# Patient Record
Sex: FEMALE | Race: WHITE | Employment: OTHER | URBAN - NONMETROPOLITAN AREA
[De-identification: names, ages, dates, MRNs, and addresses within clinical notes are randomized per-mention and may not be internally consistent; named-entity substitution may affect disease eponyms.]

---

## 2017-01-18 RX ORDER — ATORVASTATIN CALCIUM 10 MG/1
TABLET, FILM COATED ORAL
Qty: 90 TABLET | Refills: 3 | Status: SHIPPED | OUTPATIENT
Start: 2017-01-18 | End: 2018-01-30 | Stop reason: SDUPTHER

## 2017-04-06 RX ORDER — LEVOTHYROXINE SODIUM 75 MCG
TABLET ORAL
Qty: 90 TABLET | Refills: 3 | Status: SHIPPED | OUTPATIENT
Start: 2017-04-06 | End: 2018-04-11 | Stop reason: SDUPTHER

## 2017-05-08 ENCOUNTER — OFFICE VISIT (OUTPATIENT)
Dept: FAMILY MEDICINE CLINIC | Age: 77
End: 2017-05-08

## 2017-05-08 VITALS
HEIGHT: 64 IN | BODY MASS INDEX: 26.8 KG/M2 | DIASTOLIC BLOOD PRESSURE: 76 MMHG | HEART RATE: 81 BPM | OXYGEN SATURATION: 96 % | WEIGHT: 157 LBS | SYSTOLIC BLOOD PRESSURE: 128 MMHG

## 2017-05-08 DIAGNOSIS — M51.36 LUMBAR DEGENERATIVE DISC DISEASE: ICD-10-CM

## 2017-05-08 DIAGNOSIS — Z23 NEED FOR PROPHYLACTIC VACCINATION AGAINST DIPHTHERIA-TETANUS-PERTUSSIS (DTP): ICD-10-CM

## 2017-05-08 DIAGNOSIS — E05.90 HYPERTHYROIDISM: ICD-10-CM

## 2017-05-08 DIAGNOSIS — E78.2 MIXED HYPERLIPIDEMIA: ICD-10-CM

## 2017-05-08 DIAGNOSIS — E05.00 GRAVES' OPHTHALMOPATHY: ICD-10-CM

## 2017-05-08 DIAGNOSIS — N32.81 OVERACTIVE BLADDER: Primary | ICD-10-CM

## 2017-05-08 DIAGNOSIS — Z23 NEED FOR PROPHYLACTIC VACCINATION AGAINST STREPTOCOCCUS PNEUMONIAE (PNEUMOCOCCUS): ICD-10-CM

## 2017-05-08 DIAGNOSIS — L40.9 PSORIASIS: ICD-10-CM

## 2017-05-08 DIAGNOSIS — L43.9 LICHEN PLANUS: ICD-10-CM

## 2017-05-08 DIAGNOSIS — L82.0 SEBORRHEIC KERATOSIS, INFLAMED: ICD-10-CM

## 2017-05-08 DIAGNOSIS — E55.9 VITAMIN D DEFICIENCY: ICD-10-CM

## 2017-05-08 PROCEDURE — G8420 CALC BMI NORM PARAMETERS: HCPCS | Performed by: FAMILY MEDICINE

## 2017-05-08 PROCEDURE — 90732 PPSV23 VACC 2 YRS+ SUBQ/IM: CPT | Performed by: FAMILY MEDICINE

## 2017-05-08 PROCEDURE — 1123F ACP DISCUSS/DSCN MKR DOCD: CPT | Performed by: FAMILY MEDICINE

## 2017-05-08 PROCEDURE — 4040F PNEUMOC VAC/ADMIN/RCVD: CPT | Performed by: FAMILY MEDICINE

## 2017-05-08 PROCEDURE — 1036F TOBACCO NON-USER: CPT | Performed by: FAMILY MEDICINE

## 2017-05-08 PROCEDURE — 1090F PRES/ABSN URINE INCON ASSESS: CPT | Performed by: FAMILY MEDICINE

## 2017-05-08 PROCEDURE — 99214 OFFICE O/P EST MOD 30 MIN: CPT | Performed by: FAMILY MEDICINE

## 2017-05-08 PROCEDURE — G8399 PT W/DXA RESULTS DOCUMENT: HCPCS | Performed by: FAMILY MEDICINE

## 2017-05-08 PROCEDURE — G8427 DOCREV CUR MEDS BY ELIG CLIN: HCPCS | Performed by: FAMILY MEDICINE

## 2017-05-08 PROCEDURE — G0009 ADMIN PNEUMOCOCCAL VACCINE: HCPCS | Performed by: FAMILY MEDICINE

## 2017-05-08 RX ORDER — CLOBETASOL PROPIONATE 0.5 MG/G
CREAM TOPICAL
Qty: 60 G | Refills: 0 | Status: SHIPPED | OUTPATIENT
Start: 2017-05-08 | End: 2018-07-10 | Stop reason: ALTCHOICE

## 2017-05-12 ENCOUNTER — NURSE ONLY (OUTPATIENT)
Dept: FAMILY MEDICINE CLINIC | Age: 77
End: 2017-05-12

## 2017-05-12 DIAGNOSIS — E55.9 VITAMIN D DEFICIENCY: ICD-10-CM

## 2017-05-12 DIAGNOSIS — E05.90 HYPERTHYROIDISM: ICD-10-CM

## 2017-05-12 DIAGNOSIS — E78.2 MIXED HYPERLIPIDEMIA: ICD-10-CM

## 2017-05-12 LAB
A/G RATIO: 2 (ref 1.1–2.2)
ALBUMIN SERPL-MCNC: 4.1 G/DL (ref 3.4–5)
ALP BLD-CCNC: 111 U/L (ref 40–129)
ALT SERPL-CCNC: 10 U/L (ref 10–40)
ANION GAP SERPL CALCULATED.3IONS-SCNC: 14 MMOL/L (ref 3–16)
AST SERPL-CCNC: 15 U/L (ref 15–37)
BILIRUB SERPL-MCNC: 0.6 MG/DL (ref 0–1)
BUN BLDV-MCNC: 18 MG/DL (ref 7–20)
CALCIUM SERPL-MCNC: 8.9 MG/DL (ref 8.3–10.6)
CHLORIDE BLD-SCNC: 106 MMOL/L (ref 99–110)
CHOLESTEROL, TOTAL: 150 MG/DL (ref 0–199)
CO2: 25 MMOL/L (ref 21–32)
CREAT SERPL-MCNC: 0.8 MG/DL (ref 0.6–1.2)
GFR AFRICAN AMERICAN: >60
GFR NON-AFRICAN AMERICAN: >60
GLOBULIN: 2.1 G/DL
GLUCOSE BLD-MCNC: 78 MG/DL (ref 70–99)
HDLC SERPL-MCNC: 73 MG/DL (ref 40–60)
LDL CHOLESTEROL CALCULATED: 57 MG/DL
POTASSIUM SERPL-SCNC: 4.4 MMOL/L (ref 3.5–5.1)
SODIUM BLD-SCNC: 145 MMOL/L (ref 136–145)
TOTAL PROTEIN: 6.2 G/DL (ref 6.4–8.2)
TRIGL SERPL-MCNC: 100 MG/DL (ref 0–150)
VLDLC SERPL CALC-MCNC: 20 MG/DL

## 2017-05-12 PROCEDURE — 36415 COLL VENOUS BLD VENIPUNCTURE: CPT | Performed by: FAMILY MEDICINE

## 2017-05-13 LAB
T4 FREE: 1.7 NG/DL (ref 0.9–1.8)
TSH SERPL DL<=0.05 MIU/L-ACNC: 0.46 UIU/ML (ref 0.27–4.2)
VITAMIN D 25-HYDROXY: 54 NG/ML

## 2017-06-14 ENCOUNTER — OFFICE VISIT (OUTPATIENT)
Dept: ORTHOPEDIC SURGERY | Age: 77
End: 2017-06-14

## 2017-06-14 VITALS
SYSTOLIC BLOOD PRESSURE: 144 MMHG | HEIGHT: 64 IN | DIASTOLIC BLOOD PRESSURE: 72 MMHG | BODY MASS INDEX: 24.43 KG/M2 | HEART RATE: 69 BPM | WEIGHT: 143.08 LBS

## 2017-06-14 DIAGNOSIS — S97.102A CRUSH INJURY, TOE, LEFT, INITIAL ENCOUNTER: Primary | ICD-10-CM

## 2017-06-14 DIAGNOSIS — S92.535A CLOSED NONDISPLACED FRACTURE OF DISTAL PHALANX OF LESSER TOE OF LEFT FOOT, INITIAL ENCOUNTER: ICD-10-CM

## 2017-06-14 PROBLEM — S97.109A: Status: ACTIVE | Noted: 2017-06-14

## 2017-06-14 PROCEDURE — G8420 CALC BMI NORM PARAMETERS: HCPCS | Performed by: ORTHOPAEDIC SURGERY

## 2017-06-14 PROCEDURE — G8427 DOCREV CUR MEDS BY ELIG CLIN: HCPCS | Performed by: ORTHOPAEDIC SURGERY

## 2017-06-14 PROCEDURE — 1090F PRES/ABSN URINE INCON ASSESS: CPT | Performed by: ORTHOPAEDIC SURGERY

## 2017-06-14 PROCEDURE — 99202 OFFICE O/P NEW SF 15 MIN: CPT | Performed by: ORTHOPAEDIC SURGERY

## 2017-06-14 PROCEDURE — 1123F ACP DISCUSS/DSCN MKR DOCD: CPT | Performed by: ORTHOPAEDIC SURGERY

## 2017-06-14 PROCEDURE — G8399 PT W/DXA RESULTS DOCUMENT: HCPCS | Performed by: ORTHOPAEDIC SURGERY

## 2017-06-14 PROCEDURE — 1036F TOBACCO NON-USER: CPT | Performed by: ORTHOPAEDIC SURGERY

## 2017-06-14 PROCEDURE — 4040F PNEUMOC VAC/ADMIN/RCVD: CPT | Performed by: ORTHOPAEDIC SURGERY

## 2017-07-10 ENCOUNTER — OFFICE VISIT (OUTPATIENT)
Dept: ORTHOPEDIC SURGERY | Age: 77
End: 2017-07-10

## 2017-07-10 VITALS
SYSTOLIC BLOOD PRESSURE: 141 MMHG | DIASTOLIC BLOOD PRESSURE: 65 MMHG | HEART RATE: 57 BPM | HEIGHT: 64 IN | BODY MASS INDEX: 24.43 KG/M2 | WEIGHT: 143.08 LBS

## 2017-07-10 DIAGNOSIS — S97.102A CRUSH INJURY, TOE, LEFT, INITIAL ENCOUNTER: ICD-10-CM

## 2017-07-10 DIAGNOSIS — M79.672 LEFT FOOT PAIN: Primary | ICD-10-CM

## 2017-07-10 DIAGNOSIS — S92.535A CLOSED NONDISPLACED FRACTURE OF DISTAL PHALANX OF LESSER TOE OF LEFT FOOT, INITIAL ENCOUNTER: ICD-10-CM

## 2017-07-10 PROCEDURE — 4040F PNEUMOC VAC/ADMIN/RCVD: CPT | Performed by: ORTHOPAEDIC SURGERY

## 2017-07-10 PROCEDURE — G8399 PT W/DXA RESULTS DOCUMENT: HCPCS | Performed by: ORTHOPAEDIC SURGERY

## 2017-07-10 PROCEDURE — G8427 DOCREV CUR MEDS BY ELIG CLIN: HCPCS | Performed by: ORTHOPAEDIC SURGERY

## 2017-07-10 PROCEDURE — 73630 X-RAY EXAM OF FOOT: CPT | Performed by: ORTHOPAEDIC SURGERY

## 2017-07-10 PROCEDURE — 99213 OFFICE O/P EST LOW 20 MIN: CPT | Performed by: ORTHOPAEDIC SURGERY

## 2017-07-10 PROCEDURE — 1036F TOBACCO NON-USER: CPT | Performed by: ORTHOPAEDIC SURGERY

## 2017-07-10 PROCEDURE — G8420 CALC BMI NORM PARAMETERS: HCPCS | Performed by: ORTHOPAEDIC SURGERY

## 2017-07-10 PROCEDURE — 1090F PRES/ABSN URINE INCON ASSESS: CPT | Performed by: ORTHOPAEDIC SURGERY

## 2017-07-10 PROCEDURE — 1123F ACP DISCUSS/DSCN MKR DOCD: CPT | Performed by: ORTHOPAEDIC SURGERY

## 2017-11-08 ENCOUNTER — OFFICE VISIT (OUTPATIENT)
Dept: FAMILY MEDICINE CLINIC | Age: 77
End: 2017-11-08

## 2017-11-08 VITALS
BODY MASS INDEX: 26.32 KG/M2 | HEIGHT: 64 IN | DIASTOLIC BLOOD PRESSURE: 74 MMHG | HEART RATE: 65 BPM | OXYGEN SATURATION: 98 % | WEIGHT: 154.2 LBS | SYSTOLIC BLOOD PRESSURE: 122 MMHG

## 2017-11-08 DIAGNOSIS — E89.0 POSTABLATIVE HYPOTHYROIDISM: Primary | ICD-10-CM

## 2017-11-08 DIAGNOSIS — E05.90 HYPERTHYROIDISM: ICD-10-CM

## 2017-11-08 DIAGNOSIS — R53.83 FATIGUE, UNSPECIFIED TYPE: ICD-10-CM

## 2017-11-08 DIAGNOSIS — L43.9 LICHEN PLANUS: ICD-10-CM

## 2017-11-08 DIAGNOSIS — N32.81 OVERACTIVE BLADDER: ICD-10-CM

## 2017-11-08 DIAGNOSIS — E78.2 MIXED HYPERLIPIDEMIA: ICD-10-CM

## 2017-11-08 DIAGNOSIS — M51.36 LUMBAR DEGENERATIVE DISC DISEASE: ICD-10-CM

## 2017-11-08 DIAGNOSIS — L40.9 PSORIASIS: ICD-10-CM

## 2017-11-08 DIAGNOSIS — L21.9 SEBORRHEA: ICD-10-CM

## 2017-11-08 LAB
BASOPHILS ABSOLUTE: 0.1 K/UL (ref 0–0.2)
BASOPHILS RELATIVE PERCENT: 1 %
EOSINOPHILS ABSOLUTE: 0.1 K/UL (ref 0–0.6)
EOSINOPHILS RELATIVE PERCENT: 1.1 %
HCT VFR BLD CALC: 45.6 % (ref 36–48)
HEMOGLOBIN: 15 G/DL (ref 12–16)
LYMPHOCYTES ABSOLUTE: 1.1 K/UL (ref 1–5.1)
LYMPHOCYTES RELATIVE PERCENT: 17.7 %
MCH RBC QN AUTO: 30.2 PG (ref 26–34)
MCHC RBC AUTO-ENTMCNC: 32.8 G/DL (ref 31–36)
MCV RBC AUTO: 92.2 FL (ref 80–100)
MONOCYTES ABSOLUTE: 0.5 K/UL (ref 0–1.3)
MONOCYTES RELATIVE PERCENT: 8.6 %
NEUTROPHILS ABSOLUTE: 4.3 K/UL (ref 1.7–7.7)
NEUTROPHILS RELATIVE PERCENT: 71.6 %
PDW BLD-RTO: 14.1 % (ref 12.4–15.4)
PLATELET # BLD: 235 K/UL (ref 135–450)
PMV BLD AUTO: 10 FL (ref 5–10.5)
RBC # BLD: 4.95 M/UL (ref 4–5.2)
T3 FREE: 2.7 PG/ML (ref 2.3–4.2)
T4 FREE: 1.8 NG/DL (ref 0.9–1.8)
TSH SERPL DL<=0.05 MIU/L-ACNC: 1.74 UIU/ML (ref 0.27–4.2)
WBC # BLD: 6 K/UL (ref 4–11)

## 2017-11-08 PROCEDURE — 4040F PNEUMOC VAC/ADMIN/RCVD: CPT | Performed by: FAMILY MEDICINE

## 2017-11-08 PROCEDURE — G8427 DOCREV CUR MEDS BY ELIG CLIN: HCPCS | Performed by: FAMILY MEDICINE

## 2017-11-08 PROCEDURE — 1090F PRES/ABSN URINE INCON ASSESS: CPT | Performed by: FAMILY MEDICINE

## 2017-11-08 PROCEDURE — 36415 COLL VENOUS BLD VENIPUNCTURE: CPT | Performed by: FAMILY MEDICINE

## 2017-11-08 PROCEDURE — 1036F TOBACCO NON-USER: CPT | Performed by: FAMILY MEDICINE

## 2017-11-08 PROCEDURE — G8417 CALC BMI ABV UP PARAM F/U: HCPCS | Performed by: FAMILY MEDICINE

## 2017-11-08 PROCEDURE — 1123F ACP DISCUSS/DSCN MKR DOCD: CPT | Performed by: FAMILY MEDICINE

## 2017-11-08 PROCEDURE — 99214 OFFICE O/P EST MOD 30 MIN: CPT | Performed by: FAMILY MEDICINE

## 2017-11-08 PROCEDURE — G8482 FLU IMMUNIZE ORDER/ADMIN: HCPCS | Performed by: FAMILY MEDICINE

## 2017-11-08 PROCEDURE — G8399 PT W/DXA RESULTS DOCUMENT: HCPCS | Performed by: FAMILY MEDICINE

## 2017-11-08 RX ORDER — KETOCONAZOLE 20 MG/ML
SHAMPOO TOPICAL
Qty: 120 ML | Refills: 5 | Status: SHIPPED | OUTPATIENT
Start: 2017-11-08 | End: 2019-02-27 | Stop reason: SDUPTHER

## 2017-11-08 RX ORDER — FLUOCINONIDE TOPICAL SOLUTION USP, 0.05% 0.5 MG/ML
SOLUTION TOPICAL
Qty: 60 ML | Refills: 2 | Status: SHIPPED | OUTPATIENT
Start: 2017-11-08 | End: 2018-04-11 | Stop reason: SDUPTHER

## 2017-11-08 RX ORDER — INFLUENZA A VIRUS A/MICHIGAN/45/2015 X-275 (H1N1) ANTIGEN (FORMALDEHYDE INACTIVATED), INFLUENZA A VIRUS A/SINGAPORE/INFIMH-16-0019/2016 IVR-186 (H3N2) ANTIGEN (FORMALDEHYDE INACTIVATED), AND INFLUENZA B VIRUS B/MARYLAND/15/2016 BX-69A (A B/COLORADO/6/2017-LIKE VIRUS) ANTIGEN (FORMALDEHYDE INACTIVATED) 60; 60; 60 UG/.5ML; UG/.5ML; UG/.5ML
INJECTION, SUSPENSION INTRAMUSCULAR
Refills: 0 | COMMUNITY
Start: 2017-10-11 | End: 2017-11-08 | Stop reason: ALTCHOICE

## 2017-11-08 ASSESSMENT — PATIENT HEALTH QUESTIONNAIRE - PHQ9
1. LITTLE INTEREST OR PLEASURE IN DOING THINGS: 0
2. FEELING DOWN, DEPRESSED OR HOPELESS: 0
SUM OF ALL RESPONSES TO PHQ9 QUESTIONS 1 & 2: 0
SUM OF ALL RESPONSES TO PHQ QUESTIONS 1-9: 0

## 2017-11-08 NOTE — PATIENT INSTRUCTIONS
Try taking tylenol in the AM and an Advil at night for hip pain. Patient should call the office immediately with new or ongoing signs or symptoms or worsening, or proceed to the emergency room. If you are on medications which could impair your senses, you are at risk of weakness, falls, dizziness, or drowsiness. You should be careful during activities which could place you at risk of harm, such as climbing, using stairs, operating machinery, or driving vehicles. If you feel you cannot safely do these activities, you should request others to help you, or avoid the activities altogether. If you are drowsy for any other reason, you should use the same precautions as listed above.

## 2017-11-08 NOTE — PROGRESS NOTES
SUBJECTIVE:    2017    Mili Palafox (: 1940)    68 y.o.    female    Prior to Visit Medications    Medication Sig Taking? Authorizing Provider   clobetasol prop emollient base (CLOBETASOL PROPIONATE E) 0.05 % CREA Apply daily Yes Victor M Coello MD   SYNTHROID 75 MCG tablet TAKE 1 TABLET DAILY Yes Mary Ayon NP   atorvastatin (LIPITOR) 10 MG tablet TAKE 1 TABLET DAILY Yes Victor M Coello MD   Cholecalciferol (VITAMIN D3) 5000 UNITS TABS Take 1 each by mouth Yes Historical Provider, MD   Misc Natural Products (OSTEO BI-FLEX JOINT SHIELD PO) Take by mouth + VITAMIN D Yes Historical Provider, MD   ibandronate (BONIVA) 150 MG tablet Take 150 mg by mouth every 30 days Take 1 tablet every 30 days. Yes Historical Provider, MD   tolterodine (DETROL LA) 4 MG ER capsule Take 4 mg by mouth daily. Yes Historical Provider, MD   timolol (BETIMOL) 0.5 % ophthalmic solution 1 drop 2 times daily. Yes Historical Provider, MD       ALLERGIES:  Allergies   Allergen Reactions    Bacitracin        Chief Complaint   Patient presents with    Hyperlipidemia     Watching lowfat diet.  Hypothyroidism     SHe is very tired all the time. She also gets very hot and then gets  She would like Thyroid levels checked again.  Other     Vitamin D Deficiency.  Hair/Scalp Problem     Dermatologist took a look at spot in head and recommended Flucononide 0.0% solution and Ketoconazole 2% shampoo or Selenium Sulfide 2.5 % Shampoo. She was told to ask PCP to order these for her. She states her hips hurt during the night, she has been taking Advil 1 at night, advised she could take tylenol at bedtime and Advil in AM, or the opposite and see how she gets along. She states she is always fatigued. HPI  Mili Palafox dermatologist took a glance at her head and recommended fluocinonide solution and Ketaconozole 2% shampoo or selenium sulfide 2 and half percent shampoo for seborrheic dermatitis. She did this while she was at 's dermatology appointment. She asked that we write the prescriptions go she was not formally seen. We have actually written and diagnosed similar things in the past.  Complains of feeling tired gets hot then gets cold. She's had no falls. She is worried about her  and his health, does not sleep well. Seems that she wakes up several times throughout the night. Is here for cholesterol. Tolerating medication. Trying to watch low-fat diet. Weight stable. No change in exercise. Here for hypothyroidism. No tremor. No palpitations. No hair loss. No change in skin texture. HX: (> 3 status chronic/inact. Prob) or  Wt Readings from Last 3 Encounters:   11/08/17 154 lb 3.2 oz (69.9 kg)   07/10/17 143 lb 1.3 oz (64.9 kg)   06/14/17 143 lb 1.3 oz (64.9 kg)       Occupation Retired from healthcare administration              HPI:  (>4 )  LOCATION:  QUALITY:SEVERITY:  DURATION:  TIMING:  CONTEXT:  MOD. FACTORS:  ASSOC. S/S:    Pertinent items are noted in HPI.  (+/- 2-9 systems)  ROS  All other systems reviewed and are negative except as noted above  Additional review of systems may be scanned into the media section of this medical record. Any responses requiring further intervention were pursued.     Past Medical History:   Diagnosis Date    Closed nondisplaced fracture of distal phalanx of lesser toe of left foot 6/14/2017    DDD (degenerative disc disease)     Grave's disease     Hyperlipidemia     Lichen planus     Overactive bladder     Psoriasis     Radiculopathy        Family History   Problem Relation Age of Onset    Heart Disease Mother     Heart Disease Father        Social History   Substance Use Topics    Smoking status: Former Smoker     Quit date: 7/8/1998    Smokeless tobacco: Never Used    Alcohol use Yes         Past Surgical History:   Procedure Laterality Date    CYST REMOVAL      from right forearm 9/2016    EYE SURGERY      HYSTERECTOMY         Immunization History   Administered Date(s) Administered    Influenza Vaccine, unspecified formulation 10/13/2016    Influenza Virus Vaccine 11/03/2015, 10/11/2017    Pneumococcal 13-valent Conjugate ([id number]) 04/14/2015    Pneumococcal Polysaccharide ([id number]) 05/08/2017    Tdap (Boostrix, Adacel) 05/09/2017       Vitals:    11/08/17 1018   BP: 122/74   Site: Left Arm   Position: Sitting   Cuff Size: Medium Adult   Pulse: 65   SpO2: 98%   Weight: 154 lb 3.2 oz (69.9 kg)   Height: 5' 3.5\" (1.613 m)       Estimated body mass index is 26.89 kg/m² as calculated from the following:    Height as of this encounter: 5' 3.5\" (1.613 m). Weight as of this encounter: 154 lb 3.2 oz (69.9 kg).   OBJECTIVE:    /74 (Site: Left Arm, Position: Sitting, Cuff Size: Medium Adult)   Pulse 65   Ht 5' 3.5\" (1.613 m)   Wt 154 lb 3.2 oz (69.9 kg)   LMP  (LMP Unknown)   SpO2 98%   BMI 26.89 kg/m²   BP Readings from Last 2 Encounters:   11/08/17 122/74   07/10/17 (!) 141/65     Lab Review   Nurse Only on 05/12/2017   Component Date Value    Sodium 05/12/2017 145     Potassium 05/12/2017 4.4     Chloride 05/12/2017 106     CO2 05/12/2017 25     Anion Gap 05/12/2017 14     Glucose 05/12/2017 78     BUN 05/12/2017 18     CREATININE 05/12/2017 0.8     GFR Non- 05/12/2017 >60     GFR  05/12/2017 >60     Calcium 05/12/2017 8.9     Total Protein 05/12/2017 6.2*    Alb 05/12/2017 4.1     Albumin/Globulin Ratio 05/12/2017 2.0     Total Bilirubin 05/12/2017 0.6     Alkaline Phosphatase 05/12/2017 111     ALT 05/12/2017 10     AST 05/12/2017 15     Globulin 05/12/2017 2.1     Cholesterol, Total 05/12/2017 150     Triglycerides 05/12/2017 100     HDL 05/12/2017 73*    LDL Calculated 05/12/2017 57     VLDL CHOLESTEROL CALCULA* 05/12/2017 20     TSH 05/13/2017 0.46     T4 Free 05/13/2017 1.7     Vit D, 25-Hydroxy 05/13/2017 54.0        Physical Exam Constitutional: She is oriented to person, place, and time. She appears well-developed and well-nourished. No distress. HENT:   Head: Normocephalic and atraumatic. Right Ear: External ear normal.   Left Ear: External ear normal.   Nose: Nose normal.   Eyes: Conjunctivae are normal. Pupils are equal, round, and reactive to light. Right eye exhibits no discharge. Left eye exhibits no discharge. No scleral icterus. Left eye exhibits abnormal extraocular motion (Ptosis left eye chronic). Neck: Normal range of motion. Neck supple. No JVD present. No tracheal deviation present. No thyromegaly present. Cardiovascular: Normal rate, regular rhythm, normal heart sounds and intact distal pulses. Exam reveals no gallop and no friction rub. No murmur heard. Pulmonary/Chest: Effort normal and breath sounds normal. No stridor. No respiratory distress. She has no wheezes. She has no rales. She exhibits no tenderness. Abdominal: Soft. Bowel sounds are normal. She exhibits no distension and no mass. There is no tenderness. There is no guarding. Musculoskeletal: She exhibits no edema. Right elbow: She exhibits normal range of motion. Left elbow: She exhibits normal range of motion. Right knee: She exhibits normal range of motion. Left knee: She exhibits normal range of motion. Right ankle: She exhibits normal range of motion. Left ankle: She exhibits normal range of motion. Lumbar back: She exhibits decreased range of motion and tenderness. Lymphadenopathy:        Head (right side): No submental, no submandibular, no tonsillar, no preauricular, no posterior auricular and no occipital adenopathy present. Head (left side): No submental, no submandibular, no tonsillar, no preauricular, no posterior auricular and no occipital adenopathy present. She has no cervical adenopathy. Right: No supraclavicular adenopathy present.         Left: No immediately with new or ongoing signs or symptoms or worsening, or proceed to the emergency room. All entries in chief complaint and history of present illness are reviewed and validated by me. No changes in past medical history, past surgical history, social history, or family history were noted during the patient encounter unless specifically listed above. All updates of past medical history, past surgical history, social history, or family history were reviewed personally by me during the office visit. All problems listed in the assessment are stable unless noted otherwise. Medication profile reviewed personally by me during the office visit. Medication side effects and possible impairments from medications were discussed as applicable. Every effort has been made to assure accurate transcription by this voice recognition software. However, mistakes in transcription may still occur      IZoraida am scribing for and in the presence of Dr Bobbi Mckeon. 11/8/2017      10:36 AM      I, Dr. Ace Szymanski, personally performed the services described in this documentation, as scribed by Zoraida Lozoya RN, in my presence, and it is both accurate and complete. I agree with the Chief Complaint, ROS, and Past Histories independently gathered by the clinical support staff and the remaining scribed note accurately describes my personal service to the patient.     11/8/2017    10:36 AM

## 2017-11-09 LAB
FOLATE: 12.68 NG/ML (ref 4.78–24.2)
VITAMIN B-12: 311 PG/ML (ref 211–911)

## 2018-01-30 ENCOUNTER — OFFICE VISIT (OUTPATIENT)
Dept: FAMILY MEDICINE CLINIC | Age: 78
End: 2018-01-30

## 2018-01-30 VITALS
DIASTOLIC BLOOD PRESSURE: 66 MMHG | OXYGEN SATURATION: 96 % | BODY MASS INDEX: 26.8 KG/M2 | HEART RATE: 60 BPM | WEIGHT: 157 LBS | SYSTOLIC BLOOD PRESSURE: 124 MMHG | HEIGHT: 64 IN

## 2018-01-30 DIAGNOSIS — E78.2 MIXED HYPERLIPIDEMIA: ICD-10-CM

## 2018-01-30 DIAGNOSIS — L82.0 SEBORRHEIC KERATOSES, INFLAMED: Primary | ICD-10-CM

## 2018-01-30 PROCEDURE — 1036F TOBACCO NON-USER: CPT | Performed by: FAMILY MEDICINE

## 2018-01-30 PROCEDURE — 1090F PRES/ABSN URINE INCON ASSESS: CPT | Performed by: FAMILY MEDICINE

## 2018-01-30 PROCEDURE — G8427 DOCREV CUR MEDS BY ELIG CLIN: HCPCS | Performed by: FAMILY MEDICINE

## 2018-01-30 PROCEDURE — 1123F ACP DISCUSS/DSCN MKR DOCD: CPT | Performed by: FAMILY MEDICINE

## 2018-01-30 PROCEDURE — G8417 CALC BMI ABV UP PARAM F/U: HCPCS | Performed by: FAMILY MEDICINE

## 2018-01-30 PROCEDURE — 99212 OFFICE O/P EST SF 10 MIN: CPT | Performed by: FAMILY MEDICINE

## 2018-01-30 PROCEDURE — 4040F PNEUMOC VAC/ADMIN/RCVD: CPT | Performed by: FAMILY MEDICINE

## 2018-01-30 PROCEDURE — G8482 FLU IMMUNIZE ORDER/ADMIN: HCPCS | Performed by: FAMILY MEDICINE

## 2018-01-30 PROCEDURE — G8399 PT W/DXA RESULTS DOCUMENT: HCPCS | Performed by: FAMILY MEDICINE

## 2018-01-30 RX ORDER — ATORVASTATIN CALCIUM 10 MG/1
TABLET, FILM COATED ORAL
Qty: 90 TABLET | Refills: 3 | Status: SHIPPED | OUTPATIENT
Start: 2018-01-30 | End: 2019-01-03 | Stop reason: SDUPTHER

## 2018-01-30 NOTE — PATIENT INSTRUCTIONS
Return on 2/9/18 at 4:00 for lesion removal.     Patient should call the office immediately with new or ongoing signs or symptoms or worsening, or proceed to the emergency room. If you are on medications which could impair your senses, you are at risk of weakness, falls, dizziness, or drowsiness. You should be careful during activities which could place you at risk of harm, such as climbing, using stairs, operating machinery, or driving vehicles. If you feel you cannot safely do these activities, you should request others to help you, or avoid the activities altogether. If you are drowsy for any other reason, you should use the same precautions as listed above.

## 2018-01-30 NOTE — PROGRESS NOTES
Chief Complaint   Patient presents with    Lesion(s)   Lesion on her back has been changing in size and has red ring around it. HPI:   Red Louise is a 68 y.o. (: 1940) here today for Lesion. Skin Lesion  Patient complains of a skin lesion of the back. The lesion has been present for 1 year. Lesion has changed in 1 year. Symptoms associated with the lesion are: increasing diameter, increasing thickness. Patient denies itching, bleeding, tendency to be traumatized, pain, drainage. Patient's medications, allergies, past medical, surgical, social and family histories were reviewed and updated as appropriate on 2018 at 9:05 AM.    LDL Calculated (mg/dL)   Date Value   2017 57     Review of Systems  Additional review of systems may be scanned into the media section of this medical record. Any responses requiring further intervention were pursued. OBJECTIVE:    Ht 5' 3.5\" (1.613 m)   LMP  (LMP Unknown)   Physical Exam   Constitutional: She is oriented to person, place, and time. She appears well-developed and well-nourished. No distress. HENT:   Head: Normocephalic and atraumatic. Cardiovascular: Normal rate. Pulmonary/Chest: Effort normal. No respiratory distress. Neurological: She is alert and oriented to person, place, and time. Skin: She is not diaphoretic. Psychiatric: She has a normal mood and affect. Her behavior is normal. Judgment and thought content normal.   Nursing note and vitals reviewed. ASSESSMENT/PLAN:    Noemy Kaba was seen today for lesion(s). Diagnoses and all orders for this visit:    Seborrheic keratoses, inflamed    Mixed hyperlipidemia  -     atorvastatin (LIPITOR) 10 MG tablet; TAKE 1 TABLET DAILY     because the lesion has grown and there is a red surrounding ring, she will return for shave excision and sent for pathology.     Patient should call the office immediately with new or ongoing signs or symptoms or worsening, or proceed

## 2018-01-30 NOTE — LETTER
2018     [id number]      Dear Joni Bolden:    Thank you for enrolling in [address] Please follow the instructions below to securely access your online medical record. [employer] allows you to send messages to your doctor, view your test results, renew your prescriptions, schedule appointments, and more. How Do I Sign Up? 1. In your Internet browser, go to [email or web address]  2. Click on the Sign Up Now link in the Sign In box. You will see the New Member Sign Up page. 3. Enter your [employer] Access Code exactly as it appears below. You will not need to use this code after youve completed the sign-up process. If you do not sign up before the expiration date, you must request a new code. [employer] Access Code: Activation Code has   Activation Code Expiration: 2018 10:53 AM  Enter your Social Security Number ([id number]) and Date of Birth (mm/dd/yyyy) as indicated and click Submit. You will be taken to the next sign-up page. 4. Create a [employer] ID. This will be your [employer] login ID and cannot be changed, so think of one that is secure and easy to remember. 5. Create a [employer] password. You can change your password at any time. 6. Enter your Password Reset Question and Answer. This can be used at a later time if you forget your password. 7. Enter your e-mail address. You will receive e-mail notification when new information is available in [address] 8. Click Sign Up. You can now view your medical record. Additional Information  If you have questions, please contact the physician practice where you receive care. Remember, [employer] is NOT to be used for urgent needs. For medical emergencies, dial 911. For questions regarding your [employer] account call [phone]. If you have a clinical question, please call your doctor's office.

## 2018-02-09 ENCOUNTER — OFFICE VISIT (OUTPATIENT)
Dept: FAMILY MEDICINE CLINIC | Age: 78
End: 2018-02-09

## 2018-02-09 VITALS
WEIGHT: 156.6 LBS | RESPIRATION RATE: 16 BRPM | TEMPERATURE: 97.2 F | DIASTOLIC BLOOD PRESSURE: 66 MMHG | SYSTOLIC BLOOD PRESSURE: 134 MMHG | OXYGEN SATURATION: 99 % | BODY MASS INDEX: 26.73 KG/M2 | HEART RATE: 71 BPM | HEIGHT: 64 IN

## 2018-02-09 DIAGNOSIS — D49.2 ABNORMAL SKIN GROWTH: ICD-10-CM

## 2018-02-09 DIAGNOSIS — L82.0 SEBORRHEIC KERATOSES, INFLAMED: Primary | ICD-10-CM

## 2018-02-09 DIAGNOSIS — R20.9 SKIN SENSATION DISTURBANCE: ICD-10-CM

## 2018-02-09 PROCEDURE — G8482 FLU IMMUNIZE ORDER/ADMIN: HCPCS | Performed by: FAMILY MEDICINE

## 2018-02-09 PROCEDURE — 11300 SHAVE SKIN LESION 0.5 CM/<: CPT | Performed by: FAMILY MEDICINE

## 2018-02-09 PROCEDURE — G8399 PT W/DXA RESULTS DOCUMENT: HCPCS | Performed by: FAMILY MEDICINE

## 2018-02-09 PROCEDURE — G8417 CALC BMI ABV UP PARAM F/U: HCPCS | Performed by: FAMILY MEDICINE

## 2018-02-09 PROCEDURE — G8428 CUR MEDS NOT DOCUMENT: HCPCS | Performed by: FAMILY MEDICINE

## 2018-02-09 PROCEDURE — 4040F PNEUMOC VAC/ADMIN/RCVD: CPT | Performed by: FAMILY MEDICINE

## 2018-02-09 PROCEDURE — 1036F TOBACCO NON-USER: CPT | Performed by: FAMILY MEDICINE

## 2018-02-09 PROCEDURE — 99212 OFFICE O/P EST SF 10 MIN: CPT | Performed by: FAMILY MEDICINE

## 2018-02-09 PROCEDURE — 1090F PRES/ABSN URINE INCON ASSESS: CPT | Performed by: FAMILY MEDICINE

## 2018-02-09 PROCEDURE — 11301 SHAVE SKIN LESION 0.6-1.0 CM: CPT | Performed by: FAMILY MEDICINE

## 2018-02-09 PROCEDURE — 1123F ACP DISCUSS/DSCN MKR DOCD: CPT | Performed by: FAMILY MEDICINE

## 2018-02-09 NOTE — PROGRESS NOTES
Chief Complaint   Patient presents with    Other     lesion removal back and chest       HPI:   Andreas Macario is a 68 y.o. (: 1940) here today for Removal previously identified lesion left back. At the same time she's had newly inflamed lesion on the front of the left shoulder. Both of these areas are sensitive. The one on her left shoulder has grown the top was partly torn off. The back lesion was previously identified in a previous progress note but the inflamed lesion on the frontal left shoulder is newly discovered today    Patient's medications, allergies, past medical, surgical, social and family histories were reviewed and updated as appropriate on 2018 at 4:45 PM.    LDL Calculated (mg/dL)   Date Value   2017 57     Review of Systems  Additional review of systems may be scanned into the media section of this medical record. Any responses requiring further intervention were pursued. OBJECTIVE:    /66 (Site: Left Arm, Position: Sitting, Cuff Size: Large Adult)   Pulse 71   Temp 97.2 °F (36.2 °C) (Temporal)   Resp 16   Ht 5' 3.5\" (1.613 m)   Wt 156 lb 9.6 oz (71 kg)   LMP  (LMP Unknown)   SpO2 99%   BMI 27.30 kg/m²   Physical Exam   Constitutional: She is oriented to person, place, and time. She appears well-developed and well-nourished. No distress. HENT:   Head: Normocephalic and atraumatic. Cardiovascular: Normal rate. Pulmonary/Chest: Effort normal. No respiratory distress. Neurological: She is alert and oriented to person, place, and time. Skin: She is not diaphoretic.   8 mm papule of the left shoulder. The hyperpigmented top has partly to mostly torn off. There is a 3-4 mm papule of the left shoulder that's erythematous with some slight drainage   Psychiatric: She has a normal mood and affect. Her behavior is normal. Judgment and thought content normal.   Nursing note and vitals reviewed. PROCEDURE NOTE    Written consent obtained.   Patient and

## 2018-04-11 DIAGNOSIS — L21.9 SEBORRHEA: ICD-10-CM

## 2018-04-11 RX ORDER — FLUOCINONIDE TOPICAL SOLUTION USP, 0.05% 0.5 MG/ML
SOLUTION TOPICAL
Qty: 60 ML | Refills: 2 | Status: SHIPPED | OUTPATIENT
Start: 2018-04-11 | End: 2018-09-04 | Stop reason: ALTCHOICE

## 2018-04-11 RX ORDER — LEVOTHYROXINE SODIUM 75 MCG
TABLET ORAL
Qty: 90 TABLET | Refills: 3 | Status: SHIPPED | OUTPATIENT
Start: 2018-04-11 | End: 2018-04-13 | Stop reason: SDUPTHER

## 2018-04-13 DIAGNOSIS — L21.9 SEBORRHEA: ICD-10-CM

## 2018-04-13 RX ORDER — LEVOTHYROXINE SODIUM 75 MCG
TABLET ORAL
Qty: 90 TABLET | Refills: 3 | Status: SHIPPED | OUTPATIENT
Start: 2018-04-13 | End: 2019-03-30 | Stop reason: SDUPTHER

## 2018-07-10 ENCOUNTER — OFFICE VISIT (OUTPATIENT)
Dept: FAMILY MEDICINE CLINIC | Age: 78
End: 2018-07-10

## 2018-07-10 ENCOUNTER — TELEPHONE (OUTPATIENT)
Dept: FAMILY MEDICINE CLINIC | Age: 78
End: 2018-07-10

## 2018-07-10 VITALS
OXYGEN SATURATION: 96 % | BODY MASS INDEX: 26.19 KG/M2 | DIASTOLIC BLOOD PRESSURE: 68 MMHG | HEART RATE: 67 BPM | WEIGHT: 150.2 LBS | SYSTOLIC BLOOD PRESSURE: 136 MMHG

## 2018-07-10 DIAGNOSIS — L30.4 INTERTRIGO: Primary | ICD-10-CM

## 2018-07-10 PROCEDURE — 1090F PRES/ABSN URINE INCON ASSESS: CPT | Performed by: FAMILY MEDICINE

## 2018-07-10 PROCEDURE — 99212 OFFICE O/P EST SF 10 MIN: CPT | Performed by: FAMILY MEDICINE

## 2018-07-10 PROCEDURE — 1123F ACP DISCUSS/DSCN MKR DOCD: CPT | Performed by: FAMILY MEDICINE

## 2018-07-10 PROCEDURE — G8427 DOCREV CUR MEDS BY ELIG CLIN: HCPCS | Performed by: FAMILY MEDICINE

## 2018-07-10 PROCEDURE — G8399 PT W/DXA RESULTS DOCUMENT: HCPCS | Performed by: FAMILY MEDICINE

## 2018-07-10 PROCEDURE — 1036F TOBACCO NON-USER: CPT | Performed by: FAMILY MEDICINE

## 2018-07-10 PROCEDURE — G8417 CALC BMI ABV UP PARAM F/U: HCPCS | Performed by: FAMILY MEDICINE

## 2018-07-10 PROCEDURE — 4040F PNEUMOC VAC/ADMIN/RCVD: CPT | Performed by: FAMILY MEDICINE

## 2018-07-10 RX ORDER — NYSTATIN 100000 U/G
CREAM TOPICAL
Qty: 30 G | Refills: 0 | Status: SHIPPED | OUTPATIENT
Start: 2018-07-10 | End: 2018-09-04 | Stop reason: ALTCHOICE

## 2018-07-10 NOTE — PROGRESS NOTES
Chief Complaint   Patient presents with    Rash       HPI:   Jarrett Polk is a 68 y.o. (: 1940) here today for rash under right breast    Patient's medications, allergies, past medical, surgical, social and family histories were reviewed and updated as appropriate on 7/10/2018 at 4:06 PM.    LDL Calculated (mg/dL)   Date Value   2017 57     Review of Systems  Additional review of systems may be scanned into the media section of this medical record. Any responses requiring further intervention were pursued. OBJECTIVE:  Estimated body mass index is 26.19 kg/m² as calculated from the following:    Height as of 18: 5' 3.5\" (1.613 m). Weight as of this encounter: 150 lb 3.2 oz (68.1 kg). Vitals:    07/10/18 1602 07/10/18 1605   BP: (!) 162/70 136/68   Site: Left Arm Left Arm   Position: Sitting Sitting   Cuff Size: Medium Adult Medium Adult   Pulse: 67    SpO2: 96%    Weight: 150 lb 3.2 oz (68.1 kg)      Physical Exam   Constitutional: She is oriented to person, place, and time. She appears well-developed and well-nourished. No distress. HENT:   Head: Normocephalic and atraumatic. Cardiovascular: Normal rate. Pulmonary/Chest: Effort normal. No respiratory distress. Neurological: She is alert and oriented to person, place, and time. Skin: Rash (red under right breast) noted. She is not diaphoretic. Psychiatric: She has a normal mood and affect. Her behavior is normal. Judgment and thought content normal.   Nursing note and vitals reviewed. ASSESSMENT PLAN        Diagnosis Orders   1. Intertrigo  nystatin (MYCOSTATIN) 194376 UNIT/GM cream   Patient will treat 3 times a day for a week. Then use when necessary. Cornstarch powder or another protective barrier in the skin folds as needed. Patient should call the office immediately with new or ongoing signs or symptoms or worsening, or proceed to the emergency room.   No changes in past medical history, past surgical

## 2018-09-04 ENCOUNTER — OFFICE VISIT (OUTPATIENT)
Dept: FAMILY MEDICINE CLINIC | Age: 78
End: 2018-09-04

## 2018-09-04 VITALS
BODY MASS INDEX: 25.1 KG/M2 | DIASTOLIC BLOOD PRESSURE: 68 MMHG | HEIGHT: 64 IN | SYSTOLIC BLOOD PRESSURE: 116 MMHG | HEART RATE: 87 BPM | WEIGHT: 147 LBS | OXYGEN SATURATION: 98 %

## 2018-09-04 DIAGNOSIS — N32.81 OVERACTIVE BLADDER: ICD-10-CM

## 2018-09-04 DIAGNOSIS — Z00.00 MEDICARE ANNUAL WELLNESS VISIT, SUBSEQUENT: ICD-10-CM

## 2018-09-04 DIAGNOSIS — Z00.00 ROUTINE GENERAL MEDICAL EXAMINATION AT A HEALTH CARE FACILITY: ICD-10-CM

## 2018-09-04 DIAGNOSIS — F32.9 REACTIVE DEPRESSION: ICD-10-CM

## 2018-09-04 DIAGNOSIS — Z00.00 MEDICARE ANNUAL WELLNESS VISIT, INITIAL: Primary | ICD-10-CM

## 2018-09-04 DIAGNOSIS — E05.90 HYPERTHYROIDISM: ICD-10-CM

## 2018-09-04 DIAGNOSIS — E05.00 GRAVES' OPHTHALMOPATHY: ICD-10-CM

## 2018-09-04 DIAGNOSIS — M51.36 LUMBAR DEGENERATIVE DISC DISEASE: ICD-10-CM

## 2018-09-04 DIAGNOSIS — R53.83 FATIGUE, UNSPECIFIED TYPE: ICD-10-CM

## 2018-09-04 LAB
A/G RATIO: 1.8 (ref 1.1–2.2)
ALBUMIN SERPL-MCNC: 4.2 G/DL (ref 3.4–5)
ALP BLD-CCNC: 107 U/L (ref 40–129)
ALT SERPL-CCNC: 9 U/L (ref 10–40)
ANION GAP SERPL CALCULATED.3IONS-SCNC: 10 MMOL/L (ref 3–16)
AST SERPL-CCNC: 15 U/L (ref 15–37)
BASOPHILS ABSOLUTE: 0 K/UL (ref 0–0.2)
BASOPHILS RELATIVE PERCENT: 0.7 %
BILIRUB SERPL-MCNC: 0.6 MG/DL (ref 0–1)
BUN BLDV-MCNC: 15 MG/DL (ref 7–20)
CALCIUM SERPL-MCNC: 9.6 MG/DL (ref 8.3–10.6)
CHLORIDE BLD-SCNC: 105 MMOL/L (ref 99–110)
CO2: 27 MMOL/L (ref 21–32)
CREAT SERPL-MCNC: 0.8 MG/DL (ref 0.6–1.2)
EOSINOPHILS ABSOLUTE: 0 K/UL (ref 0–0.6)
EOSINOPHILS RELATIVE PERCENT: 0.5 %
GFR AFRICAN AMERICAN: >60
GFR NON-AFRICAN AMERICAN: >60
GLOBULIN: 2.3 G/DL
GLUCOSE BLD-MCNC: 104 MG/DL (ref 70–99)
HCT VFR BLD CALC: 44.1 % (ref 36–48)
HEMOGLOBIN: 14.3 G/DL (ref 12–16)
LYMPHOCYTES ABSOLUTE: 1.2 K/UL (ref 1–5.1)
LYMPHOCYTES RELATIVE PERCENT: 17.8 %
MCH RBC QN AUTO: 29.8 PG (ref 26–34)
MCHC RBC AUTO-ENTMCNC: 32.3 G/DL (ref 31–36)
MCV RBC AUTO: 92.3 FL (ref 80–100)
MONOCYTES ABSOLUTE: 0.6 K/UL (ref 0–1.3)
MONOCYTES RELATIVE PERCENT: 8.6 %
NEUTROPHILS ABSOLUTE: 4.9 K/UL (ref 1.7–7.7)
NEUTROPHILS RELATIVE PERCENT: 72.4 %
PDW BLD-RTO: 14.2 % (ref 12.4–15.4)
PLATELET # BLD: 231 K/UL (ref 135–450)
PMV BLD AUTO: 9.5 FL (ref 5–10.5)
POTASSIUM SERPL-SCNC: 4.3 MMOL/L (ref 3.5–5.1)
RBC # BLD: 4.78 M/UL (ref 4–5.2)
SODIUM BLD-SCNC: 142 MMOL/L (ref 136–145)
TOTAL PROTEIN: 6.5 G/DL (ref 6.4–8.2)
WBC # BLD: 6.7 K/UL (ref 4–11)

## 2018-09-04 PROCEDURE — 4040F PNEUMOC VAC/ADMIN/RCVD: CPT | Performed by: FAMILY MEDICINE

## 2018-09-04 PROCEDURE — 36415 COLL VENOUS BLD VENIPUNCTURE: CPT | Performed by: FAMILY MEDICINE

## 2018-09-04 PROCEDURE — G0438 PPPS, INITIAL VISIT: HCPCS | Performed by: FAMILY MEDICINE

## 2018-09-04 RX ORDER — ESCITALOPRAM OXALATE 10 MG/1
10 TABLET ORAL DAILY
Qty: 30 TABLET | Refills: 3 | Status: SHIPPED | OUTPATIENT
Start: 2018-09-04 | End: 2018-11-26 | Stop reason: SDUPTHER

## 2018-09-04 ASSESSMENT — PATIENT HEALTH QUESTIONNAIRE - PHQ9
SUM OF ALL RESPONSES TO PHQ QUESTIONS 1-9: 2
SUM OF ALL RESPONSES TO PHQ QUESTIONS 1-9: 2

## 2018-09-04 ASSESSMENT — LIFESTYLE VARIABLES: HOW OFTEN DO YOU HAVE A DRINK CONTAINING ALCOHOL: 0

## 2018-09-04 ASSESSMENT — ANXIETY QUESTIONNAIRES: GAD7 TOTAL SCORE: 2

## 2018-09-04 NOTE — PROGRESS NOTES
indicated follow up appointments were made and/or referrals ordered. Positive Risk Factor Screenings with Interventions:     Health Habits/Nutrition:  Health Habits/Nutrition  Do you exercise for at least 20 minutes 2-3 times per week?: (!) No  Have you lost any weight without trying in the past 3 months?: No  Do you eat fewer than 2 meals per day?: No  Have you seen a dentist within the past year?: Yes  Body mass index is 25.63 kg/m². Health Habits/Nutrition Interventions:  · None indicated    Safety:  Safety  Do you have working smoke detectors?: Yes  Have all throw rugs been removed or fastened?: (!) No  Do you have non-slip mats in all bathtubs?: Yes  Do all of your stairways have a railing or banister?: (!) No (no stairs)  Are your doorways, halls and stairs free of clutter?: (!) No (no stairs)  Do you always fasten your seatbelt when you are in a car?: Yes  Safety Interventions:  · None indicated    Personalized Preventive Plan   Current Health Maintenance Status  Immunization History   Administered Date(s) Administered    Influenza Vaccine, unspecified formulation 10/13/2016    Influenza Virus Vaccine 11/03/2015, 10/11/2017    Pneumococcal 13-valent Conjugate ([id number]) 04/14/2015    Pneumococcal Polysaccharide ([id number]) 05/08/2017    Tdap (Boostrix, Adacel) 05/09/2017    Zoster Subunit (Shingrix) 08/01/2018        Health Maintenance   Topic Date Due    Flu vaccine (1) 09/01/2018    TSH testing  11/08/2018    Shingles Vaccine (2 of 2 - 2 Dose Series) 02/01/2019    DTaP/Tdap/Td vaccine (2 - Td) 05/09/2027    DEXA (modify frequency per FRAX score)  Addressed    Pneumococcal low/med risk  Completed     Recommendations for Preventive Services Due: see orders and patient instructions/AVS.  . Recommended screening schedule for the next 5-10 years is provided to the patient in written form: see Patient Instructions/AVS.    Assessment/Plan:  Sandy  was seen today for medicare awv.     Diagnoses and all orders for this visit:    Medicare annual wellness visit, subsequent    Hyperthyroidism  -     TSH without Reflex  -     T4, FREE  -     T3, FREE    Fatigue, unspecified type  -     CBC WITH AUTO DIFFERENTIAL  -     COMPREHENSIVE METABOLIC PANEL    Graves' ophthalmopathy    Lumbar degenerative disc disease    Overactive bladder    Reactive depression  -     escitalopram (LEXAPRO) 10 MG tablet; Take 1 tablet by mouth daily      Because of the raspiness and hoarseness, fatigue we will update her thyroid for replacement. I believe depression may well be contributing to many of her symptoms. Call in one month if she is no better so we can adjust the Lexapro. Is to be updated. Regular follow-up in 6 months. She has advanced directives but had forgotten to bring a copy to us. Patient should call the office immediately with new or ongoing signs or symptoms or worsening, or proceed to the emergency room. No changes in past medical history, past surgical history, social history, or family history were noted during the patient encounter unless specifically listed above. All updates of past medical history, past surgical history, social history, or family history were reviewed personally by me during the office visit. All problems listed in the assessment are stable unless noted otherwise. Medication profile reviewed personally by me during the office visit. Medication side effects and possible impairments from medications were discussed as applicable. This document was prepared by a combination of typing and transcription through a voice recognition software. I, Dr. Neftali Day, personally performed the services described in this documentation, as scribed by the above signed scribe in my presence, and it is both accurate and complete.  I agree with the ROS and Past Histories independently gathered by the clinical support staff and the remaining scribed note accurately describes my personal History   Problem Relation Age of Onset    Heart Disease Mother     Heart Disease Father        CareTeam (Including outside providers/suppliers regularly involved in providing care):   Patient Care Team:  Denis Steve MD as PCP - General (Family Medicine)  Denis Steve MD as PCP - S Attributed Provider    Wt Readings from Last 3 Encounters:   09/04/18 147 lb (66.7 kg)   07/10/18 150 lb 3.2 oz (68.1 kg)   02/09/18 156 lb 9.6 oz (71 kg)     Vitals:    09/04/18 1334   BP: 116/68   Site: Left Arm   Position: Sitting   Cuff Size: Medium Adult   Pulse: 87   SpO2: 98%   Weight: 147 lb (66.7 kg)   Height: 5' 3.5\" (1.613 m)     Body mass index is 25.63 kg/m². See above    Patient's complete Health Risk Assessment and screening values have been reviewed and are found in Flowsheets. The following problems were reviewed today and where indicated follow up appointments were made and/or referrals ordered. Positive Risk Factor Screenings with Interventions:     Health Habits/Nutrition:  Health Habits/Nutrition  Do you exercise for at least 20 minutes 2-3 times per week?: (!) No  Have you lost any weight without trying in the past 3 months?: No  Do you eat fewer than 2 meals per day?: No  Have you seen a dentist within the past year?: Yes  Body mass index is 25.63 kg/m².   Health Habits/Nutrition Interventions:  · None indicated    Safety:  Safety  Do you have working smoke detectors?: Yes  Have all throw rugs been removed or fastened?: (!) No  Do you have non-slip mats in all bathtubs?: Yes  Do all of your stairways have a railing or banister?: (!) No (no stairs)  Are your doorways, halls and stairs free of clutter?: (!) No (no stairs)  Do you always fasten your seatbelt when you are in a car?: Yes  Safety Interventions:  · None indicated    Personalized Preventive Plan   Current Health Maintenance Status  Immunization History   Administered Date(s) Administered    Influenza Vaccine, unspecified formulation 10/13/2016    Influenza Virus Vaccine 11/03/2015, 10/11/2017    Pneumococcal 13-valent Conjugate ([id number]) 04/14/2015    Pneumococcal Polysaccharide ([id number]) 05/08/2017    Tdap (Boostrix, Adacel) 05/09/2017    Zoster Subunit (Shingrix) 08/01/2018        Health Maintenance   Topic Date Due    Flu vaccine (1) 09/01/2018    TSH testing  11/08/2018    Shingles Vaccine (2 of 2 - 2 Dose Series) 02/01/2019    DTaP/Tdap/Td vaccine (2 - Td) 05/09/2027    DEXA (modify frequency per FRAX score)  Addressed    Pneumococcal low/med risk  Completed     Recommendations for Preventive Services Due: see orders and patient instructions/AVS.  .   Recommended screening schedule for the next 5-10 years is provided to the patient in written form: see Patient Instructions/AVS.

## 2018-09-04 NOTE — PATIENT INSTRUCTIONS
exercise per week or 10,000 steps per day on a pedometer . · Order or download the FREE \"Exercise & Physical Activity: Your Everyday Guide\" from [employer]. Call 2-304.578.3014 or search [employer] online. · You need 0290-0547 mg of calcium and 3907-1748 IU of vitamin D per day. It is possible to meet your calcium requirement with diet alone, but a vitamin D supplement is usually necessary to meet this goal.  · When exposed to the sun, use a sunscreen that protects against both UVA and UVB radiation with an SPF of 30 or greater. Reapply every 2 to 3 hours or after sweating, drying off with a towel, or swimming. · Always wear a seat belt when traveling in a car. Always wear a helmet when riding a bicycle or motorcycle.

## 2018-09-05 LAB
T3 FREE: 2.5 PG/ML (ref 2.3–4.2)
T4 FREE: 1.7 NG/DL (ref 0.9–1.8)
TSH SERPL DL<=0.05 MIU/L-ACNC: 0.56 UIU/ML (ref 0.27–4.2)

## 2018-11-26 DIAGNOSIS — F32.9 REACTIVE DEPRESSION: ICD-10-CM

## 2018-11-26 RX ORDER — ESCITALOPRAM OXALATE 10 MG/1
10 TABLET ORAL DAILY
Qty: 90 TABLET | Refills: 3 | Status: SHIPPED | OUTPATIENT
Start: 2018-11-26 | End: 2018-12-20 | Stop reason: SDUPTHER

## 2018-12-20 DIAGNOSIS — F32.9 REACTIVE DEPRESSION: ICD-10-CM

## 2018-12-20 RX ORDER — ESCITALOPRAM OXALATE 10 MG/1
10 TABLET ORAL DAILY
Qty: 90 TABLET | Refills: 1 | Status: SHIPPED | OUTPATIENT
Start: 2018-12-20 | End: 2019-09-10 | Stop reason: SDUPTHER

## 2018-12-21 ENCOUNTER — HOSPITAL ENCOUNTER (OUTPATIENT)
Dept: MAMMOGRAPHY | Age: 78
Discharge: HOME | End: 2018-12-21
Payer: MEDICARE

## 2018-12-21 DIAGNOSIS — Z12.31 VISIT FOR SCREENING MAMMOGRAM: ICD-10-CM

## 2018-12-21 PROCEDURE — 77063 BREAST TOMOSYNTHESIS BI: CPT

## 2019-01-03 DIAGNOSIS — E78.2 MIXED HYPERLIPIDEMIA: ICD-10-CM

## 2019-01-04 RX ORDER — ATORVASTATIN CALCIUM 10 MG/1
TABLET, FILM COATED ORAL
Qty: 90 TABLET | Refills: 3 | Status: SHIPPED | OUTPATIENT
Start: 2019-01-04 | End: 2019-12-09 | Stop reason: SDUPTHER

## 2019-01-08 DIAGNOSIS — R92.8 ABNORMAL FINDINGS ON DIAGNOSTIC IMAGING OF BREAST: Primary | ICD-10-CM

## 2019-01-14 ENCOUNTER — HOSPITAL ENCOUNTER (OUTPATIENT)
Dept: MAMMOGRAPHY | Age: 79
Discharge: HOME | End: 2019-01-14
Payer: MEDICARE

## 2019-01-14 DIAGNOSIS — R92.8 ABNORMAL FINDINGS ON DIAGNOSTIC IMAGING OF BREAST: ICD-10-CM

## 2019-01-15 ENCOUNTER — OFFICE VISIT (OUTPATIENT)
Dept: DERMATOLOGY | Age: 79
End: 2019-01-15
Payer: MEDICARE

## 2019-01-15 DIAGNOSIS — L40.9 PSORIASIS: Primary | ICD-10-CM

## 2019-01-15 PROCEDURE — 99212 OFFICE O/P EST SF 10 MIN: CPT | Performed by: DERMATOLOGY

## 2019-01-15 RX ORDER — FLUOCINONIDE TOPICAL SOLUTION USP, 0.05% 0.5 MG/ML
SOLUTION TOPICAL
Qty: 60 ML | Refills: 5 | Status: SHIPPED | OUTPATIENT
Start: 2019-01-15 | End: 2019-09-04

## 2019-01-16 ENCOUNTER — TELEPHONE (OUTPATIENT)
Dept: DERMATOLOGY | Age: 79
End: 2019-01-16

## 2019-02-11 ENCOUNTER — TELEPHONE (OUTPATIENT)
Dept: DERMATOLOGY | Age: 79
End: 2019-02-11

## 2019-02-11 NOTE — TELEPHONE ENCOUNTER
Tried to call patient to inform patient to contact [address] [place] [address] specialty pharmacy to inquire about refills, Dr. Ruth Lawton sent Marietta Lefort starter & maintenance with refills to pharm.

## 2019-02-15 NOTE — TELEPHONE ENCOUNTER
Received faxed notification from [facility]. Per patient's insurance medication was switched to Sush[facility],   Pharmacy phone # [phone]. Please inform patient to contact [facility] in inquiring about refills.

## 2019-02-15 NOTE — TELEPHONE ENCOUNTER
Patient called back and she had talked with [facility] and have her the # [phone]. She has been in touch with them and she is to have a shipment delivered today 2/15.

## 2019-02-27 ENCOUNTER — OFFICE VISIT (OUTPATIENT)
Dept: DERMATOLOGY | Age: 79
End: 2019-02-27
Payer: MEDICARE

## 2019-02-27 DIAGNOSIS — L21.9 SEBORRHEA: ICD-10-CM

## 2019-02-27 DIAGNOSIS — L40.9 PSORIASIS: Primary | ICD-10-CM

## 2019-02-27 PROCEDURE — 99213 OFFICE O/P EST LOW 20 MIN: CPT | Performed by: DERMATOLOGY

## 2019-02-27 RX ORDER — KETOCONAZOLE 20 MG/ML
SHAMPOO TOPICAL
Qty: 120 ML | Refills: 11 | Status: SHIPPED | OUTPATIENT
Start: 2019-02-27 | End: 2020-07-24

## 2019-03-12 ENCOUNTER — OFFICE VISIT (OUTPATIENT)
Dept: FAMILY MEDICINE CLINIC | Age: 79
End: 2019-03-12
Payer: MEDICARE

## 2019-03-12 VITALS
OXYGEN SATURATION: 99 % | DIASTOLIC BLOOD PRESSURE: 78 MMHG | WEIGHT: 145.4 LBS | HEART RATE: 77 BPM | SYSTOLIC BLOOD PRESSURE: 124 MMHG | BODY MASS INDEX: 25.35 KG/M2

## 2019-03-12 DIAGNOSIS — R25.2 LEG CRAMPS: ICD-10-CM

## 2019-03-12 DIAGNOSIS — F41.8 SITUATIONAL ANXIETY: ICD-10-CM

## 2019-03-12 DIAGNOSIS — E78.2 MIXED HYPERLIPIDEMIA: ICD-10-CM

## 2019-03-12 DIAGNOSIS — M16.11 PRIMARY OSTEOARTHRITIS OF RIGHT HIP: ICD-10-CM

## 2019-03-12 DIAGNOSIS — E05.90 HYPERTHYROIDISM: ICD-10-CM

## 2019-03-12 DIAGNOSIS — L40.9 PSORIASIS: ICD-10-CM

## 2019-03-12 DIAGNOSIS — F32.9 REACTIVE DEPRESSION: Primary | ICD-10-CM

## 2019-03-12 DIAGNOSIS — R25.2 MUSCLE CRAMPS: ICD-10-CM

## 2019-03-12 DIAGNOSIS — E05.00 GRAVES' OPHTHALMOPATHY: ICD-10-CM

## 2019-03-12 DIAGNOSIS — R53.83 FATIGUE, UNSPECIFIED TYPE: ICD-10-CM

## 2019-03-12 DIAGNOSIS — M17.11 PRIMARY OSTEOARTHRITIS OF RIGHT KNEE: ICD-10-CM

## 2019-03-12 PROCEDURE — 1036F TOBACCO NON-USER: CPT | Performed by: FAMILY MEDICINE

## 2019-03-12 PROCEDURE — 1123F ACP DISCUSS/DSCN MKR DOCD: CPT | Performed by: FAMILY MEDICINE

## 2019-03-12 PROCEDURE — 99214 OFFICE O/P EST MOD 30 MIN: CPT | Performed by: FAMILY MEDICINE

## 2019-03-12 PROCEDURE — G8482 FLU IMMUNIZE ORDER/ADMIN: HCPCS | Performed by: FAMILY MEDICINE

## 2019-03-12 PROCEDURE — G8417 CALC BMI ABV UP PARAM F/U: HCPCS | Performed by: FAMILY MEDICINE

## 2019-03-12 PROCEDURE — G8427 DOCREV CUR MEDS BY ELIG CLIN: HCPCS | Performed by: FAMILY MEDICINE

## 2019-03-12 PROCEDURE — G8399 PT W/DXA RESULTS DOCUMENT: HCPCS | Performed by: FAMILY MEDICINE

## 2019-03-12 PROCEDURE — 1090F PRES/ABSN URINE INCON ASSESS: CPT | Performed by: FAMILY MEDICINE

## 2019-03-12 PROCEDURE — 1101F PT FALLS ASSESS-DOCD LE1/YR: CPT | Performed by: FAMILY MEDICINE

## 2019-03-12 PROCEDURE — 4040F PNEUMOC VAC/ADMIN/RCVD: CPT | Performed by: FAMILY MEDICINE

## 2019-03-12 RX ORDER — ESTRADIOL 0.03 MG/D
1 FILM, EXTENDED RELEASE TRANSDERMAL
COMMUNITY
Start: 2018-12-13 | End: 2019-03-12 | Stop reason: ALTCHOICE

## 2019-03-12 ASSESSMENT — PATIENT HEALTH QUESTIONNAIRE - PHQ9
2. FEELING DOWN, DEPRESSED OR HOPELESS: 1
SUM OF ALL RESPONSES TO PHQ9 QUESTIONS 1 & 2: 2
SUM OF ALL RESPONSES TO PHQ QUESTIONS 1-9: 2
1. LITTLE INTEREST OR PLEASURE IN DOING THINGS: 1
SUM OF ALL RESPONSES TO PHQ QUESTIONS 1-9: 2

## 2019-03-13 ENCOUNTER — NURSE ONLY (OUTPATIENT)
Dept: FAMILY MEDICINE CLINIC | Age: 79
End: 2019-03-13
Payer: MEDICARE

## 2019-03-13 DIAGNOSIS — R25.2 MUSCLE CRAMPS: ICD-10-CM

## 2019-03-13 DIAGNOSIS — E78.2 MIXED HYPERLIPIDEMIA: ICD-10-CM

## 2019-03-13 DIAGNOSIS — E05.90 HYPERTHYROIDISM: ICD-10-CM

## 2019-03-13 LAB
A/G RATIO: 2.2 (ref 1.1–2.2)
ALBUMIN SERPL-MCNC: 4.4 G/DL (ref 3.4–5)
ALP BLD-CCNC: 106 U/L (ref 40–129)
ALT SERPL-CCNC: 8 U/L (ref 10–40)
ANION GAP SERPL CALCULATED.3IONS-SCNC: 12 MMOL/L (ref 3–16)
AST SERPL-CCNC: 13 U/L (ref 15–37)
BILIRUB SERPL-MCNC: 0.6 MG/DL (ref 0–1)
BUN BLDV-MCNC: 13 MG/DL (ref 7–20)
CALCIUM SERPL-MCNC: 9.4 MG/DL (ref 8.3–10.6)
CHLORIDE BLD-SCNC: 104 MMOL/L (ref 99–110)
CHOLESTEROL, TOTAL: 140 MG/DL (ref 0–199)
CO2: 26 MMOL/L (ref 21–32)
CREAT SERPL-MCNC: 0.8 MG/DL (ref 0.6–1.2)
GFR AFRICAN AMERICAN: >60
GFR NON-AFRICAN AMERICAN: >60
GLOBULIN: 2 G/DL
GLUCOSE BLD-MCNC: 94 MG/DL (ref 70–99)
HDLC SERPL-MCNC: 73 MG/DL (ref 40–60)
LDL CHOLESTEROL CALCULATED: 41 MG/DL
MAGNESIUM: 2.5 MG/DL (ref 1.8–2.4)
POTASSIUM SERPL-SCNC: 4.7 MMOL/L (ref 3.5–5.1)
SODIUM BLD-SCNC: 142 MMOL/L (ref 136–145)
T3 FREE: 2.7 PG/ML (ref 2.3–4.2)
T4 FREE: 1.7 NG/DL (ref 0.9–1.8)
TOTAL PROTEIN: 6.4 G/DL (ref 6.4–8.2)
TRIGL SERPL-MCNC: 128 MG/DL (ref 0–150)
TSH SERPL DL<=0.05 MIU/L-ACNC: 1.14 UIU/ML (ref 0.27–4.2)
VLDLC SERPL CALC-MCNC: 26 MG/DL

## 2019-03-13 PROCEDURE — 36415 COLL VENOUS BLD VENIPUNCTURE: CPT | Performed by: FAMILY MEDICINE

## 2019-04-01 RX ORDER — LEVOTHYROXINE SODIUM 75 MCG
TABLET ORAL
Qty: 90 TABLET | Refills: 3 | Status: SHIPPED | OUTPATIENT
Start: 2019-04-01 | End: 2019-05-08

## 2019-04-08 DIAGNOSIS — E05.90 HYPERTHYROIDISM: Primary | ICD-10-CM

## 2019-04-08 NOTE — TELEPHONE ENCOUNTER
I spoke with pt who stated that the letter states that [facility] needs additional information to determine coverage of the requested prescription. This sounds to me like it may need reauthorization with insurance since she gets the brand name Synthroid as opposed to the generic. Pt to bring the letter to the office today so we know how to proceed. She has a couple weeks worth of medication left.

## 2019-04-09 ENCOUNTER — TELEPHONE (OUTPATIENT)
Dept: DERMATOLOGY | Age: 79
End: 2019-04-09

## 2019-04-09 RX ORDER — LEVOTHYROXINE SODIUM 0.07 MG/1
75 TABLET ORAL DAILY
Qty: 90 TABLET | Refills: 1 | Status: SHIPPED | OUTPATIENT
Start: 2019-04-09 | End: 2019-07-08 | Stop reason: ALTCHOICE

## 2019-04-09 NOTE — TELEPHONE ENCOUNTER
Can first try to see if fiber supplements (to bulk up stool) and pink bismuth (pepto bismol) help. If not, then agree with stopping medication since this can be an unfortunate side effect. We will have to go back to drawing board potentially at that point.  Please inform    VR

## 2019-04-09 NOTE — TELEPHONE ENCOUNTER
Pt c/b [phone] or cell [phone]  Pt states:   - medication Otezla   - diarrhea getting worse   - tension headache is better   - tightness in chest is better    - wants to know what to do at this point   - not going to be able to continue medication    - took medication last yesterday    - not going to take it today  Please call to discuss thanks

## 2019-04-09 NOTE — TELEPHONE ENCOUNTER
Informed patient. Patient will try fiber supplement and pepto bismol x 1 week and if no improvement, will call office.

## 2019-05-07 ENCOUNTER — TELEPHONE (OUTPATIENT)
Dept: DERMATOLOGY | Age: 79
End: 2019-05-07

## 2019-05-08 ENCOUNTER — HOSPITAL ENCOUNTER (OUTPATIENT)
Dept: GENERAL RADIOLOGY | Age: 79
Discharge: HOME | End: 2019-05-08
Payer: MEDICARE

## 2019-05-08 ENCOUNTER — OFFICE VISIT (OUTPATIENT)
Dept: FAMILY MEDICINE CLINIC | Age: 79
End: 2019-05-08
Payer: MEDICARE

## 2019-05-08 ENCOUNTER — HOSPITAL ENCOUNTER (OUTPATIENT)
Age: 79
Discharge: HOME | End: 2019-05-08
Payer: MEDICARE

## 2019-05-08 VITALS
OXYGEN SATURATION: 99 % | DIASTOLIC BLOOD PRESSURE: 62 MMHG | BODY MASS INDEX: 25.27 KG/M2 | HEART RATE: 81 BPM | WEIGHT: 148 LBS | TEMPERATURE: 98 F | SYSTOLIC BLOOD PRESSURE: 128 MMHG | HEIGHT: 64 IN

## 2019-05-08 DIAGNOSIS — R00.2 PALPITATIONS: ICD-10-CM

## 2019-05-08 DIAGNOSIS — R05.9 COUGH: ICD-10-CM

## 2019-05-08 DIAGNOSIS — R05.9 COUGH: Primary | ICD-10-CM

## 2019-05-08 DIAGNOSIS — R01.1 HEART MURMUR: ICD-10-CM

## 2019-05-08 DIAGNOSIS — G93.31 POSTVIRAL FATIGUE SYNDROME: ICD-10-CM

## 2019-05-08 DIAGNOSIS — R06.02 SHORT OF BREATH ON EXERTION: ICD-10-CM

## 2019-05-08 PROCEDURE — G8417 CALC BMI ABV UP PARAM F/U: HCPCS | Performed by: NURSE PRACTITIONER

## 2019-05-08 PROCEDURE — 99215 OFFICE O/P EST HI 40 MIN: CPT | Performed by: NURSE PRACTITIONER

## 2019-05-08 PROCEDURE — G8399 PT W/DXA RESULTS DOCUMENT: HCPCS | Performed by: NURSE PRACTITIONER

## 2019-05-08 PROCEDURE — 4040F PNEUMOC VAC/ADMIN/RCVD: CPT | Performed by: NURSE PRACTITIONER

## 2019-05-08 PROCEDURE — 93000 ELECTROCARDIOGRAM COMPLETE: CPT | Performed by: NURSE PRACTITIONER

## 2019-05-08 PROCEDURE — G8427 DOCREV CUR MEDS BY ELIG CLIN: HCPCS | Performed by: NURSE PRACTITIONER

## 2019-05-08 PROCEDURE — 1123F ACP DISCUSS/DSCN MKR DOCD: CPT | Performed by: NURSE PRACTITIONER

## 2019-05-08 PROCEDURE — 1036F TOBACCO NON-USER: CPT | Performed by: NURSE PRACTITIONER

## 2019-05-08 PROCEDURE — 71046 X-RAY EXAM CHEST 2 VIEWS: CPT

## 2019-05-08 PROCEDURE — 1090F PRES/ABSN URINE INCON ASSESS: CPT | Performed by: NURSE PRACTITIONER

## 2019-05-08 PROCEDURE — 94640 AIRWAY INHALATION TREATMENT: CPT | Performed by: NURSE PRACTITIONER

## 2019-05-08 RX ORDER — BENZONATATE 100 MG/1
100-200 CAPSULE ORAL 3 TIMES DAILY PRN
Qty: 60 CAPSULE | Refills: 0 | Status: SHIPPED | OUTPATIENT
Start: 2019-05-08 | End: 2019-05-15

## 2019-05-08 RX ORDER — FLUTICASONE PROPIONATE 50 MCG
1 SPRAY, SUSPENSION (ML) NASAL DAILY
Qty: 1 BOTTLE | Refills: 0 | Status: SHIPPED | OUTPATIENT
Start: 2019-05-08 | End: 2019-06-11 | Stop reason: ALTCHOICE

## 2019-05-08 RX ADMIN — Medication 0.5 MG: at 17:18

## 2019-05-08 ASSESSMENT — ENCOUNTER SYMPTOMS
WHEEZING: 0
RHINORRHEA: 1
GASTROINTESTINAL NEGATIVE: 1
BACK PAIN: 0
EYE PAIN: 0
APNEA: 0
STRIDOR: 0
ALLERGIC/IMMUNOLOGIC NEGATIVE: 1
SHORTNESS OF BREATH: 0
EYE DISCHARGE: 0
DIARRHEA: 0
SORE THROAT: 0
SINUS PAIN: 0
EYE ITCHING: 0
COUGH: 1
SINUS PRESSURE: 0
COLOR CHANGE: 0
EYE REDNESS: 0
TROUBLE SWALLOWING: 0
HEMOPTYSIS: 0
VOMITING: 0
PHOTOPHOBIA: 0
CHEST TIGHTNESS: 0
FACIAL SWELLING: 0
BLOOD IN STOOL: 0
CHOKING: 0
ABDOMINAL PAIN: 0
CONSTIPATION: 0
HEARTBURN: 0
NAUSEA: 0

## 2019-05-08 NOTE — PROGRESS NOTES
Apremilast (OTEZLA) 30 MG TABS Take by mouth       No current facility-administered medications for this visit. Allergies   Allergen Reactions    Bacitracin        Subjective:      Review of Systems   Constitutional: Positive for activity change (R/t fatigue) and fatigue. Negative for appetite change, chills, diaphoresis, fever, unexpected weight change and weight loss. HENT: Positive for postnasal drip and rhinorrhea. Negative for congestion, dental problem, drooling, ear discharge, ear pain, facial swelling, hearing loss, mouth sores, nosebleeds, sinus pressure, sinus pain, sneezing, sore throat and trouble swallowing. Eyes: Negative for photophobia, pain, discharge, redness, itching and visual disturbance. Respiratory: Positive for cough. Negative for apnea, hemoptysis, choking, chest tightness, shortness of breath, wheezing and stridor. Cardiovascular: Negative for chest pain, palpitations and leg swelling. Gastrointestinal: Negative. Negative for abdominal pain, blood in stool, constipation, diarrhea, heartburn, nausea and vomiting. Genitourinary: Negative. Negative for decreased urine volume, difficulty urinating, dysuria, enuresis, flank pain, frequency, genital sores, hematuria and urgency. Musculoskeletal: Negative. Negative for arthralgias, back pain, gait problem, joint swelling, myalgias, neck pain and neck stiffness. Skin: Negative. Negative for color change, pallor, rash and wound. Allergic/Immunologic: Negative. Negative for environmental allergies. Neurological: Positive for headaches. Negative for dizziness, facial asymmetry, weakness and light-headedness. Psychiatric/Behavioral: Negative for agitation, behavioral problems, confusion, decreased concentration, dysphoric mood, hallucinations, self-injury, sleep disturbance and suicidal ideas. The patient is not nervous/anxious and is not hyperactive.           Objective:     Vitals:    05/08/19 1254   BP: 128/62 Site: Left Upper Arm   Position: Sitting   Cuff Size: Medium Adult   Pulse: 81   Temp: 98 °F (36.7 °C)   TempSrc: Oral   SpO2: 99%   Weight: 148 lb (67.1 kg)   Height: 5' 3.5\" (1.613 m)     Wt Readings from Last 3 Encounters:   05/08/19 148 lb (67.1 kg)   03/12/19 145 lb 6.4 oz (66 kg)   09/04/18 147 lb (66.7 kg)     Temp Readings from Last 3 Encounters:   05/08/19 98 °F (36.7 °C) (Oral)   02/09/18 97.2 °F (36.2 °C) (Temporal)   06/08/17 98.3 °F (36.8 °C) (Oral)     BP Readings from Last 3 Encounters:   05/08/19 128/62   03/12/19 124/78   09/04/18 116/68     Pulse Readings from Last 3 Encounters:   05/08/19 81   03/12/19 77   09/04/18 87     EKG: normal sinus rhythm. Physical Exam   Constitutional: She is oriented to person, place, and time. She appears well-developed and well-nourished. No distress. HENT:   Head: Normocephalic and atraumatic. Right Ear: External ear normal. No middle ear effusion. Left Ear: External ear normal.  No middle ear effusion. Nose: Rhinorrhea present. No mucosal edema. Right sinus exhibits no maxillary sinus tenderness and no frontal sinus tenderness. Left sinus exhibits no maxillary sinus tenderness and no frontal sinus tenderness. Mouth/Throat: Posterior oropharyngeal erythema (Mild) present. No oropharyngeal exudate. Eyes: Conjunctivae and EOM are normal. Right eye exhibits no discharge. Left eye exhibits no discharge. No scleral icterus. Neck: Normal range of motion. Neck supple. No tracheal deviation present. Cardiovascular: Normal rate, regular rhythm and intact distal pulses. Occasional extrasystoles are present. Exam reveals no gallop and no friction rub. Murmur heard. Diastolic murmur is present with a grade of 2/6. Pulmonary/Chest: Effort normal and breath sounds normal. No stridor. No respiratory distress. She has no wheezes. She has no rales. She exhibits no tenderness. Abdominal: Soft.  Bowel sounds are normal. She exhibits no distension and no 0.0 - 1.0 mg/dL Final    Alkaline Phosphatase 03/13/2019 106  40 - 129 U/L Final    ALT 03/13/2019 8* 10 - 40 U/L Final    AST 03/13/2019 13* 15 - 37 U/L Final    Globulin 03/13/2019 2.0  g/dL Final    T4 Free 03/13/2019 1.7  0.9 - 1.8 ng/dL Final    TSH 03/13/2019 1.14  0.27 - 4.20 uIU/mL Final    Cholesterol, Total 03/13/2019 140  0 - 199 mg/dL Final    Triglycerides 03/13/2019 128  0 - 150 mg/dL Final    HDL 03/13/2019 73* 40 - 60 mg/dL Final    LDL Calculated 03/13/2019 41  <100 mg/dL Final    VLDL Cholesterol Calculated 03/13/2019 26  Not Established mg/dL Final           Assessment & Plan: The following diagnoses and conditions are stable with no further orders unless indicated:  1. Cough    2. Postviral fatigue syndrome    3. Palpitations    4. Short of breath on exertion    5. Heart murmur        Lizbeth No was seen today for cough. Lungs clear and breathing treatment did not help with her symptoms. Concern that her cough is from her heart murmur. It seems as if she has never been diagnosed with a heart murmur before. Her heart murmur is diastolic. Concern for artery stenosis or pulmonic or aortic regurgitation. Recommend echocardiogram.  If echocardiogram is stable, would recommend her getting CT scan of chest and do a trial of a PPI. Flonase for rhinorrhea and sinus drainage. Diagnoses and all orders for this visit:    Cough  -     XR CHEST STANDARD (2 VW); Future  -     ipratropium (ATROVENT) 0.02 % nebulizer solution 0.5 mg  -     benzonatate (TESSALON) 100 MG capsule; Take 1-2 capsules by mouth 3 times daily as needed for Cough  -     fluticasone (FLONASE) 50 MCG/ACT nasal spray; 1 spray by Nasal route daily    Postviral fatigue syndrome    Palpitations  -     ECHO Complete 2D W Doppler W Color; Future  -     EKG 12 Lead    Short of breath on exertion  -     ECHO Complete 2D W Doppler W Color; Future  -     XR CHEST STANDARD (2 VW);  Future  -     ipratropium (ATROVENT) 0.02 % the office immediately with new or ongoing signs or symptoms or worsening, or proceedto the emergency room. No changes in past medical history, past surgical history, social history, or family history were noted during the patient encounter unless specifically listed above. All updates of past medicalhistory, past surgical history, social history, or family history were reviewed personally by me during the office visit. All problems listed in the assessment are stable unless noted otherwise. Medication profilereviewed personally by me during the office visit. Medication side effects and possible impairments from medications were discussed as applicable.     Call if pattern of symptoms change or persists for an extended time. This document was prepared by a combination of typing and transcription through a voice recognition software. This provider spent 40 minutes in the room with the patient with 50% or greater being utilized on patient education. Patient educated on causes of cough, Tessalon Pearls, echocardiogram, heart murmur.

## 2019-05-09 NOTE — PATIENT INSTRUCTIONS
you may need emergency care. For example, call if:    · You have severe trouble breathing.     · You cough up pink, foamy mucus and you have trouble breathing.     · You passed out (lost consciousness).     · You have a seizure.     · You have symptoms of a stroke. These may include:  ? Sudden numbness, tingling, weakness, or loss of movement in your face, arm, or leg, especially on only one side of your body. ? Sudden vision changes. ? Sudden trouble speaking. ? Sudden confusion or trouble understanding simple statements. ? Sudden problems with walking or balance. ? A sudden, severe headache that is different from past headaches.    Call your doctor now or seek immediate medical care if:    · You have new or increased shortness of breath.     · You feel dizzy or lightheaded, or you feel like you may faint.     · You have sudden weight gain, such as more than 2 to 3 pounds in a day or 5 pounds in a week. (Your doctor may suggest a different range of weight gain.)     · You have increased swelling in your legs or feet.     · You have a fever.    Watch closely for changes in your health, and be sure to contact your doctor if you have any problems. Where can you learn more? Go to [email or web address].[email or web address]. org and sign in to your [employer] account. Enter O649 in the [employer] box to learn more about \"Heart Murmur: Care Instructions. \"     If you do not have an account, please click on the \"Sign Up Now\" link. Current as of: July 22, 2018  Content Version: 12.0  © 0832-2828 [employer]wise, Incorporated. Care instructions adapted under license by [facility] ([facility]). If you have questions about a medical condition or this instruction, always ask your healthcare professional. [place] [address] any warranty or liability for your use of this information.

## 2019-05-17 ENCOUNTER — HOSPITAL ENCOUNTER (OUTPATIENT)
Dept: NON INVASIVE DIAGNOSTICS | Age: 79
Discharge: HOME | End: 2019-05-17
Payer: MEDICARE

## 2019-05-17 DIAGNOSIS — R00.2 PALPITATIONS: ICD-10-CM

## 2019-05-17 DIAGNOSIS — R01.1 HEART MURMUR: ICD-10-CM

## 2019-05-17 DIAGNOSIS — R05.9 COUGH: Primary | ICD-10-CM

## 2019-05-17 DIAGNOSIS — R06.02 SHORT OF BREATH ON EXERTION: ICD-10-CM

## 2019-05-17 LAB
LV EF: 58 %
LVEF MODALITY: NORMAL

## 2019-05-17 PROCEDURE — 93306 TTE W/DOPPLER COMPLETE: CPT

## 2019-05-17 RX ORDER — AZITHROMYCIN 250 MG/1
250 TABLET, FILM COATED ORAL SEE ADMIN INSTRUCTIONS
Qty: 6 TABLET | Refills: 0 | Status: SHIPPED | OUTPATIENT
Start: 2019-05-17 | End: 2019-05-22

## 2019-05-17 RX ORDER — OMEPRAZOLE 40 MG/1
CAPSULE, DELAYED RELEASE ORAL
Qty: 30 CAPSULE | Refills: 1 | Status: SHIPPED | OUTPATIENT
Start: 2019-05-17 | End: 2019-06-08 | Stop reason: SDUPTHER

## 2019-06-08 DIAGNOSIS — R05.9 COUGH: ICD-10-CM

## 2019-06-10 RX ORDER — OMEPRAZOLE 40 MG/1
CAPSULE, DELAYED RELEASE ORAL
Qty: 30 CAPSULE | Refills: 11 | Status: SHIPPED | OUTPATIENT
Start: 2019-06-10 | End: 2019-06-11 | Stop reason: ALTCHOICE

## 2019-06-11 ENCOUNTER — OFFICE VISIT (OUTPATIENT)
Dept: FAMILY MEDICINE CLINIC | Age: 79
End: 2019-06-11
Payer: MEDICARE

## 2019-06-11 VITALS
DIASTOLIC BLOOD PRESSURE: 62 MMHG | HEIGHT: 64 IN | TEMPERATURE: 98.5 F | SYSTOLIC BLOOD PRESSURE: 130 MMHG | WEIGHT: 144 LBS | HEART RATE: 60 BPM | OXYGEN SATURATION: 98 % | BODY MASS INDEX: 24.59 KG/M2

## 2019-06-11 DIAGNOSIS — E03.8 OTHER SPECIFIED HYPOTHYROIDISM: ICD-10-CM

## 2019-06-11 DIAGNOSIS — J02.9 SORE THROAT: ICD-10-CM

## 2019-06-11 DIAGNOSIS — R05.9 COUGH: Primary | ICD-10-CM

## 2019-06-11 DIAGNOSIS — J34.89 NASAL DRAINAGE: ICD-10-CM

## 2019-06-11 PROCEDURE — 1090F PRES/ABSN URINE INCON ASSESS: CPT | Performed by: NURSE PRACTITIONER

## 2019-06-11 PROCEDURE — 1123F ACP DISCUSS/DSCN MKR DOCD: CPT | Performed by: NURSE PRACTITIONER

## 2019-06-11 PROCEDURE — 1036F TOBACCO NON-USER: CPT | Performed by: NURSE PRACTITIONER

## 2019-06-11 PROCEDURE — 36415 COLL VENOUS BLD VENIPUNCTURE: CPT | Performed by: NURSE PRACTITIONER

## 2019-06-11 PROCEDURE — 99214 OFFICE O/P EST MOD 30 MIN: CPT | Performed by: NURSE PRACTITIONER

## 2019-06-11 PROCEDURE — G8427 DOCREV CUR MEDS BY ELIG CLIN: HCPCS | Performed by: NURSE PRACTITIONER

## 2019-06-11 PROCEDURE — G8417 CALC BMI ABV UP PARAM F/U: HCPCS | Performed by: NURSE PRACTITIONER

## 2019-06-11 PROCEDURE — G8399 PT W/DXA RESULTS DOCUMENT: HCPCS | Performed by: NURSE PRACTITIONER

## 2019-06-11 PROCEDURE — 4040F PNEUMOC VAC/ADMIN/RCVD: CPT | Performed by: NURSE PRACTITIONER

## 2019-06-11 RX ORDER — FLUTICASONE PROPIONATE 50 MCG
1 SPRAY, SUSPENSION (ML) NASAL DAILY
Qty: 1 BOTTLE | Refills: 0 | Status: SHIPPED | OUTPATIENT
Start: 2019-06-11 | End: 2019-08-17 | Stop reason: SDUPTHER

## 2019-06-11 ASSESSMENT — ENCOUNTER SYMPTOMS
COUGH: 1
SORE THROAT: 1
TROUBLE SWALLOWING: 0
STRIDOR: 0
ALLERGIC/IMMUNOLOGIC NEGATIVE: 1
VOICE CHANGE: 0
SINUS PRESSURE: 0
SINUS PAIN: 0
WHEEZING: 0
SHORTNESS OF BREATH: 0
FACIAL SWELLING: 0
RHINORRHEA: 1
CHOKING: 0
APNEA: 0
CHEST TIGHTNESS: 0

## 2019-06-11 NOTE — PROGRESS NOTES
Flonase for 4 days. She was also given Azithromycin without any improvement in her symptoms. Past Medical History:   Diagnosis Date    Closed nondisplaced fracture of distal phalanx of lesser toe of left foot 2017    DDD (degenerative disc disease)     Grave's disease     Hyperlipidemia     Lichen planus     Overactive bladder     Psoriasis     Radiculopathy       Past Surgical History:   Procedure Laterality Date    CYST REMOVAL      from right forearm 2016    EYE SURGERY      HYSTERECTOMY         Family History   Problem Relation Age of Onset    Heart Disease Mother     Heart Disease Father        Social History     Tobacco Use    Smoking status: Former Smoker     Packs/day: 0.15     Years: 5.00     Pack years: 0.75     Last attempt to quit: 1998     Years since quittin.9    Smokeless tobacco: Never Used   Substance Use Topics    Alcohol use: Yes         Current Outpatient Medications   Medication Sig Dispense Refill    fluticasone (FLONASE) 50 MCG/ACT nasal spray 1 spray by Nasal route daily 1 Bottle 0    levothyroxine (SYNTHROID) 75 MCG tablet Take 1 tablet by mouth daily 90 tablet 1    ketoconazole (NIZORAL) 2 % shampoo Apply topically daily as needed. 120 mL 11    fluocinonide (LIDEX) 0.05 % external solution Apply sparingly to scalp qd prn. 60 mL 5    atorvastatin (LIPITOR) 10 MG tablet TAKE 1 TABLET DAILY 90 tablet 3    escitalopram (LEXAPRO) 10 MG tablet Take 1 tablet by mouth daily 90 tablet 1    Misc Natural Products (OSTEO BI-FLEX JOINT SHIELD PO) Take by mouth + VITAMIN D      ibandronate (BONIVA) 150 MG tablet Take 150 mg by mouth every 30 days Take 1 tablet every 30 days.  tolterodine (DETROL LA) 4 MG ER capsule Take 4 mg by mouth daily.  timolol (BETIMOL) 0.5 % ophthalmic solution 1 drop 2 times daily.  Apremilast (OTEZLA) 30 MG TABS Take by mouth       No current facility-administered medications for this visit.       Allergies   Allergen Reactions    Bacitracin        Subjective:      Review of Systems   Constitutional: Positive for fatigue. Negative for activity change, appetite change, chills, diaphoresis, fever and unexpected weight change. HENT: Positive for postnasal drip, rhinorrhea, sneezing and sore throat. Negative for congestion, dental problem, drooling, ear discharge, ear pain, facial swelling, hearing loss, mouth sores, nosebleeds, sinus pressure, sinus pain, tinnitus, trouble swallowing and voice change. Respiratory: Positive for cough. Negative for apnea, choking, chest tightness, shortness of breath, wheezing and stridor. Cardiovascular: Negative. Skin: Negative. Allergic/Immunologic: Negative. Neurological: Negative. Psychiatric/Behavioral: Negative. Objective:     Vitals:    06/11/19 1524   BP: 130/62   Site: Right Upper Arm   Position: Sitting   Cuff Size: Medium Adult   Pulse: 60   Temp: 98.5 °F (36.9 °C)   TempSrc: Oral   SpO2: 98%   Weight: 144 lb (65.3 kg)   Height: 5' 3.5\" (1.613 m)     Wt Readings from Last 3 Encounters:   06/11/19 144 lb (65.3 kg)   05/08/19 148 lb (67.1 kg)   03/12/19 145 lb 6.4 oz (66 kg)     Temp Readings from Last 3 Encounters:   06/11/19 98.5 °F (36.9 °C) (Oral)   05/08/19 98 °F (36.7 °C) (Oral)   02/09/18 97.2 °F (36.2 °C) (Temporal)     BP Readings from Last 3 Encounters:   06/11/19 130/62   05/08/19 128/62   03/12/19 124/78     Pulse Readings from Last 3 Encounters:   06/11/19 60   05/08/19 81   03/12/19 77     Physical Exam   Constitutional: She is oriented to person, place, and time. She appears well-developed and well-nourished. No distress. HENT:   Head: Normocephalic and atraumatic. Right Ear: External ear normal. A middle ear effusion is present. Left Ear: Tympanic membrane and external ear normal.   Nose: Mucosal edema and rhinorrhea present. Right sinus exhibits no maxillary sinus tenderness and no frontal sinus tenderness.  Left sinus exhibits no maxillary sinus tenderness and no frontal sinus tenderness. Mouth/Throat: Posterior oropharyngeal erythema (Postnasal drip noted) present. No oropharyngeal exudate. Eyes: Conjunctivae and EOM are normal. Right eye exhibits no discharge. Left eye exhibits no discharge. No scleral icterus. Neck: Normal range of motion. Neck supple. No tracheal deviation present. Cardiovascular: Normal rate, regular rhythm and intact distal pulses. Exam reveals no gallop and no friction rub. Murmur (Intermittent ) heard. Diastolic murmur is present with a grade of 2/6. Pulmonary/Chest: Effort normal and breath sounds normal. No stridor. No respiratory distress. She has no wheezes. She has no rales. She exhibits no tenderness. Abdominal: Soft. Bowel sounds are normal. She exhibits no distension and no mass. There is no tenderness. There is no rebound and no guarding. No hernia. Musculoskeletal: Normal range of motion. She exhibits no edema, tenderness or deformity. Lymphadenopathy:     She has no cervical adenopathy. Neurological: She is alert and oriented to person, place, and time. She has normal reflexes. She displays normal reflexes. No cranial nerve deficit. She exhibits normal muscle tone. Coordination normal.   Skin: Skin is warm and dry. Capillary refill takes less than 2 seconds. No rash noted. She is not diaphoretic. No erythema. No pallor. Psychiatric: She has a normal mood and affect. Her behavior is normal. Judgment and thought content normal.   Nursing note and vitals reviewed. Hospital Outpatient Visit on 05/17/2019   Component Date Value Ref Range Status    Left Ventricular Ejection Fraction 05/17/2019 58   Final-Edited    LVEF MODALITY 05/17/2019 ECHO   Final-Edited           Assessment & Plan: The following diagnoses and conditions are stable with no further orders unless indicated:  1. Cough    2. Nasal drainage    3. Sore throat    4.  Other specified hypothyroidism        Kristine Brooks was seen

## 2019-06-11 NOTE — PATIENT INSTRUCTIONS
Patient Education     Take Flonase in each nostril two times daily for the next 2 weeks. Take Zyrtec (generic is okay) daily for the next two weeks. If not any better, recommend getting a CT of your lungs. Call [phone] to schedule the lung test.      Chronic Cough: Care Instructions  Your Care Instructions    A cough is your body's response to something that bothers your throat or airways. Many things can cause a cough. You might cough because of a cold or the flu, bronchitis, or asthma. Smoking, postnasal drip, allergies, and stomach acid that backs up into your throat also can cause a cough. A cough can be short-term (acute) or long-term (chronic). A chronic cough lasts more than 3 weeks. A chronic cough is often caused by a long-term problem, such as asthma. Another cause might be a medicine, such as an ACE inhibitor. A cough is a symptom, not a disease. To treat a chronic cough, you may need to treat the problem that causes it. You can take a few steps at home to cough less and feel better. Some people cough or clear their throat out of habit for no clear reason. Follow-up care is a key part of your treatment and safety. Be sure to make and go to all appointments, and call your doctor if you are having problems. It's also a good idea to know your test results and keep a list of the medicines you take. How can you care for yourself at home? · Drink plenty of water and other fluids. This may help soothe a dry or sore throat. Honey or lemon juice in hot water or tea may ease a dry cough. · Prop up your head on pillows to help you breathe and ease a cough. · Do not smoke or allow others to smoke around you. Smoke can make a cough worse. If you need help quitting, talk to your doctor about stop-smoking programs and medicines. These can increase your chances of quitting for good. · Avoid exposure to smoke, dust, or other pollutants, or wear a face mask.  Check with your doctor or pharmacist to find out which type of face mask will give you the most benefit. · Take cough medicine as directed by your doctor. · Try cough drops to soothe a dry or sore throat. Cough drops don't stop a cough. Medicine-flavored cough drops are no better than candy-flavored drops or hard candy. Throat clearing  When you have a chronic cough or a disease that may cause this type of cough, you may often feel like you want to clear your throat. This helps bring up mucus. But throat clearing does not always have a cause. Throat clearing can become a habit. The more you do it, the more you feel like you need to do it. But frequent throat clearing can be hard on your vocal cords. It's like slamming them together. To help lessen throat clearing, you can try:  · Taking small sips of water. · Not clearing your throat when you feel you need to. · Swallowing hard when you want to clear your throat. You may want to ask your doctor if a medicine that thins mucus would help. When should you call for help? Call 911 anytime you think you may need emergency care. For example, call if:    · You have severe trouble breathing.    Call your doctor now or seek immediate medical care if:    · You cough up blood.     · You have new or worse trouble breathing.     · You have a new or higher fever.    Watch closely for changes in your health, and be sure to contact your doctor if:    · You cough more deeply or more often, especially if you notice more mucus or a change in the color of your mucus.     · You do not get better as expected. Where can you learn more? Go to https://[employer]. org and sign in to your [employer] account. Enter G648 in the [employer] box to learn more about \"Chronic Cough: Care Instructions. \"     If you do not have an account, please click on the \"Sign Up Now\" link. Current as of: September 5, 2018  Content Version: 12.0  © 2212-3946 Healthwise, Incorporated.  Care instructions adapted under license by [facility] ([facility]). If you have questions about a medical condition or this instruction, always ask your healthcare professional. [place] [address] any warranty or liability for your use of this information.

## 2019-06-12 LAB
T3 FREE: 2.2 PG/ML (ref 2.3–4.2)
T4 FREE: 1.5 NG/DL (ref 0.9–1.8)
TSH SERPL DL<=0.05 MIU/L-ACNC: 0.62 UIU/ML (ref 0.27–4.2)

## 2019-06-14 DIAGNOSIS — E03.9 HYPOTHYROIDISM, UNSPECIFIED TYPE: Primary | ICD-10-CM

## 2019-06-14 RX ORDER — LEVOTHYROXINE SODIUM 0.05 MG/1
50 TABLET ORAL DAILY
Qty: 30 TABLET | Refills: 1 | Status: SHIPPED | OUTPATIENT
Start: 2019-06-14 | End: 2019-07-06 | Stop reason: SDUPTHER

## 2019-07-02 ENCOUNTER — OFFICE VISIT (OUTPATIENT)
Dept: DERMATOLOGY | Age: 79
End: 2019-07-02
Payer: MEDICARE

## 2019-07-02 DIAGNOSIS — L40.9 PSORIASIS: Primary | ICD-10-CM

## 2019-07-02 PROCEDURE — 99213 OFFICE O/P EST LOW 20 MIN: CPT | Performed by: DERMATOLOGY

## 2019-07-16 ENCOUNTER — APPOINTMENT (OUTPATIENT)
Dept: GENERAL RADIOLOGY | Age: 79
End: 2019-07-16
Payer: MEDICARE

## 2019-07-16 ENCOUNTER — HOSPITAL ENCOUNTER (EMERGENCY)
Age: 79
Discharge: HOME | End: 2019-07-16
Attending: EMERGENCY MEDICINE
Payer: MEDICARE

## 2019-07-16 VITALS
TEMPERATURE: 97.8 F | HEART RATE: 70 BPM | WEIGHT: 145 LBS | BODY MASS INDEX: 24.75 KG/M2 | RESPIRATION RATE: 17 BRPM | DIASTOLIC BLOOD PRESSURE: 65 MMHG | OXYGEN SATURATION: 100 % | SYSTOLIC BLOOD PRESSURE: 156 MMHG | HEIGHT: 64 IN

## 2019-07-16 DIAGNOSIS — S51.012A SKIN TEAR OF LEFT ELBOW WITHOUT COMPLICATION, INITIAL ENCOUNTER: ICD-10-CM

## 2019-07-16 DIAGNOSIS — S22.32XA CLOSED FRACTURE OF ONE RIB OF LEFT SIDE, INITIAL ENCOUNTER: Primary | ICD-10-CM

## 2019-07-16 PROCEDURE — 71101 X-RAY EXAM UNILAT RIBS/CHEST: CPT

## 2019-07-16 PROCEDURE — 6370000000 HC RX 637 (ALT 250 FOR IP): Performed by: EMERGENCY MEDICINE

## 2019-07-16 PROCEDURE — 99283 EMERGENCY DEPT VISIT LOW MDM: CPT

## 2019-07-16 RX ORDER — NAPROXEN 500 MG/1
500 TABLET ORAL 2 TIMES DAILY
Qty: 20 TABLET | Refills: 0 | Status: SHIPPED | OUTPATIENT
Start: 2019-07-16 | End: 2019-09-04

## 2019-07-16 RX ORDER — TRAMADOL HYDROCHLORIDE 50 MG/1
50 TABLET ORAL ONCE
Status: COMPLETED | OUTPATIENT
Start: 2019-07-16 | End: 2019-07-16

## 2019-07-16 RX ORDER — TRAMADOL HYDROCHLORIDE 50 MG/1
50 TABLET ORAL EVERY 6 HOURS PRN
Qty: 12 TABLET | Refills: 0 | Status: SHIPPED | OUTPATIENT
Start: 2019-07-16 | End: 2019-07-19

## 2019-07-16 RX ADMIN — TRAMADOL HYDROCHLORIDE 50 MG: 50 TABLET, FILM COATED ORAL at 20:12

## 2019-07-16 ASSESSMENT — PAIN SCALES - GENERAL
PAINLEVEL_OUTOF10: 4
PAINLEVEL_OUTOF10: 4

## 2019-07-16 ASSESSMENT — PAIN DESCRIPTION - ORIENTATION: ORIENTATION: LEFT

## 2019-07-16 ASSESSMENT — PAIN DESCRIPTION - LOCATION: LOCATION: RIB CAGE

## 2019-07-16 ASSESSMENT — PAIN DESCRIPTION - FREQUENCY: FREQUENCY: INTERMITTENT

## 2019-07-16 ASSESSMENT — PAIN DESCRIPTION - PAIN TYPE: TYPE: ACUTE PAIN

## 2019-07-17 NOTE — ED PROVIDER NOTES
Emergency Physician Note    Chief Complaint  Fall       History of Present Illness  Pranav Coffman is a 66 y.o. female who presents to the ED for fall and chest pain. Patient reports that she experienced a mechanical fall this evening. Patient reports that she was going up her porch when she turned to her left side and felt as if she was falling. Patient did fall onto her left side but did not hit her head or lose consciousness. Patient is been able to stand and ablate since the injury. Patient reports pain along her left lateral inferior chest wall worsened with deep inspiration as well as coughing. Patient does not report any shortness of breath. No reported neck pain, back pain, chest pain, shortness of breath, abdominal pain, nausea or vomiting, diarrhea, constipation, melena, hematochezia, dysuria, hematuria. No reported lightheadedness or dizziness. Patient also sustained a skin tear to the left proximal forearm/elbow which she thoroughly cleaned and dressed at home. Patient reports her tetanus shot was within the last 5 years. Patient felt otherwise well prior to onset of current symptoms. 10 systems reviewed, pertinent positives per HPI otherwise noted to be negative    I have reviewed the following from the nursing documentation:      Prior to Admission medications    Medication Sig Start Date End Date Taking? Authorizing Provider   naproxen (NAPROSYN) 500 MG tablet Take 1 tablet by mouth 2 times daily for 10 days 7/16/19 7/26/19 Yes Dom Reagan MD   traMADol (ULTRAM) 50 MG tablet Take 1 tablet by mouth every 6 hours as needed for Pain (breakthrough pain) for up to 3 days.  7/16/19 7/19/19 Yes Dom Reagan MD   levothyroxine (SYNTHROID) 50 MCG tablet TAKE 1 TABLET BY MOUTH EVERY DAY 7/8/19   JYOTI Edge CNP   fluticasone Fozia Curie) 50 MCG/ACT nasal spray 1 spray by Nasal route daily 6/11/19   JYOTI Edge CNP   ketoconazole (NIZORAL) 2 % shampoo Apply topically daily as needed. 2/27/19   Ivett Ryan MD   fluocinonide (LIDEX) 0.05 % external solution Apply sparingly to scalp qd prn. 1/15/19   Ivett Ryan MD   atorvastatin (LIPITOR) 10 MG tablet TAKE 1 TABLET DAILY 1/4/19   Teo Servin MD   escitalopram (LEXAPRO) 10 MG tablet Take 1 tablet by mouth daily 12/20/18 6/11/19  Charity Cuellar, APRN - CNP   Misc Natural Products (OSTEO BI-FLEX JOINT SHIELD PO) Take by mouth + VITAMIN D    Historical Provider, MD   ibandronate (BONIVA) 150 MG tablet Take 150 mg by mouth every 30 days Take 1 tablet every 30 days. Historical Provider, MD   tolterodine (DETROL LA) 4 MG ER capsule Take 4 mg by mouth daily. Historical Provider, MD   timolol (BETIMOL) 0.5 % ophthalmic solution 1 drop 2 times daily.     Historical Provider, MD       Allergies as of 07/16/2019 - Review Complete 07/16/2019   Allergen Reaction Noted    Bacitracin  11/01/2013       Past Medical History:   Diagnosis Date    Closed nondisplaced fracture of distal phalanx of lesser toe of left foot 6/14/2017    DDD (degenerative disc disease)     Grave's disease     Hyperlipidemia     Lichen planus     Overactive bladder     Psoriasis     Radiculopathy         Surgical History:   Past Surgical History:   Procedure Laterality Date    CYST REMOVAL      from right forearm 9/2016    EYE SURGERY      HYSTERECTOMY          Family History:    Family History   Problem Relation Age of Onset    Heart Disease Mother     Heart Disease Father        Social History     Socioeconomic History    Marital status:      Spouse name: Not on file    Number of children: Not on file    Years of education: Not on file    Highest education level: Not on file   Occupational History    Not on file   Social Needs    Financial resource strain: Not on file    Food insecurity:     Worry: Not on file     Inability: Not on file    Transportation needs:     Medical: Not on (breakthrough pain) for up to 3 days. Disposition  Pt is in stable condition upon Discharge to home. This chart was generated using the [address] [place] [address] dictation system. I created this record but it may contain dictation errors.            Dewey Clifford MD  07/16/19 4003

## 2019-07-17 NOTE — ED NOTES
PT STATES PAIN IS BETTER. STATES THE MEDICINE MADE HER SLEEPY AND SHE IS WORRIED ABOUT THE SKIN TEAR. WILL WRAP ARM FOR PT COMFORT.      Erick Browne RN  07/16/19 9629

## 2019-07-17 NOTE — ED NOTES
PT MEDICATED AS ORDERED. DENIES ANY OTHER NEEDS. OFFERED WARM BLANKET AND PT DECLINED. BED RAIL UP X1,  SITTING AT BEDSIDE.      Pedro Mars RN  07/16/19 2017

## 2019-08-17 DIAGNOSIS — J34.89 NASAL DRAINAGE: ICD-10-CM

## 2019-08-17 DIAGNOSIS — R05.9 COUGH: ICD-10-CM

## 2019-08-17 DIAGNOSIS — J02.9 SORE THROAT: ICD-10-CM

## 2019-08-19 ENCOUNTER — TELEPHONE (OUTPATIENT)
Dept: DERMATOLOGY | Age: 79
End: 2019-08-19

## 2019-08-19 RX ORDER — FLUTICASONE PROPIONATE 50 MCG
SPRAY, SUSPENSION (ML) NASAL
Qty: 12 G | Refills: 3 | Status: SHIPPED | OUTPATIENT
Start: 2019-08-19 | End: 2019-09-04

## 2019-08-19 NOTE — TELEPHONE ENCOUNTER
Returned call to patient. Patient has not been informed of light therapy. refaxed order to [facility] (scanned into media)   Patient wants to know what to do for flares, while waiting for light therapy approval.   Per Dr. Jeanne Steen patient can try topicals, but we will have to wait for [facility] scalp treatment approval for additional treatment.      Patient informed, and expressed understanding

## 2019-08-21 NOTE — TELEPHONE ENCOUNTER
Contacted [employer] today,   Order was received, and will be assigned to a Rep soon, and Rep will process benefits.

## 2019-08-29 ENCOUNTER — TELEPHONE (OUTPATIENT)
Dept: FAMILY MEDICINE CLINIC | Age: 79
End: 2019-08-29

## 2019-09-10 ENCOUNTER — OFFICE VISIT (OUTPATIENT)
Dept: FAMILY MEDICINE CLINIC | Age: 79
End: 2019-09-10
Payer: MEDICARE

## 2019-09-10 VITALS
BODY MASS INDEX: 25.03 KG/M2 | HEART RATE: 55 BPM | SYSTOLIC BLOOD PRESSURE: 160 MMHG | DIASTOLIC BLOOD PRESSURE: 74 MMHG | WEIGHT: 145.8 LBS | OXYGEN SATURATION: 99 %

## 2019-09-10 DIAGNOSIS — F41.8 SITUATIONAL ANXIETY: ICD-10-CM

## 2019-09-10 DIAGNOSIS — E78.2 MIXED HYPERLIPIDEMIA: ICD-10-CM

## 2019-09-10 DIAGNOSIS — E05.00 GRAVES DISEASE: Primary | ICD-10-CM

## 2019-09-10 DIAGNOSIS — S22.32XD CLOSED FRACTURE OF ONE RIB OF LEFT SIDE WITH ROUTINE HEALING: ICD-10-CM

## 2019-09-10 DIAGNOSIS — L40.9 PSORIASIS: ICD-10-CM

## 2019-09-10 DIAGNOSIS — F32.9 REACTIVE DEPRESSION: ICD-10-CM

## 2019-09-10 LAB
T3 FREE: 2.3 PG/ML (ref 2.3–4.2)
T4 FREE: 1.4 NG/DL (ref 0.9–1.8)
TSH SERPL DL<=0.05 MIU/L-ACNC: 4.57 UIU/ML (ref 0.27–4.2)

## 2019-09-10 PROCEDURE — 36415 COLL VENOUS BLD VENIPUNCTURE: CPT | Performed by: FAMILY MEDICINE

## 2019-09-10 PROCEDURE — G8427 DOCREV CUR MEDS BY ELIG CLIN: HCPCS | Performed by: FAMILY MEDICINE

## 2019-09-10 PROCEDURE — 3288F FALL RISK ASSESSMENT DOCD: CPT | Performed by: FAMILY MEDICINE

## 2019-09-10 PROCEDURE — 4040F PNEUMOC VAC/ADMIN/RCVD: CPT | Performed by: FAMILY MEDICINE

## 2019-09-10 PROCEDURE — 1036F TOBACCO NON-USER: CPT | Performed by: FAMILY MEDICINE

## 2019-09-10 PROCEDURE — 1090F PRES/ABSN URINE INCON ASSESS: CPT | Performed by: FAMILY MEDICINE

## 2019-09-10 PROCEDURE — 99214 OFFICE O/P EST MOD 30 MIN: CPT | Performed by: FAMILY MEDICINE

## 2019-09-10 PROCEDURE — G8399 PT W/DXA RESULTS DOCUMENT: HCPCS | Performed by: FAMILY MEDICINE

## 2019-09-10 PROCEDURE — G8417 CALC BMI ABV UP PARAM F/U: HCPCS | Performed by: FAMILY MEDICINE

## 2019-09-10 PROCEDURE — 1123F ACP DISCUSS/DSCN MKR DOCD: CPT | Performed by: FAMILY MEDICINE

## 2019-09-10 RX ORDER — ESCITALOPRAM OXALATE 10 MG/1
10 TABLET ORAL DAILY
Qty: 90 TABLET | Refills: 3 | Status: SHIPPED | OUTPATIENT
Start: 2019-09-10 | End: 2019-11-20 | Stop reason: SDUPTHER

## 2019-09-12 ENCOUNTER — TELEPHONE (OUTPATIENT)
Dept: DERMATOLOGY | Age: 79
End: 2019-09-12

## 2019-09-12 DIAGNOSIS — L40.9 PSORIASIS: Primary | ICD-10-CM

## 2019-10-01 ENCOUNTER — OFFICE VISIT (OUTPATIENT)
Dept: DERMATOLOGY | Age: 79
End: 2019-10-01
Payer: MEDICARE

## 2019-10-01 DIAGNOSIS — D48.5 NEOPLASM OF UNCERTAIN BEHAVIOR OF SKIN: ICD-10-CM

## 2019-10-01 DIAGNOSIS — L21.9 SEBORRHEA: ICD-10-CM

## 2019-10-01 DIAGNOSIS — L40.9 PSORIASIS: Primary | ICD-10-CM

## 2019-10-01 PROCEDURE — G8427 DOCREV CUR MEDS BY ELIG CLIN: HCPCS | Performed by: DERMATOLOGY

## 2019-10-01 PROCEDURE — 99213 OFFICE O/P EST LOW 20 MIN: CPT | Performed by: DERMATOLOGY

## 2019-10-01 PROCEDURE — 1090F PRES/ABSN URINE INCON ASSESS: CPT | Performed by: DERMATOLOGY

## 2019-10-01 PROCEDURE — 11102 TANGNTL BX SKIN SINGLE LES: CPT | Performed by: DERMATOLOGY

## 2019-10-01 PROCEDURE — G8417 CALC BMI ABV UP PARAM F/U: HCPCS | Performed by: DERMATOLOGY

## 2019-10-01 PROCEDURE — G8399 PT W/DXA RESULTS DOCUMENT: HCPCS | Performed by: DERMATOLOGY

## 2019-10-01 PROCEDURE — G8482 FLU IMMUNIZE ORDER/ADMIN: HCPCS | Performed by: DERMATOLOGY

## 2019-10-01 PROCEDURE — 4040F PNEUMOC VAC/ADMIN/RCVD: CPT | Performed by: DERMATOLOGY

## 2019-10-01 PROCEDURE — 1123F ACP DISCUSS/DSCN MKR DOCD: CPT | Performed by: DERMATOLOGY

## 2019-10-01 PROCEDURE — 1036F TOBACCO NON-USER: CPT | Performed by: DERMATOLOGY

## 2019-10-03 LAB — DERMATOLOGY PATHOLOGY REPORT: NORMAL

## 2019-10-14 ENCOUNTER — TELEPHONE (OUTPATIENT)
Dept: FAMILY MEDICINE CLINIC | Age: 79
End: 2019-10-14

## 2019-10-29 ENCOUNTER — OFFICE VISIT (OUTPATIENT)
Dept: FAMILY MEDICINE CLINIC | Age: 79
End: 2019-10-29
Payer: MEDICARE

## 2019-10-29 VITALS
RESPIRATION RATE: 14 BRPM | DIASTOLIC BLOOD PRESSURE: 70 MMHG | OXYGEN SATURATION: 94 % | HEART RATE: 64 BPM | BODY MASS INDEX: 25.47 KG/M2 | HEIGHT: 64 IN | WEIGHT: 149.2 LBS | SYSTOLIC BLOOD PRESSURE: 130 MMHG

## 2019-10-29 DIAGNOSIS — Z00.00 ROUTINE GENERAL MEDICAL EXAMINATION AT A HEALTH CARE FACILITY: Primary | ICD-10-CM

## 2019-10-29 PROCEDURE — 1123F ACP DISCUSS/DSCN MKR DOCD: CPT | Performed by: FAMILY MEDICINE

## 2019-10-29 PROCEDURE — 4040F PNEUMOC VAC/ADMIN/RCVD: CPT | Performed by: FAMILY MEDICINE

## 2019-10-29 PROCEDURE — G0439 PPPS, SUBSEQ VISIT: HCPCS | Performed by: FAMILY MEDICINE

## 2019-10-29 PROCEDURE — G8482 FLU IMMUNIZE ORDER/ADMIN: HCPCS | Performed by: FAMILY MEDICINE

## 2019-10-29 ASSESSMENT — LIFESTYLE VARIABLES
HOW OFTEN DO YOU HAVE SIX OR MORE DRINKS ON ONE OCCASION: 0
HOW OFTEN DURING THE LAST YEAR HAVE YOU NEEDED AN ALCOHOLIC DRINK FIRST THING IN THE MORNING TO GET YOURSELF GOING AFTER A NIGHT OF HEAVY DRINKING: 0
HAVE YOU OR SOMEONE ELSE BEEN INJURED AS A RESULT OF YOUR DRINKING: 0
HOW OFTEN DURING THE LAST YEAR HAVE YOU FAILED TO DO WHAT WAS NORMALLY EXPECTED FROM YOU BECAUSE OF DRINKING: 0
AUDIT TOTAL SCORE: 4
HOW OFTEN DURING THE LAST YEAR HAVE YOU BEEN UNABLE TO REMEMBER WHAT HAPPENED THE NIGHT BEFORE BECAUSE YOU HAD BEEN DRINKING: 0
HOW OFTEN DO YOU HAVE A DRINK CONTAINING ALCOHOL: 4
HAS A RELATIVE, FRIEND, DOCTOR, OR ANOTHER HEALTH PROFESSIONAL EXPRESSED CONCERN ABOUT YOUR DRINKING OR SUGGESTED YOU CUT DOWN: 0
HOW OFTEN DURING THE LAST YEAR HAVE YOU HAD A FEELING OF GUILT OR REMORSE AFTER DRINKING: 0
AUDIT-C TOTAL SCORE: 4
HOW MANY STANDARD DRINKS CONTAINING ALCOHOL DO YOU HAVE ON A TYPICAL DAY: 0
HOW OFTEN DURING THE LAST YEAR HAVE YOU FOUND THAT YOU WERE NOT ABLE TO STOP DRINKING ONCE YOU HAD STARTED: 0

## 2019-10-29 ASSESSMENT — PATIENT HEALTH QUESTIONNAIRE - PHQ9
SUM OF ALL RESPONSES TO PHQ QUESTIONS 1-9: 2
SUM OF ALL RESPONSES TO PHQ QUESTIONS 1-9: 2

## 2019-10-31 RX ORDER — CLOBETASOL PROPIONATE 0.5 MG/G
CREAM TOPICAL
Qty: 60 G | Refills: 2 | Status: SHIPPED | OUTPATIENT
Start: 2019-10-31 | End: 2021-12-09

## 2019-11-13 ENCOUNTER — OFFICE VISIT (OUTPATIENT)
Dept: DERMATOLOGY | Age: 79
End: 2019-11-13
Payer: MEDICARE

## 2019-11-13 DIAGNOSIS — L40.9 PSORIASIS: Primary | ICD-10-CM

## 2019-11-13 PROCEDURE — G8427 DOCREV CUR MEDS BY ELIG CLIN: HCPCS | Performed by: DERMATOLOGY

## 2019-11-13 PROCEDURE — 99214 OFFICE O/P EST MOD 30 MIN: CPT | Performed by: DERMATOLOGY

## 2019-11-13 PROCEDURE — G8482 FLU IMMUNIZE ORDER/ADMIN: HCPCS | Performed by: DERMATOLOGY

## 2019-11-13 PROCEDURE — 1090F PRES/ABSN URINE INCON ASSESS: CPT | Performed by: DERMATOLOGY

## 2019-11-13 PROCEDURE — 4040F PNEUMOC VAC/ADMIN/RCVD: CPT | Performed by: DERMATOLOGY

## 2019-11-13 PROCEDURE — 1036F TOBACCO NON-USER: CPT | Performed by: DERMATOLOGY

## 2019-11-13 PROCEDURE — 1123F ACP DISCUSS/DSCN MKR DOCD: CPT | Performed by: DERMATOLOGY

## 2019-11-13 PROCEDURE — G8417 CALC BMI ABV UP PARAM F/U: HCPCS | Performed by: DERMATOLOGY

## 2019-11-13 PROCEDURE — G8399 PT W/DXA RESULTS DOCUMENT: HCPCS | Performed by: DERMATOLOGY

## 2019-11-20 DIAGNOSIS — F32.9 REACTIVE DEPRESSION: ICD-10-CM

## 2019-11-20 RX ORDER — ESCITALOPRAM OXALATE 10 MG/1
TABLET ORAL
Qty: 90 TABLET | Refills: 1 | Status: SHIPPED | OUTPATIENT
Start: 2019-11-20 | End: 2020-08-19

## 2019-11-27 ENCOUNTER — OFFICE VISIT (OUTPATIENT)
Dept: DERMATOLOGY | Age: 79
End: 2019-11-27
Payer: MEDICARE

## 2019-11-27 DIAGNOSIS — L40.9 PSORIASIS: Primary | ICD-10-CM

## 2019-11-27 PROCEDURE — G8482 FLU IMMUNIZE ORDER/ADMIN: HCPCS | Performed by: DERMATOLOGY

## 2019-11-27 PROCEDURE — 4040F PNEUMOC VAC/ADMIN/RCVD: CPT | Performed by: DERMATOLOGY

## 2019-11-27 PROCEDURE — G8399 PT W/DXA RESULTS DOCUMENT: HCPCS | Performed by: DERMATOLOGY

## 2019-11-27 PROCEDURE — 1036F TOBACCO NON-USER: CPT | Performed by: DERMATOLOGY

## 2019-11-27 PROCEDURE — 99213 OFFICE O/P EST LOW 20 MIN: CPT | Performed by: DERMATOLOGY

## 2019-11-27 PROCEDURE — G8417 CALC BMI ABV UP PARAM F/U: HCPCS | Performed by: DERMATOLOGY

## 2019-11-27 PROCEDURE — G8427 DOCREV CUR MEDS BY ELIG CLIN: HCPCS | Performed by: DERMATOLOGY

## 2019-11-27 PROCEDURE — 1123F ACP DISCUSS/DSCN MKR DOCD: CPT | Performed by: DERMATOLOGY

## 2019-11-27 PROCEDURE — 1090F PRES/ABSN URINE INCON ASSESS: CPT | Performed by: DERMATOLOGY

## 2019-12-09 DIAGNOSIS — E78.2 MIXED HYPERLIPIDEMIA: ICD-10-CM

## 2019-12-09 RX ORDER — ATORVASTATIN CALCIUM 10 MG/1
TABLET, FILM COATED ORAL
Qty: 90 TABLET | Refills: 3 | Status: SHIPPED | OUTPATIENT
Start: 2019-12-09 | End: 2020-10-14 | Stop reason: SDUPTHER

## 2019-12-18 RX ORDER — LEVOTHYROXINE SODIUM 0.05 MG/1
TABLET ORAL
Qty: 90 TABLET | Refills: 1 | Status: SHIPPED | OUTPATIENT
Start: 2019-12-18 | End: 2020-06-12

## 2020-01-08 ENCOUNTER — OFFICE VISIT (OUTPATIENT)
Dept: DERMATOLOGY | Age: 80
End: 2020-01-08
Payer: MEDICARE

## 2020-01-08 PROCEDURE — 99214 OFFICE O/P EST MOD 30 MIN: CPT | Performed by: DERMATOLOGY

## 2020-01-08 PROCEDURE — 1090F PRES/ABSN URINE INCON ASSESS: CPT | Performed by: DERMATOLOGY

## 2020-01-08 PROCEDURE — G8482 FLU IMMUNIZE ORDER/ADMIN: HCPCS | Performed by: DERMATOLOGY

## 2020-01-08 PROCEDURE — G8427 DOCREV CUR MEDS BY ELIG CLIN: HCPCS | Performed by: DERMATOLOGY

## 2020-01-08 PROCEDURE — G8399 PT W/DXA RESULTS DOCUMENT: HCPCS | Performed by: DERMATOLOGY

## 2020-01-08 PROCEDURE — 1123F ACP DISCUSS/DSCN MKR DOCD: CPT | Performed by: DERMATOLOGY

## 2020-01-08 PROCEDURE — 1036F TOBACCO NON-USER: CPT | Performed by: DERMATOLOGY

## 2020-01-08 PROCEDURE — 4040F PNEUMOC VAC/ADMIN/RCVD: CPT | Performed by: DERMATOLOGY

## 2020-01-08 PROCEDURE — G8417 CALC BMI ABV UP PARAM F/U: HCPCS | Performed by: DERMATOLOGY

## 2020-01-08 NOTE — PROGRESS NOTES
[facility], [facility]) Physicians    Previous clinic visit:  11/27/19    CC: follow up psoriasis of scalp and body    HPI:    1.)  Here today for follow up with me regarding scalp psoriasis; pt is new to this practice but known to me from my  practice. Has been using lidex 0.05% soln a few times weekly, ketoconazole shampoo once weekly w/o much benefit. Only washes hair twice a week. Has used desonide cream for face and sal acid 6% shampoo to scalp but continued to flare and actually worsen. Failed enstilar foam samples. Feels scale is thicker actually. At last visit with me at [facility] I rx'd lidex soln for scalp and dovonox cream for scalp. Couldn't afford entilar foam. Hasn't improved at all. Itch is terrible. Has involvement now of torso with small guttate lesions. Started Cyndie Barakater in Jan 2019, which beautifully cleared her scalp. However, became quickly intolerant of the GI side effects, which included severe nausea, vomting, and diarrhea recalcitrant to antidiarrheal medications. Discontinued otezla in April 2019. Has had a phototherapy comb now for several months (estimated start in sept 2019). Used 3x weekly;  performs for her. Doesn't seem to be helping. Have explained to them several times MUST USE MINERAL OIL to scalp to decrease interface of scale. At last visit we cleaned up  Medication list and instructed patient to make a concerted 2 week effort of using all topicals correctly and she cleared! However, today scalp is flaring significantly again and arms, legs, and torso with several psoriatic papules. Patient is miserable.      DERM HISTORY:   Personal history of NMSC or MM- no  Family history of NMSC or MM- no  Sunburns easily- yes  Uses sunscreen- yes  History of tanning bed use- no    ADDITIONAL HISTORY:    I have reviewed past medical and surgical histories, current medications, allergies, social and family histories as documented in the patient's electronic medical record.  Relevant additional history: Relevant additional history: Seen in past by Dr. Gonzalez Lima for fingernail issues. Biopsy of the right thumbnail revealed changes consistent with both psoriasis and LP. Previously treated with Class I corticosteroid after soaking under occlusion as well as Methotrexate 12.5-mg weekly for 6-months without improvement. History of transaminase elevation on Methotrexate that returned to normal following discontinuation. Cyclosporin initially initiated at 100-mg 2-tablets twice daily on 6/29/2010 resulting in severe headaches, flushing, chills, increased facial hair, and later elevated creatinine. Symptoms persisted on a decreased dose of 100-mg twice a day. Further decreased to current dose of 50-mg twice a day but eventually d/c'd in 2011. Has not been seen since 2012; did not wish to pursue [facility]. Current Outpatient Medications   Medication Sig Dispense Refill    levothyroxine (SYNTHROID) 50 MCG tablet TAKE 1 TABLET BY MOUTH EVERY DAY 90 tablet 1    atorvastatin (LIPITOR) 10 MG tablet TAKE 1 TABLET DAILY 90 tablet 3    escitalopram (LEXAPRO) 10 MG tablet TAKE 1 TABLET BY MOUTH EVERY DAY 90 tablet 1    clobetasol (TEMOVATE) 0.05 % cream Apply to affected area twice daily 60 g 2    ketoconazole (NIZORAL) 2 % shampoo Apply topically daily as needed. 120 mL 11    Misc Natural Products (OSTEO BI-FLEX JOINT SHIELD PO) Take by mouth + VITAMIN D      ibandronate (BONIVA) 150 MG tablet Take 150 mg by mouth every 30 days Take 1 tablet every 30 days.  tolterodine (DETROL LA) 4 MG ER capsule Take 4 mg by mouth daily.  timolol (BETIMOL) 0.5 % ophthalmic solution 1 drop 2 times daily. No current facility-administered medications for this visit.         ROS:    General: No fevers, chills, sweats, unexplained weight loss or weight gain, fatigue, malaise   Skin: Denies additional lesions    Heme: No history of bleeding diatheses    Allergy: Denies seasonal or environmental allergies or other medication allergies     PHYSICAL EXAM:    General: Well-appearing, NAD      Integument: Examination was performed of the following and were unremarkable except as otherwise noted below:psych/neuro, scalp, hair, face, ears, conjunctivae/eyelids, gums/teeth/lips, buccal mucosa, oropharynx, neck, chest    Abnormalities noted include:      1.) Scalp, arms, legs, upper torso with numerous few mm to several cm indurated scaly erythematous psoriatic papules and plaques     ASSESSMENT AND PLAN:    1.)  Psoriasis and sebopsoriasis of scalp, previously markedly improved on otezla but intolerant to side effects and hence discontinued. Disease  has proven recalcitrant to multiple topicals to date and is failing to improve on phototherapy comb. Patient and I are both reluctant to go to biologic medication given her age and multiple co-morbidities; would like to try otezla again:  - Given patient's age and co-morbidties, she is not a good candidate for DMARDs. - Will restart otezla but at a modified dosing protocol with a protracted loading phase course and maintenance dosing of initially 30 mg daily to hopefully preepempt the GI side effects. Will be sent with starter pack. Edu: GI upset, diarrhea, SI, weight loss, dyspepsia, headache, nasopharyngitis   - Discussed tx with immodium and metamucil as well as other bulk-forming agents and bismuth subsalicylate. We can try an off-label approach, decreasing the apremilast dose to once a day. If the patient gets relief on that dosage, can increase it to 30 mg two times, every other day; then, to the recommended and approved 30 mg, twice daily.    - Cont photherapy comb at [facility]; suggest also mineral oil prior to tx   - Continue keto shampoo but urged to wash hair a few times weekly AT LEAST  - desonide cream to face  - 2% sal acid creams provided today by [employer]  - keto cream to face daily  - lexette foam samples daily to scalp  - clobetasol to body bid prn      Return to Clinic: 3 mo  Discussed plan with patient and/or primary caretaker. Patient to call clinic with any questions / concerns. Reviewed side effects of treatment(s) and/or medication(s) with patient and/or primary caretaker.    AVS provided or is available on [facility]   ____________________________________________________________________________   Aury Mckeon MD, MPH, [facility]  [facility], Jenna Collins

## 2020-01-15 ENCOUNTER — TELEPHONE (OUTPATIENT)
Dept: DERMATOLOGY | Age: 80
End: 2020-01-15

## 2020-01-15 NOTE — TELEPHONE ENCOUNTER
Patient is on Day 7 of titration dosage,   Patient states she had diarrhea last night, and throughout today   Patient instructed to start imodium and metamucil per Dr. Aidee Ashton advice in last ov note. Patient informed PA has been approved for [facility], patient states she received letter of approval.   Waiting for receipt of [facility]. Will check patient status update next week.

## 2020-01-21 NOTE — TELEPHONE ENCOUNTER
Patient has not yet received medication from [address] [facility]   Patient is using antidiarrheal, and has not been taking metamucil, patient states she is a little constipated.    Will increase water intake, patient will decrease frequency of imodium daily dose,     Will check on patient in one week for status update (patient requests a phone call on Wednesday January 29, 2020)

## 2020-01-30 NOTE — TELEPHONE ENCOUNTER
Spoke with Nargis Mills via mobile phone number   Patient states she is using Cerave psoriasis lotion on scalp,  Scheduled her for 3 months follow up in [address] says she will receive maintenance dose Monday 02/03/2020 per Reyna.

## 2020-03-10 ENCOUNTER — OFFICE VISIT (OUTPATIENT)
Dept: FAMILY MEDICINE CLINIC | Age: 80
End: 2020-03-10
Payer: MEDICARE

## 2020-03-10 VITALS
OXYGEN SATURATION: 99 % | WEIGHT: 147 LBS | DIASTOLIC BLOOD PRESSURE: 76 MMHG | HEART RATE: 51 BPM | HEIGHT: 64 IN | SYSTOLIC BLOOD PRESSURE: 144 MMHG | BODY MASS INDEX: 25.1 KG/M2

## 2020-03-10 PROBLEM — L85.8 KERATOACANTHOMA: Status: ACTIVE | Noted: 2020-03-10

## 2020-03-10 PROBLEM — G31.84 MILD COGNITIVE IMPAIRMENT: Status: ACTIVE | Noted: 2020-03-10

## 2020-03-10 LAB
A/G RATIO: 1.9 (ref 1.1–2.2)
ALBUMIN SERPL-MCNC: 4.2 G/DL (ref 3.4–5)
ALP BLD-CCNC: 114 U/L (ref 40–129)
ALT SERPL-CCNC: 8 U/L (ref 10–40)
ANION GAP SERPL CALCULATED.3IONS-SCNC: 11 MMOL/L (ref 3–16)
AST SERPL-CCNC: 16 U/L (ref 15–37)
BASOPHILS ABSOLUTE: 0.1 K/UL (ref 0–0.2)
BASOPHILS RELATIVE PERCENT: 1.1 %
BILIRUB SERPL-MCNC: 0.4 MG/DL (ref 0–1)
BUN BLDV-MCNC: 13 MG/DL (ref 7–20)
CALCIUM SERPL-MCNC: 9.5 MG/DL (ref 8.3–10.6)
CHLORIDE BLD-SCNC: 105 MMOL/L (ref 99–110)
CHOLESTEROL, TOTAL: 150 MG/DL (ref 0–199)
CO2: 26 MMOL/L (ref 21–32)
CREAT SERPL-MCNC: 1 MG/DL (ref 0.6–1.2)
EOSINOPHILS ABSOLUTE: 0.1 K/UL (ref 0–0.6)
EOSINOPHILS RELATIVE PERCENT: 0.9 %
GFR AFRICAN AMERICAN: >60
GFR NON-AFRICAN AMERICAN: 53
GLOBULIN: 2.2 G/DL
GLUCOSE BLD-MCNC: 81 MG/DL (ref 70–99)
HCT VFR BLD CALC: 42.1 % (ref 36–48)
HDLC SERPL-MCNC: 85 MG/DL (ref 40–60)
HEMOGLOBIN: 13.6 G/DL (ref 12–16)
LDL CHOLESTEROL CALCULATED: 49 MG/DL
LYMPHOCYTES ABSOLUTE: 1.1 K/UL (ref 1–5.1)
LYMPHOCYTES RELATIVE PERCENT: 16.9 %
MAGNESIUM: 2.5 MG/DL (ref 1.8–2.4)
MCH RBC QN AUTO: 29.8 PG (ref 26–34)
MCHC RBC AUTO-ENTMCNC: 32.2 G/DL (ref 31–36)
MCV RBC AUTO: 92.5 FL (ref 80–100)
MONOCYTES ABSOLUTE: 0.6 K/UL (ref 0–1.3)
MONOCYTES RELATIVE PERCENT: 8.9 %
NEUTROPHILS ABSOLUTE: 4.8 K/UL (ref 1.7–7.7)
NEUTROPHILS RELATIVE PERCENT: 72.2 %
PDW BLD-RTO: 13.7 % (ref 12.4–15.4)
PLATELET # BLD: 247 K/UL (ref 135–450)
PMV BLD AUTO: 9.3 FL (ref 5–10.5)
POTASSIUM SERPL-SCNC: 5 MMOL/L (ref 3.5–5.1)
RBC # BLD: 4.55 M/UL (ref 4–5.2)
SODIUM BLD-SCNC: 142 MMOL/L (ref 136–145)
T3 FREE: 2.5 PG/ML (ref 2.3–4.2)
T4 FREE: 1.4 NG/DL (ref 0.9–1.8)
TOTAL PROTEIN: 6.4 G/DL (ref 6.4–8.2)
TRIGL SERPL-MCNC: 81 MG/DL (ref 0–150)
TSH SERPL DL<=0.05 MIU/L-ACNC: 3.73 UIU/ML (ref 0.27–4.2)
VITAMIN D 25-HYDROXY: 36.8 NG/ML
VLDLC SERPL CALC-MCNC: 16 MG/DL
WBC # BLD: 6.7 K/UL (ref 4–11)

## 2020-03-10 PROCEDURE — 36415 COLL VENOUS BLD VENIPUNCTURE: CPT | Performed by: FAMILY MEDICINE

## 2020-03-10 PROCEDURE — 1123F ACP DISCUSS/DSCN MKR DOCD: CPT | Performed by: FAMILY MEDICINE

## 2020-03-10 PROCEDURE — G8417 CALC BMI ABV UP PARAM F/U: HCPCS | Performed by: FAMILY MEDICINE

## 2020-03-10 PROCEDURE — 1036F TOBACCO NON-USER: CPT | Performed by: FAMILY MEDICINE

## 2020-03-10 PROCEDURE — G8399 PT W/DXA RESULTS DOCUMENT: HCPCS | Performed by: FAMILY MEDICINE

## 2020-03-10 PROCEDURE — G8427 DOCREV CUR MEDS BY ELIG CLIN: HCPCS | Performed by: FAMILY MEDICINE

## 2020-03-10 PROCEDURE — 99214 OFFICE O/P EST MOD 30 MIN: CPT | Performed by: FAMILY MEDICINE

## 2020-03-10 PROCEDURE — G8482 FLU IMMUNIZE ORDER/ADMIN: HCPCS | Performed by: FAMILY MEDICINE

## 2020-03-10 PROCEDURE — 4040F PNEUMOC VAC/ADMIN/RCVD: CPT | Performed by: FAMILY MEDICINE

## 2020-03-10 PROCEDURE — 1090F PRES/ABSN URINE INCON ASSESS: CPT | Performed by: FAMILY MEDICINE

## 2020-03-10 RX ORDER — APREMILAST 30 MG/1
30 TABLET, FILM COATED ORAL 2 TIMES DAILY
COMMUNITY
Start: 2020-01-14 | End: 2021-01-05

## 2020-03-10 NOTE — PROGRESS NOTES
[facility] 41 2019     Lab Results   Component Value Date    ALT 8 (L) 2019    AST 13 (L) 2019        Patient's medications, allergies, past medical, surgical, social and family histories were reviewed and updated asappropriate on 3/10/2020 at 10:44 AM.    ROS:  Review of Systems    All other systems reviewed and are negative except as noted above on 3/10/2020 at 10:44 AM. Additional review of systems may be scanned into the media section ofthis medical record. Any responses requiring further intervention were pursued. LDL Calculated (mg/dL)   Date Value   2019 41     Past Medical History:   Diagnosis Date    Closed nondisplaced fracture of distal phalanx of lesser toe of left foot 2017    DDD (degenerative disc disease)     Grave's disease     Hyperlipidemia     Lichen planus     Overactive bladder     Psoriasis     Radiculopathy         Family History   Problem Relation Age of Onset    Heart Disease Mother     Heart Disease Father      Social History     Socioeconomic History    Marital status:      Spouse name: Not on file    Number of children: Not on file    Years of education: Not on file    Highest education level: Not on file   Occupational History    Not on file   Social Needs    Financial resource strain: Not on file    Food insecurity     Worry: Not on file     Inability: Not on file    Transportation needs     Medical: Not on file     Non-medical: Not on file   Tobacco Use    Smoking status: Former Smoker     Packs/day: 0.15     Years: 5.00     Pack years: 0.75     Types: Cigarettes     Last attempt to quit: 1998     Years since quittin.6    Smokeless tobacco: Never Used   Substance and Sexual Activity    Alcohol use:  Yes    Drug use: No    Sexual activity: Yes     Partners: Male   Lifestyle    Physical activity     Days per week: Not on file     Minutes per session: Not on file    Stress: Not on file   Relationships    Social connections     Talks on phone: Not on file     Gets together: Not on file     Attends Muslim service: Not on file     Active member of club or organization: Not on file     Attends meetings of clubs or organizations: Not on file     Relationship status: Not on file    Intimate partner violence     Fear of current or ex partner: Not on file     Emotionally abused: Not on file     Physically abused: Not on file     Forced sexual activity: Not on file   Other Topics Concern    Not on file   Social History Narrative    Not on file       Prior to Visit Medications    Medication Sig Taking? Authorizing Provider   OTEZLA 30 MG TABS Take 30 mg by mouth 2 times daily Yes Historical Provider, MD   levothyroxine (SYNTHROID) 50 MCG tablet TAKE 1 TABLET BY MOUTH EVERY DAY Yes Genia Osborn APRN - CNP   atorvastatin (LIPITOR) 10 MG tablet TAKE 1 TABLET DAILY Yes Jamel Temple MD   escitalopram (LEXAPRO) 10 MG tablet TAKE 1 TABLET BY MOUTH EVERY DAY Yes JYOTI Modi - CNP   clobetasol (TEMOVATE) 0.05 % cream Apply to affected area twice daily Yes Chasity Ramirez MD   ketoconazole (NIZORAL) 2 % shampoo Apply topically daily as needed. Yes Chasity Ramirez MD   [facility] Natural Products (OSTEO BI-FLEX JOINT SHIELD PO) Take by mouth + VITAMIN D Yes Historical Provider, MD   ibandronate (BONIVA) 150 MG tablet Take 150 mg by mouth every 30 days Take 1 tablet every 30 days. Yes Historical Provider, MD   tolterodine (DETROL LA) 4 MG ER capsule Take 4 mg by mouth daily. Yes Historical Provider, MD   timolol (BETIMOL) 0.5 % ophthalmic solution 1 drop 2 times daily. Yes Historical Provider, MD     Allergies   Allergen Reactions    Bacitracin        OBJECTIVE:  Estimated body mass index is 25.23 kg/m² as calculated from the following:    Height as of this encounter: 5' 4\" (1.626 m). Weight as of this encounter: 147 lb (66.7 kg).   Vitals:    03/10/20 1016   BP: (!) 144/76   Site: Left Upper Arm

## 2020-04-15 ENCOUNTER — TELEPHONE (OUTPATIENT)
Dept: DERMATOLOGY | Age: 80
End: 2020-04-15

## 2020-05-26 ENCOUNTER — TELEPHONE (OUTPATIENT)
Dept: FAMILY MEDICINE CLINIC | Age: 80
End: 2020-05-26

## 2020-05-27 ENCOUNTER — HOSPITAL ENCOUNTER (OUTPATIENT)
Age: 80
Discharge: HOME | End: 2020-05-27
Payer: MEDICARE

## 2020-05-27 ENCOUNTER — HOSPITAL ENCOUNTER (OUTPATIENT)
Dept: CT IMAGING | Age: 80
Discharge: HOME | End: 2020-05-27
Payer: MEDICARE

## 2020-05-27 LAB
CREAT SERPL-MCNC: 1.1 MG/DL (ref 0.6–1.2)
GFR AFRICAN AMERICAN: 58
GFR NON-AFRICAN AMERICAN: 48

## 2020-05-27 PROCEDURE — 70470 CT HEAD/BRAIN W/O & W/DYE: CPT

## 2020-05-27 PROCEDURE — 6360000004 HC RX CONTRAST MEDICATION: Performed by: FAMILY MEDICINE

## 2020-05-27 PROCEDURE — 36415 COLL VENOUS BLD VENIPUNCTURE: CPT

## 2020-05-27 PROCEDURE — 82565 ASSAY OF CREATININE: CPT

## 2020-05-27 RX ADMIN — IOPAMIDOL 75 ML: 755 INJECTION, SOLUTION INTRAVENOUS at 11:19

## 2020-06-12 RX ORDER — LEVOTHYROXINE SODIUM 0.05 MG/1
TABLET ORAL
Qty: 90 TABLET | Refills: 1 | Status: SHIPPED | OUTPATIENT
Start: 2020-06-12 | End: 2020-10-14 | Stop reason: SDUPTHER

## 2020-07-24 ENCOUNTER — OFFICE VISIT (OUTPATIENT)
Dept: DERMATOLOGY | Age: 80
End: 2020-07-24
Payer: MEDICARE

## 2020-07-24 ENCOUNTER — TELEPHONE (OUTPATIENT)
Dept: DERMATOLOGY | Age: 80
End: 2020-07-24

## 2020-07-24 VITALS — TEMPERATURE: 97.7 F

## 2020-07-24 PROCEDURE — 99213 OFFICE O/P EST LOW 20 MIN: CPT | Performed by: DERMATOLOGY

## 2020-07-24 RX ORDER — FLUOCINONIDE TOPICAL SOLUTION USP, 0.05% 0.5 MG/ML
SOLUTION TOPICAL
Qty: 60 ML | Refills: 3 | Status: SHIPPED | OUTPATIENT
Start: 2020-07-24 | End: 2021-01-05 | Stop reason: SDUPTHER

## 2020-07-24 RX ORDER — CLOBETASOL PROPIONATE 0.46 MG/ML
SOLUTION TOPICAL
Qty: 50 ML | Refills: 3 | Status: SHIPPED | OUTPATIENT
Start: 2020-07-24 | End: 2020-07-24

## 2020-07-24 RX ORDER — KETOCONAZOLE 20 MG/ML
SHAMPOO TOPICAL
Qty: 120 ML | Refills: 11 | Status: SHIPPED | OUTPATIENT
Start: 2020-07-24 | End: 2021-04-30 | Stop reason: SDUPTHER

## 2020-07-24 NOTE — PATIENT INSTRUCTIONS
Skin Cancer Prevention: After Your Visit    Skin cancer is the abnormal growth of cells in the skin. It usually appears as a growth that changes in color, shape, or size. This can be a sore that does not heal or a change in a wart or a mole. Skin cancer is almost always curable when found early and treated. So it is important to see your doctor if you have any of these changes in your skin. Skin cancer is the most common type of cancer. It often appears on areas of the body that have been exposed to the sun, such as the head, face, neck, back, chest, or shoulders. Follow-up care is a key part of your treatment and safety. Be sure to make and go to all appointments, and call your doctor if you are having problems. It's also a good idea to know your test results and keep a list of the medicines you take. How can you care for yourself at home?  - Wear a wide-brimmed hat and long sleeves and pants if you are going to be outdoors for a long time. - Avoid the sun between 10 a.m. and 4 p.m., which is the peak time for UV rays. - Wear sunscreen on exposed skin. Make sure the sunscreen blocks ultraviolet rays (both UVA and UVB) and has a sun protection factor (SPF) of at least 30. Use it every day, even when it is cloudy. Some doctors may recommend a higher SPF, such as 48.  - Do not use tanning booths or sunlamps. - Use lip balm or cream that has sun protection factor (SPF) to protect your lips from getting sunburned or getting cold sores. - Wear sunglasses that block UV rays. When should you call for help? Watch closely for changes in your health, and be sure to contact your doctor if:  - You are concerned about any problem areas on your skin. - You notice a change in a mole or skin growth. For example:  a. It gets bigger. b. It develops uneven borders. c. It gets thicker, raised, or worn down. d. It changes color. e. It starts to bleed easily.

## 2020-07-24 NOTE — PROGRESS NOTES
Patient's Name: Kirt Cutler  MRN: <>  YOB: 1940  Date of Visit: 7/24/2020  Primary Care Provider: Rich Cronin MD  Referring Provider: No ref. provider found    Subjective:     Chief Complaint   Patient presents with    Psoriasis     arms, and scalp much improvement, itchy scalp sometimes on otezla       History of Present Illness: Patient is a new patient to my practice previously evaluated and treated by Dr. Deborah Fernandez. Patient presents for follow-up of psoriasis. Patient was last evaluated on 1/8/20. At their last visit they were prescribed:   Dr. Juan Vicente note on 1/8/20  ASSESSMENT AND PLAN:    1.)  Psoriasis and sebopsoriasis of scalp, previously markedly improved on otezla but intolerant to side effects and hence discontinued. Disease  has proven recalcitrant to multiple topicals to date and is failing to improve on phototherapy comb. Patient and I are both reluctant to go to biologic medication given her age and multiple co-morbidities; would like to try otezla again:  - Given patient's age and co-morbidties, she is not a good candidate for DMARDs. - Will restart otezla but at a modified dosing protocol with a protracted loading phase course and maintenance dosing of initially 30 mg daily to hopefully preepempt the GI side effects. Will be sent with starter pack. Edu: GI upset, diarrhea, SI, weight loss, dyspepsia, headache, nasopharyngitis   - Discussed tx with immodium and metamucil as well as other bulk-forming agents and bismuth subsalicylate. We can try an off-label approach, decreasing the apremilast dose to once a day. If the patient gets relief on that dosage, can increase it to 30 mg two times, every other day; then, to the recommended and approved 30 mg, twice daily.    - Cont photherapy comb at [facility]; suggest also mineral oil prior to tx   - Continue keto shampoo but urged to wash hair a few times weekly AT LEAST  - desonide cream to face  - 2% sal acid creams provided today by [employer]  - keto cream to face daily  - lexette foam samples daily to scalp  - clobetasol to body bid prn    Patient reports significant improvement of their psoriasis since last visit, which her  attest to. She uses ketoconazole shampoo and infrequently Lidex to scalp. She reports her body psoriasis is almost non existent, with only 1-2 small spots on her arm. Denies any new, non healing, changing or bleeding lesions. Past medical/surgical/family history reviewed with no changes since last visit on 1/8/20    Past Medical History:  Past Medical History:   Diagnosis Date    Closed nondisplaced fracture of distal phalanx of lesser toe of left foot 6/14/2017    DDD (degenerative disc disease)     Grave's disease     Hyperlipidemia     Lichen planus     Overactive bladder     Psoriasis     Radiculopathy        Past Surgical History:  Past Surgical History:   Procedure Laterality Date    CYST REMOVAL      from right forearm 9/2016    EYE SURGERY      HYSTERECTOMY         Past Family History:  Family History   Problem Relation Age of Onset    Heart Disease Mother     Heart Disease Father          Allergies: Allergies   Allergen Reactions    Bacitracin        Current Medications:  Current Outpatient Medications   Medication Sig Dispense Refill    levothyroxine (SYNTHROID) 50 MCG tablet TAKE 1 TABLET BY MOUTH EVERY DAY 90 tablet 1    OTEZLA 30 MG TABS Take 30 mg by mouth 2 times daily      atorvastatin (LIPITOR) 10 MG tablet TAKE 1 TABLET DAILY 90 tablet 3    escitalopram (LEXAPRO) 10 MG tablet TAKE 1 TABLET BY MOUTH EVERY DAY 90 tablet 1    clobetasol (TEMOVATE) 0.05 % cream Apply to affected area twice daily 60 g 2    ketoconazole (NIZORAL) 2 % shampoo Apply topically daily as needed.  120 mL 11    Misc Natural Products (OSTEO BI-FLEX JOINT SHIELD PO) Take by mouth + VITAMIN D      ibandronate (BONIVA) 150 MG tablet Take 150 mg by mouth every 30 days Take 1 tablet every 30 days.  tolterodine (DETROL LA) 4 MG ER capsule Take 4 mg by mouth daily.  timolol (BETIMOL) 0.5 % ophthalmic solution 1 drop 2 times daily. No current facility-administered medications for this visit. Review of Systems:  Constitutional: No fevers, chills or recent illness. feels well   Skin: Skin:As per HPI AND otherwise no new, bleeding or symptomatic skin lesions  Jonathan Sanchez while taking imodium. Objective:     Vitals:    07/24/20 1141   Temp: 97.7 °F (36.5 °C)   TempSrc: Temporal     Physical Examination:  General: alert, comfortable, no apparent distress, well-appearing  Psych: alert, oriented and pleasant  Neuro: oriented to person, place, and time  Skin: Areas examined: head including face, lips, conjunctiva and lids, neck, hair/scalp, right upper extremity, left upper extremity, right lower extremity and left lower extremity      All areas examined were within normal limits except those listed below with the appropriate assessment and plan    Assessment and Plan (with relevant objective exam findings):     1. Poriasis, plaque type improved on Otezla  Location: scalp, left forearm  Objective findings:  Small erythematous scaly plaque    -Continue otezla 30 mg twice daily. Patient will continue with her anti-diarrheal agents. Educated patient on potential side effects such as diarrhea and weight loss. - Continue Ketoconazole shampoo M,W, F  - Will prescribe Lidex, which patient prefers for her scalp psoriasis to be applied twice daily as needed with flares. 2. Solar Lentigo  Location: sun exposed areas, most prominently on the dorsal hands, face, forearms and neck      Objective: Numerous lacy brown macules ranging in size from 2-10 mm diameter. The lentigines seen today are benign in character, caused by the sun, and do not require treatment. However, they do occasionally transform into a malignancy, so the patient needs to monitor for changes.  They were

## 2020-07-24 NOTE — TELEPHONE ENCOUNTER
Patient wants to know if she can be prescribed fluocinonide solution, instead of clobetasol? Its what she prefers. Please review/advise.

## 2020-08-19 RX ORDER — ESCITALOPRAM OXALATE 10 MG/1
TABLET ORAL
Qty: 90 TABLET | Refills: 3 | Status: SHIPPED | OUTPATIENT
Start: 2020-08-19 | End: 2021-08-09

## 2020-10-14 ENCOUNTER — OFFICE VISIT (OUTPATIENT)
Dept: FAMILY MEDICINE CLINIC | Age: 80
End: 2020-10-14
Payer: MEDICARE

## 2020-10-14 VITALS
TEMPERATURE: 98.3 F | SYSTOLIC BLOOD PRESSURE: 124 MMHG | BODY MASS INDEX: 24.17 KG/M2 | HEART RATE: 54 BPM | OXYGEN SATURATION: 98 % | DIASTOLIC BLOOD PRESSURE: 62 MMHG | WEIGHT: 140.8 LBS

## 2020-10-14 PROBLEM — R09.82 POST-NASAL DISCHARGE: Status: ACTIVE | Noted: 2020-10-14

## 2020-10-14 LAB
ANION GAP SERPL CALCULATED.3IONS-SCNC: 9 MMOL/L (ref 3–16)
BUN BLDV-MCNC: 17 MG/DL (ref 7–20)
CALCIUM SERPL-MCNC: 9.3 MG/DL (ref 8.3–10.6)
CHLORIDE BLD-SCNC: 104 MMOL/L (ref 99–110)
CO2: 26 MMOL/L (ref 21–32)
CREAT SERPL-MCNC: 0.9 MG/DL (ref 0.6–1.2)
GFR AFRICAN AMERICAN: >60
GFR NON-AFRICAN AMERICAN: >60
GLUCOSE BLD-MCNC: 86 MG/DL (ref 70–99)
MAGNESIUM: 2.5 MG/DL (ref 1.8–2.4)
POTASSIUM SERPL-SCNC: 4.5 MMOL/L (ref 3.5–5.1)
SODIUM BLD-SCNC: 139 MMOL/L (ref 136–145)

## 2020-10-14 PROCEDURE — G8427 DOCREV CUR MEDS BY ELIG CLIN: HCPCS | Performed by: FAMILY MEDICINE

## 2020-10-14 PROCEDURE — 1123F ACP DISCUSS/DSCN MKR DOCD: CPT | Performed by: FAMILY MEDICINE

## 2020-10-14 PROCEDURE — 1090F PRES/ABSN URINE INCON ASSESS: CPT | Performed by: FAMILY MEDICINE

## 2020-10-14 PROCEDURE — G8482 FLU IMMUNIZE ORDER/ADMIN: HCPCS | Performed by: FAMILY MEDICINE

## 2020-10-14 PROCEDURE — 99214 OFFICE O/P EST MOD 30 MIN: CPT | Performed by: FAMILY MEDICINE

## 2020-10-14 PROCEDURE — 4040F PNEUMOC VAC/ADMIN/RCVD: CPT | Performed by: FAMILY MEDICINE

## 2020-10-14 PROCEDURE — 1036F TOBACCO NON-USER: CPT | Performed by: FAMILY MEDICINE

## 2020-10-14 PROCEDURE — G8399 PT W/DXA RESULTS DOCUMENT: HCPCS | Performed by: FAMILY MEDICINE

## 2020-10-14 PROCEDURE — 36415 COLL VENOUS BLD VENIPUNCTURE: CPT | Performed by: FAMILY MEDICINE

## 2020-10-14 PROCEDURE — G8420 CALC BMI NORM PARAMETERS: HCPCS | Performed by: FAMILY MEDICINE

## 2020-10-14 RX ORDER — ATORVASTATIN CALCIUM 10 MG/1
TABLET, FILM COATED ORAL
Qty: 90 TABLET | Refills: 3 | Status: SHIPPED | OUTPATIENT
Start: 2020-10-14 | End: 2021-10-04 | Stop reason: SDUPTHER

## 2020-10-14 RX ORDER — LEVOTHYROXINE SODIUM 0.05 MG/1
TABLET ORAL
Qty: 90 TABLET | Refills: 3 | Status: SHIPPED | OUTPATIENT
Start: 2020-10-14 | End: 2020-12-21

## 2020-10-14 RX ORDER — FLUTICASONE PROPIONATE 50 MCG
SPRAY, SUSPENSION (ML) NASAL
Qty: 12 G | Refills: 3 | Status: SHIPPED | OUTPATIENT
Start: 2020-10-14

## 2020-10-14 NOTE — PATIENT INSTRUCTIONS
Get tonic water and mix 6 ounces of this with your drink of choice and drink this before bed. Start using flonase and zyrtec for your nasal drainage and cough and you may stop after the first hard frost.     Patient should call the office immediately with new or ongoing signs or symptoms or worsening, or proceed to the emergency room. If you are on medications which could impair your senses, you are at risk of weakness, falls, dizziness, or drowsiness. You should be careful during activities which could place you at risk of harm, such as climbing, using stairs, operating machinery, or driving vehicles. If you feel you cannot safely do these activities, you should request others to help you, or avoid the activities altogether. If you are drowsy for any other reason, you should use the same precautions as listed above.

## 2020-10-14 NOTE — PROGRESS NOTES
Chief Complaint   Patient presents with    Hyperlipidemia    Hyperthyroidism    Psoriasis    Other     multiple medical conditions     Patient has been having leg cramps at night. They are always at night. Usually in the lower legs or if she sleeps in her side she has pain in her hip. Patient is also having some nasal drainage down the back of her throat and a cough. Looked at left hand and no longer see the area of concern noted at last office visit. Discussed her falling and CT scan for her memory issues. Patient has not been falling anymore per pt and . denied wanting to see a neurologist. Talked to patient about her leg cramping, she stated they are mostly in the calves. Patient has not been taking magnesium supplement anymore. Patient has been trying theraworks that she saw on TV to try and help with her leg cramps. Celine Allison this worked for a bit but now its not helping. Patient is still having nasal drainage and a cough. Believe it to be seasonal allergies. Discussed using flonase and zyrtec    HPI:  Marlene Ibarra is a [de-identified] y.o. (: 1940) here today for  Hyperlipidemia:  No new myalgias or GI upset on atorvastatin (Lipitor). Medication compliance: compliant all of the time. Patient is not following a low fat, low cholesterol diet. She is not exercising regularly. Lab Results   Component Value Date    CHOL 150 03/10/2020    TRIG 81 03/10/2020    HDL 85 (H) 03/10/2020    LDLCALC 49 03/10/2020     Lab Results   Component Value Date    ALT 8 (L) 03/10/2020    AST 16 03/10/2020          Patient's medications, allergies, past medical, surgical, social and family histories were reviewed and updated asappropriate on 10/14/2020 at 9:28 AM.    ROS:  Review of Systems    All other systems reviewed and are negative except as noted above on 10/14/2020 at 9:28 AM. Additional review of systems may be scanned into the media section ofthis medical record.   Any responses requiring further intervention file       Prior to Visit Medications    Medication Sig Taking? Authorizing Provider   escitalopram (LEXAPRO) 10 MG tablet TAKE 1 TABLET DAILY  Rama Almodovar MD   ketoconazole (NIZORAL) 2 % shampoo Apply shampoo three times weekly (M, W, F) leave in 3-5 min, then rinse. May alternate with other shampoos  Awa Beckett MD   fluocinonide (LIDEX) 0.05 % external solution Apply to scalp twice daily for two weeks, then taper to as needed with flares. Awa Beckett MD   levothyroxine (SYNTHROID) 50 MCG tablet TAKE 1 TABLET BY MOUTH EVERY DAY  Rama Almodovar MD   OTEZLA 30 MG TABS Take 30 mg by mouth 2 times daily  Historical Provider, MD   atorvastatin (LIPITOR) 10 MG tablet TAKE 1 TABLET DAILY  Rama Almodovar MD   clobetasol (TEMOVATE) 0.05 % cream Apply to affected area twice daily  Shayla Byrne MD   Misc Natural Products (OSTEO BI-FLEX JOINT SHIELD PO) Take by mouth + VITAMIN D  Historical Provider, MD   ibandronate (BONIVA) 150 MG tablet Take 150 mg by mouth every 30 days Take 1 tablet every 30 days. Historical Provider, MD   tolterodine (DETROL LA) 4 MG ER capsule Take 4 mg by mouth daily. Historical Provider, MD   timolol (BETIMOL) 0.5 % ophthalmic solution 1 drop 2 times daily. Historical Provider, MD     Allergies   Allergen Reactions    Bacitracin        OBJECTIVE:  Estimated body mass index is 25.23 kg/m² as calculated from the following:    Height as of 3/10/20: 5' 4\" (1.626 m). Weight as of 3/10/20: 147 lb (66.7 kg).   Vitals:    10/14/20 0931   BP: 124/62   Site: Right Upper Arm   Position: Sitting   Pulse: 54   Temp: 98.3 °F (36.8 °C)   TempSrc: Oral   SpO2: 98%   Weight: 140 lb 12.8 oz (63.9 kg)     BP Readings from Last 2 Encounters:   03/10/20 (!) 144/76   10/29/19 130/70     Wt Readings from Last 3 Encounters:   03/10/20 147 lb (66.7 kg)   10/29/19 149 lb 3.2 oz (67.7 kg)   09/10/19 145 lb 12.8 oz (66.1 kg)       Physical Exam  Vitals signs and nursing note reviewed. Constitutional:       General: She is not in acute distress. Appearance: She is well-developed. She is not diaphoretic. HENT:      Head: Normocephalic and atraumatic. Right Ear: External ear normal.      Left Ear: External ear normal.      Nose: Nose normal.   Eyes:      General: Lids are normal. No scleral icterus. Right eye: No discharge. Left eye: No discharge. Pupils: Pupils are equal, round, and reactive to light. Neck:      Thyroid: No thyromegaly. Vascular: No JVD. Cardiovascular:      Rate and Rhythm: Normal rate and regular rhythm. Heart sounds: Normal heart sounds. Pulmonary:      Effort: Pulmonary effort is normal. No respiratory distress. Breath sounds: Normal breath sounds. Abdominal:      Palpations: Abdomen is soft. There is no hepatomegaly or splenomegaly. Tenderness: There is no abdominal tenderness. Skin:     General: Skin is warm and dry. Coloration: Skin is not pale. Findings: No erythema or rash. Comments: Turgor normal   Psychiatric:         Behavior: Behavior normal.         Thought Content: Thought content normal.         Judgment: Judgment normal.              ASSESSMENT PLAN      Diagnosis Orders   1. Leg cramps  MAGNESIUM   2. Mixed hyperlipidemia  atorvastatin (LIPITOR) 10 MG tablet   3. Decreased calculated GFR  Basic Metabolic Panel   4. Graves disease  levothyroxine (SYNTHROID) 50 MCG tablet   5. Lumbar degenerative disc disease     6. Psoriasis     7. Post-nasal discharge     8. Cough  fluticasone (FLONASE) 50 MCG/ACT nasal spray   9. Nasal drainage  fluticasone (FLONASE) 50 MCG/ACT nasal spray   10. Sore throat  fluticasone (FLONASE) 50 MCG/ACT nasal spray   We have recommended tonic water 6 ounces at night for her leg cramps. When she had taken supplemental magnesium before her magnesium level was actually too high. Continue lipid monitoring as needed.   We may want to consider changing to rosuvastatin in the future. Follow-up her chronic kidney disease. Thyroid replacement by symptoms appears appropriate. Back is about the same. Previously she complained of fatigue and the medicine she was on had a 3% fatigue right. Her  today indicates that she still has struggles with her memory I do not think there is much we can do with that. We had talked about neurology referral last visit for falls and memory loss letter saying she is not struggling with her falls at this time. The Flonase plus antihistamine helped her cough and nasal drainage before and then she stopped it now symptoms are back I told her it was okay to restart it. Regular follow-up in 6 months. Patient should call the office immediately with new or ongoing signs or symptoms or worsening, or proceed to the emergency room. No changes in past medical history, past surgical history, social history, or family history were noted during the patient encounter unless specifically listed above. All updates of past medical history, past surgical history, social history, or family history were reviewed personally by me during the office visit. All problems listed in the assessment are stable unless noted otherwise. Medication profile reviewed personally by me during the visit. Medication side effects and possible impairments from medications were discussed as applicable. This document was prepared by a combination of typing and transcription through a voice recognition software. Scribe attestation: Luis Hodges RN, am scribing for and in the presence of Piter Aleman MD. Electronically signed by Jimbo Mcdonnell RN on 10/14/2020 at 9:28 AM      Provider attestation:     I, Dr. Nicolás Goldberg, personally performed the services described in this documentation, as scribed by the above signed scribe in my presence, and it is both accurate and complete.  I agree with the ROS and Past Histories independently

## 2020-11-23 ENCOUNTER — TELEPHONE (OUTPATIENT)
Dept: DERMATOLOGY | Age: 80
End: 2020-11-23

## 2020-12-07 ENCOUNTER — OFFICE VISIT (OUTPATIENT)
Dept: DERMATOLOGY | Age: 80
End: 2020-12-07
Payer: MEDICARE

## 2020-12-07 VITALS — TEMPERATURE: 97.2 F

## 2020-12-07 PROCEDURE — 1123F ACP DISCUSS/DSCN MKR DOCD: CPT | Performed by: DERMATOLOGY

## 2020-12-07 PROCEDURE — 1090F PRES/ABSN URINE INCON ASSESS: CPT | Performed by: DERMATOLOGY

## 2020-12-07 PROCEDURE — G8399 PT W/DXA RESULTS DOCUMENT: HCPCS | Performed by: DERMATOLOGY

## 2020-12-07 PROCEDURE — G8482 FLU IMMUNIZE ORDER/ADMIN: HCPCS | Performed by: DERMATOLOGY

## 2020-12-07 PROCEDURE — 99214 OFFICE O/P EST MOD 30 MIN: CPT | Performed by: DERMATOLOGY

## 2020-12-07 PROCEDURE — 4040F PNEUMOC VAC/ADMIN/RCVD: CPT | Performed by: DERMATOLOGY

## 2020-12-07 PROCEDURE — G8420 CALC BMI NORM PARAMETERS: HCPCS | Performed by: DERMATOLOGY

## 2020-12-07 PROCEDURE — G8427 DOCREV CUR MEDS BY ELIG CLIN: HCPCS | Performed by: DERMATOLOGY

## 2020-12-07 PROCEDURE — 1036F TOBACCO NON-USER: CPT | Performed by: DERMATOLOGY

## 2020-12-07 NOTE — PROGRESS NOTES
Patient's Name: Joli Najjar  MRN: <>  YOB: 1940  Date of Visit: 12/7/2020  Primary Care Provider: Surekha Page MD    Subjective:     Chief Complaint   Patient presents with    Follow-up     s/e to [facility], patient cut dosage in half per Dr. Gifty Vidal instruction        The patient returns to clinic for a follow-up of psoriasis. She reports that she is currently not clear of symptoms. Current treatment includes Otezla 30 mg once daily and Fluocinonide solution 1-2 x /week to scalp. Patient continues to report some diarrhea since decreasing the dose, however the nausea, fatigue resolved. She has not been taking anti-diarrheal medication recently. She reports itching and flaking in her ears. Denies any new plaques on her body, except for scalp. She denies this disturbs sleep. The patient has not been diagnosed with psoriatic arthritis. The patient denies experiencing joint symptoms. She  denies having swelling or redness of fingers or toes. The patient denies having features of nail psoriasis. Since her last visit, there have been no changes to her medical/family history, allergies or medications. Allergies: Allergies   Allergen Reactions    Bacitracin        Current Medications:  Current Outpatient Medications   Medication Sig Dispense Refill    levothyroxine (SYNTHROID) 50 MCG tablet TAKE 1 TABLET BY MOUTH EVERY DAY 90 tablet 3    atorvastatin (LIPITOR) 10 MG tablet TAKE 1 TABLET DAILY 90 tablet 3    fluticasone (FLONASE) 50 MCG/ACT nasal spray SPRAY 1 SPRAY INTO EACH NOSTRIL EVERY DAY 12 g 3    escitalopram (LEXAPRO) 10 MG tablet TAKE 1 TABLET DAILY 90 tablet 3    ketoconazole (NIZORAL) 2 % shampoo Apply shampoo three times weekly (M, W, F) leave in 3-5 min, then rinse.  May alternate with other shampoos 120 mL 11    fluocinonide (LIDEX) 0.05 % external solution Apply to scalp twice daily for two weeks, then taper to as needed with flares. 60 mL 3    OTEZLA 30 MG TABS Take 30 mg by mouth 2 times daily      clobetasol (TEMOVATE) 0.05 % cream Apply to affected area twice daily 60 g 2    Misc Natural Products (OSTEO BI-FLEX JOINT SHIELD PO) Take by mouth + VITAMIN D      ibandronate (BONIVA) 150 MG tablet Take 150 mg by mouth every 30 days Take 1 tablet every 30 days.  tolterodine (DETROL LA) 4 MG ER capsule Take 4 mg by mouth daily.  timolol (BETIMOL) 0.5 % ophthalmic solution 1 drop 2 times daily. No current facility-administered medications for this visit. Review of Systems:  Constitutional: No fevers, chills or recent illness. Slightly  fatigued   Skin: Skin:As per HPI AND otherwise no new, bleeding or symptomatic skin lesions      Objective:     Vitals:    12/07/20 1115   Temp: 97.2 °F (36.2 °C)         Physical Examination:  General: alert, comfortable, no apparent distress, well-appearing  Psych: alert, oriented and pleasant  Neuro: oriented to person, place, and time  Skin: Areas examined: head including face, lips, conjunctiva and lids, neck, hair/scalp, right upper extremity, left upper extremity, left hand, right hand and digits and nails      All areas examined were within normal limits except those listed below with the appropriate assessment and plan      LAB RESULTS: No labs      Assessment and Plan (with relevant objective exam findings):   1. Scalp psoriasis  flaring with side effects of medication  Location: left temporal scalp, occipital scalp and conchal bowls  Objective findings:  Well-demarcated, erythematous, psoriasiform plaques with scale. Conchal bowls with scale  Discussed continuing the lower dose versus changing to a different systemic medication (biologics) and reviewed potential side effects.  Following our discussion or risks/benefits and alternatives patient opted for:    - Given that patient developed side effects from BID dosing of Otezla, we will continue on 30 mg once daily for this month and adjust in one month if tolerated. - Lidex solution to scalp and ears twice daily as needed  - Patient will re-start antidiarrheal and adjust as needed based on symptoms. 2. Solar Lentigo  Location: sun exposed areas, most prominently on the dorsal hands, face and neck      Objective: Numerous lacy brown macules ranging in size from 2-10 mm diameter. The lentigines seen today are benign in character, caused by the sun, and do not require treatment. However, they do occasionally transform into a malignancy, so the patient needs to monitor for changes. They were educated on what a suspicious change would include, change in size or color, and included education on the ABCDs of melanoma. These lesions can also be removed for cosmetic purposes with chemical peels, laser or cryosurgery for a cosmetic fee if the patient desires. Follow up:  Return visit in 4 weeks or as needed for change in condition. All questions addressed.      Procedure:   No procedure performed      Huseyin Jaffe MD, MS

## 2020-12-08 ENCOUNTER — VIRTUAL VISIT (OUTPATIENT)
Dept: FAMILY MEDICINE CLINIC | Age: 80
End: 2020-12-08
Payer: MEDICARE

## 2020-12-08 VITALS — TEMPERATURE: 98 F | WEIGHT: 140 LBS | BODY MASS INDEX: 23.9 KG/M2 | HEIGHT: 64 IN

## 2020-12-08 PROCEDURE — G0439 PPPS, SUBSEQ VISIT: HCPCS | Performed by: FAMILY MEDICINE

## 2020-12-08 PROCEDURE — 1123F ACP DISCUSS/DSCN MKR DOCD: CPT | Performed by: FAMILY MEDICINE

## 2020-12-08 PROCEDURE — 4040F PNEUMOC VAC/ADMIN/RCVD: CPT | Performed by: FAMILY MEDICINE

## 2020-12-08 ASSESSMENT — LIFESTYLE VARIABLES
AUDIT TOTAL SCORE: 4
AUDIT-C TOTAL SCORE: 4
HOW OFTEN DURING THE LAST YEAR HAVE YOU FOUND THAT YOU WERE NOT ABLE TO STOP DRINKING ONCE YOU HAD STARTED: 0
HOW OFTEN DURING THE LAST YEAR HAVE YOU BEEN UNABLE TO REMEMBER WHAT HAPPENED THE NIGHT BEFORE BECAUSE YOU HAD BEEN DRINKING: 0
HAS A RELATIVE, FRIEND, DOCTOR, OR ANOTHER HEALTH PROFESSIONAL EXPRESSED CONCERN ABOUT YOUR DRINKING OR SUGGESTED YOU CUT DOWN: 0
HAVE YOU OR SOMEONE ELSE BEEN INJURED AS A RESULT OF YOUR DRINKING: 0
HOW MANY STANDARD DRINKS CONTAINING ALCOHOL DO YOU HAVE ON A TYPICAL DAY: 0
HOW OFTEN DO YOU HAVE A DRINK CONTAINING ALCOHOL: 4
HOW OFTEN DO YOU HAVE SIX OR MORE DRINKS ON ONE OCCASION: 0
HOW OFTEN DURING THE LAST YEAR HAVE YOU HAD A FEELING OF GUILT OR REMORSE AFTER DRINKING: 0
HOW OFTEN DURING THE LAST YEAR HAVE YOU FAILED TO DO WHAT WAS NORMALLY EXPECTED FROM YOU BECAUSE OF DRINKING: 0
HOW OFTEN DURING THE LAST YEAR HAVE YOU NEEDED AN ALCOHOLIC DRINK FIRST THING IN THE MORNING TO GET YOURSELF GOING AFTER A NIGHT OF HEAVY DRINKING: 0

## 2020-12-08 ASSESSMENT — PATIENT HEALTH QUESTIONNAIRE - PHQ9
SUM OF ALL RESPONSES TO PHQ QUESTIONS 1-9: 1
SUM OF ALL RESPONSES TO PHQ QUESTIONS 1-9: 1
1. LITTLE INTEREST OR PLEASURE IN DOING THINGS: 0
2. FEELING DOWN, DEPRESSED OR HOPELESS: 1
SUM OF ALL RESPONSES TO PHQ9 QUESTIONS 1 & 2: 1
SUM OF ALL RESPONSES TO PHQ QUESTIONS 1-9: 1

## 2020-12-08 NOTE — PROGRESS NOTES
Medicare Annual Wellness Visit  Name: Solmon Najjar Date: 2020   MRN: <> Sex: Female   Age: [de-identified] y.o. Ethnicity: Non-/Non    : 1940 Race: Erin Kurtz is here for Medicare AWV    Screenings for behavioral, psychosocial and functional/safety risks, and cognitive dysfunction are all negative except as indicated below. These results, as well as other patient data from the [address] form, are documented in Flowsheets linked to this Encounter. Allergies   Allergen Reactions    Bacitracin        Prior to Visit Medications    Medication Sig Taking? Authorizing Provider   levothyroxine (SYNTHROID) 50 MCG tablet TAKE 1 TABLET BY MOUTH EVERY DAY  Bryce Thomas MD   atorvastatin (LIPITOR) 10 MG tablet TAKE 1 TABLET DAILY  Bryce Thomas MD   fluticasone [facility]) 50 MCG/ACT nasal spray SPRAY 1 SPRAY INTO EACH NOSTRIL EVERY DAY  Bryce Thomas MD   escitalopram (LEXAPRO) 10 MG tablet TAKE 1 TABLET DAILY  Bryce Thomas MD   ketoconazole (NIZORAL) 2 % shampoo Apply shampoo three times weekly (M, W, F) leave in 3-5 min, then rinse. May alternate with other shampoos  Harjinder Avilez MD   fluocinonide (LIDEX) 0.05 % external solution Apply to scalp twice daily for two weeks, then taper to as needed with flares. Harjinder Avilez MD   OTEZLA 30 MG TABS Take 30 mg by mouth 2 times daily  Historical Provider, MD   clobetasol (TEMOVATE) 0.05 % cream Apply to affected area twice daily  Jazmin Blank MD   [facility] [place] [facility] Natural Products (OSTEO BI-FLEX JOINT SHIELD PO) Take by mouth + VITAMIN D  Historical Provider, MD   ibandronate (BONIVA) 150 MG tablet Take 150 mg by mouth every 30 days Take 1 tablet every 30 days. Historical Provider, MD   tolterodine (DETROL LA) 4 MG ER capsule Take 4 mg by mouth daily. Historical Provider, MD   timolol (BETIMOL) 0.5 % ophthalmic solution 1 drop 2 times daily.   Historical Provider, MD       Past Medical History:   Diagnosis Date    Closed nondisplaced fracture of distal phalanx of lesser toe of left foot 6/14/2017    DDD (degenerative disc disease)     Grave's disease     Hyperlipidemia     Lichen planus     Overactive bladder     Psoriasis     Radiculopathy        Past Surgical History:   Procedure Laterality Date    CYST REMOVAL      from right forearm 9/2016    EYE SURGERY      HYSTERECTOMY         Family History   Problem Relation Age of Onset    Heart Disease Mother     Heart Disease Father        CareTeam (Including outside providers/suppliers regularly involved in providing care):   Patient Care Team:  Ramo Olea MD as PCP - General (Family Medicine)  [facility]    Wt Readings from Last 3 Encounters:   12/08/20 140 lb (63.5 kg)   10/14/20 140 lb 12.8 oz (63.9 kg)   03/10/20 147 lb (66.7 kg)     Vitals:    12/08/20 1510   Temp: 98 °F (36.7 °C)   Weight: 140 lb (63.5 kg)   Height: 5' 4\" (1.626 m)     Body mass index is 24.03 kg/m². Based upon direct observation of the patient, evaluation of cognition reveals recent and remote memory intact. Patient's complete Health Risk Assessment and screening values have been reviewed and are found in Flowsheets. The following problems were reviewed today and where indicated follow up appointments were made and/or referrals ordered. Positive Risk Factor Screenings with Interventions:     General Health and ACP:  General  In general, how would you say your health is?: Good  In the past 7 days, have you experienced any of the following?  New or Increased Pain, New or Increased Fatigue, Loneliness, Social Isolation, Stress or Anger?: None of These  Do you get the social and emotional support that you need?: Yes  Do you have a Living Will?: Yes  Advance Directives     Power of  Living Will ACP-Advance Directive ACP-Power of     Not on File Not on Shanti Jo[address]efgerparmj[address] Risk Interventions:  · No Living Will: ACP documents already completed- patient asked to provide copy to the office    Health Habits/Nutrition:  Health Habits/Nutrition  Do you exercise for at least 20 minutes 2-3 times per week?: (!) No  Have you lost any weight without trying in the past 3 months?: No  Do you eat fewer than 2 meals per day?: No  Have you seen a dentist within the past year?: (!) No  Body mass index: 24.03  Health Habits/Nutrition Interventions:  · Inadequate physical activity:  educational materials provided to promote increased physical activity  · Dental exam overdue:  patient declines dental evaluation    Personalized Preventive Plan   Current Health Maintenance Status  Immunization History   Administered Date(s) Administered    Hepatitis A/Hepatitis B (Twinrix) 06/24/2014    Influenza Vaccine, unspecified formulation 10/13/2016    Influenza Virus Vaccine 11/03/2015, 10/11/2017, 08/20/2019    Influenza, High Dose (Fluzone 65 yrs and older) 09/06/2018    Influenza, Quadv, IM, PF (6 mo and older Fluzone, Flulaval, Fluarix, and 3 yrs and older Afluria) 09/08/2020    Influenza, Triv, inactivated, subunit, adjuvanted, IM (Fluad 65 yrs and older) 08/20/2019    Pneumococcal Conjugate 13-valent (Flhjlxw50) 04/14/2015    Pneumococcal Polysaccharide (Nykhmxsuj12) 05/08/2017    Tdap (Boostrix, Adacel) 05/09/2017    Zoster Recombinant (Shingrix) 08/01/2018, 10/16/2018        Health Maintenance   Topic Date Due    Annual Wellness Visit (AWV)  05/29/2019    Lipid screen  03/10/2021    TSH testing  03/10/2021    DTaP/Tdap/Td vaccine (2 - Td) 05/09/2027    Flu vaccine  Completed    Shingles Vaccine  Completed    Pneumococcal 65+ years Vaccine  Completed    DEXA (modify frequency per FRAX score)  Addressed    Hepatitis A vaccine  Aged Out    Hepatitis B vaccine  Aged Out    Hib vaccine  Aged Out    Meningococcal (ACWY) vaccine  Aged Out Recommendations for Preventive Services Due: see orders and patient instructions/AVS.  . Recommended screening schedule for the next 5-10 years is provided to the patient in written form: see Patient Instructions/AVS.    ILuís LPN, 93/8/2515, performed the documented evaluation under the direct supervision of the attending physician. Chris Rosen is a [de-identified] y.o. female being evaluated by a Virtual Visit (phone visit) encounter to address concerns as mentioned above. A caregiver was present when appropriate. Due to this being a TeleHealth encounter (During IHDCT-42 public health emergency), evaluation of the following organ systems was limited: Vitals/Constitutional/EENT/Resp/CV/GI//MS/Neuro/Skin/Heme-Lymph-Imm. Pursuant to the emergency declaration under the [address] [place] [address], [address] [place] [address] authority and the [employer] Act, this Virtual Visit was conducted with patient's (and/or legal guardian's) consent, to reduce the patient's risk of exposure to COVID-19 and provide necessary medical care. The patient (and/or legal guardian) has also been advised to contact this office for worsening conditions or problems, and seek emergency medical treatment and/or call 911 if deemed necessary. Patient identification was verified at the start of the visit: Yes    Total time spent for this encounter: 10 min    Services were provided through a video synchronous discussion virtually to substitute for in-person clinic visit. Patient and provider were located at their individual homes. --Luís Turner LPN on 73/4/1302 at 3:91 PM    An electronic signature was used to authenticate this note. This encounter was performed under Reggie laughlin MDs, direct supervision, 12/8/2020.

## 2020-12-08 NOTE — PATIENT INSTRUCTIONS
Personalized Preventive Plan for Flor Reef - 12/8/2020  Medicare offers a range of preventive health benefits. Some of the tests and screenings are paid in full while other may be subject to a deductible, co-insurance, and/or copay. Some of these benefits include a comprehensive review of your medical history including lifestyle, illnesses that may run in your family, and various assessments and screenings as appropriate. After reviewing your medical record and screening and assessments performed today your provider may have ordered immunizations, labs, imaging, and/or referrals for you. A list of these orders (if applicable) as well as your Preventive Care list are included within your After Visit Summary for your review. Other Preventive Recommendations:    · A preventive eye exam performed by an eye specialist is recommended every 1-2 years to screen for glaucoma; cataracts, macular degeneration, and other eye disorders. · A preventive dental visit is recommended every 6 months. · Try to get at least 150 minutes of exercise per week or 10,000 steps per day on a pedometer . · Order or download the FREE \"Exercise & Physical Activity: Your Everyday Guide\" from [employer]. Call 6-483.733.5732 or search [employer] online. · You need 5220-0213 mg of calcium and 5166-3237 IU of vitamin D per day. It is possible to meet your calcium requirement with diet alone, but a vitamin D supplement is usually necessary to meet this goal.  · When exposed to the sun, use a sunscreen that protects against both UVA and UVB radiation with an SPF of 30 or greater. Reapply every 2 to 3 hours or after sweating, drying off with a towel, or swimming. · Always wear a seat belt when traveling in a car. Always wear a helmet when riding a bicycle or motorcycle.     Keep Your Memory Charoletmaira Phillips       Many factors can affect your ability to remembera hectic lifestyle, aging, stress, chronic disease, and certain medicines. But, there are steps you can take to sharpen your mind and help preserve your memory. Challenge Your Brain   Regularly challenging your mind may help keeps it in top shape. Good mental exercises include:   Crossword puzzlesUse a dictionary if you need it; you will learn more that way. Brainteasers Try some! Crafts, such as wood working and sewing   Hobbies, such as gardening and building model airplanes   SocializingVisit old friends or join groups to meet new ones. Reading   Learning a new language   Taking a class, whether it be art history or liliana chi   TravelingExperience the food, history, and culture of your destination   Learning to use a computer   Going to museums, the theater, or thought-provoking movies   Changing things in your daily life, such as reversing your pattern in the grocery store or brushing your teeth using your nondominant hand   Use Memory Aids   There is no need to remember every detail on your own. These memory aids can help:   Calendars and day planners   Electronic organizers to store all sorts of helpful informationThese devices can \"beep\" to remind you of appointments. A book of days to record birthdays, anniversaries, and other occasions that occur on the same date every year   Detailed \"to-do\" lists and strategically placed sticky notes   Quick \"study\" sessionsBefore a gathering, review who will be there so their names will be fresh in your mind. Establish routinesFor example, keep your keys, wallet, and umbrella in the same place all the time or take medicine with your 8:00 AM glass of juice   Live a Healthy Life   Many actions that will keep your body strong will do the same for your mind. For example:   Talk to Your Doctor About Herbs and Supplements    Malnutrition and vitamin deficiencies can impair your mental function. For example, vitamin B12 deficiency can cause a range of symptoms, including confusion.  But, what if your nutritional needs are being met? Can herbs and supplements still offer a benefit? Researchers have investigated a range of natural remedies, such as ginkgo , ginseng , and the supplement phosphatidylserine (PS). So far, though, the evidence is inconsistent as to whether these products can improve memory or thinking. If you are interested in taking herbs and supplements, talk to your doctor first because they may interact with other medicines that you are taking. Exercise Regularly    Among the many benefits of regular exercise are increased blood flow to the brain and decreased risk of certain diseases that can interfere with memory function. One study found that even moderate exercise has a beneficial effect. Examples of \"moderate\" exercise include:   Playing 18 holes of golf once a week, without a cart   Playing tennis twice a week   Walking one mile per day   Manage Stress    It can be tough to remember what is important when your mind is cluttered. Make time for relaxation. Choose activities that calm you down, and make it routine. Manage Chronic Conditions    Side effects of high blood pressure , diabetes, and heart disease can interfere with mental function. Many of the lifestyle steps discussed here can help manage these conditions. Strive to eat a healthy diet, exercise regularly, get stress under control, and follow your doctor's advice for your condition. Minimize Medications    Talk to your doctor about the medicines that you take. Some may be unnecessary. Also, healthy lifestyle habits may lower the need for certain drugs. Last Reviewed: April 2010 Ruma Hawkins MD   Updated: 4/13/2010   ·     [address]       As we get older, changes in balance, gait, strength, vision, hearing, and cognition make even the most youthful senior more prone to accidents. Falls are one of the leading health risks for older people.  This increased risk of falling is related to:   Aging process (eg, decreased muscle strength, slowed reflexes)   Higher incidence of chronic health problems (eg, arthritis, diabetes) that may limit mobility, agility or sensory awareness   Side effects of medicine (eg, dizziness, blurred vision)especially medicines like prescription pain medicines and drugs used to treat mental health conditions   Depending on the brittleness of your bones, the consequences of a fall can be serious and long lasting. Home Life   Research by the Association of Aging [facility]) shows that some home accidents among older adults can be prevented by making simple lifestyle changes and basic modifications and repairs to the home environment. Here are some lifestyle changes that experts recommend:   Have your hearing and vision checked regularly. Be sure to wear prescription glasses that are right for you. Speak to your doctor or pharmacist about the possible side effects of your medicines. A number of medicines can cause dizziness. If you have problems with sleep, talk to your doctor. Limit your intake of alcohol. If necessary, use a cane or walker to help maintain your balance. Wear supportive, rubber-soled shoes, even at home. If you live in a region that gets wintry weather, you may want to put special cleats on your shoes to prevent you from slipping on the snow and ice. Exercise regularly to help maintain muscle tone, agility, and balance. Always hold the banister when going up or down stairs. Also, use  bars when getting in or out of the bath or shower, or using the toilet. To avoid dizziness, get up slowly from a lying down position. Sit up first, dangling your legs for a minute or two before rising to a standing position. Overall Home Safety Check   According to the Consumer Product Safety Commision's \"Older Consumer Home Safety Checklist,\" it is important to check for potential hazards in each room. And remember, proper lighting is an essential factor in home safety.  If you cannot see clearly, you are more likely to fall. Important questions to ask yourself include:   Are lamp, electric, extension, and telephone cords placed out of the flow of traffic and maintained in good condition? Have frayed cords been replaced? Are all small rugs and runners slip resistant? If not, you can secure them to the floor with a special double-sided carpet tape. Are smoke detectors properly locatedone on every floor of your home and one outside of every sleeping area? Are they in good working order? Are batteries replaced at least once a year? Do you have a well-maintained carbon monoxide detector outside every sleeping are in your home? Does your furniture layout leave plenty of space to maneuver between and around chairs, tables, beds, and sofas? Are hallways, stairs and passages between rooms well lit? Can you reach a lamp without getting out of bed? Are floor surfaces well maintained? Shag rugs, high-pile carpeting, tile floors, and polished wood floors can be particularly slippery. Stairs should always have handrails and be carpeted or fitted with a non-skid tread. Is your telephone easily reachable. Is the cord safely tucked away? Room by Room   According to the Association of Aging, bathrooms and john are the two most potentially hazardous rooms in your home. In the Kitchen    Be sure your stove is in proper working order and always make sure burners and the oven are off before you go out or go to sleep. Keep pots on the back burners, turn handles away from the front of the stove, and keep stove clean and free of grease build-up. Kitchen ventilation systems and range exhausts should be working properly. Keep flammable objects such as towels and pot holders away from the cooking area except when in use. Make sure kitchen curtains are tied back. Move cords and appliances away from the sink and hot surfaces.  If extension cords are needed, install wiring guides so they do not hang over the sink, range, or working areas. Look for coffee pots, kettles and toaster ovens with automatic shut-offs. Keep a mop handy in the kitchen so you can wipe up spills instantly. You should also have a small fire extinguisher. Arrange your kitchen with frequently used items on lower shelves to avoid the need to stand on a stepstool to reach them. Make sure countertops are well-lit to avoid injuries while cutting and preparing food. In the Bathroom    Use a non-slip mat or decals in the tub and shower, since wet, soapy tile or porcelain surfaces are extremely slippery. Make sure bathroom rugs are non-skid or tape them firmly to the floor. Bathtubs should have at least one, preferably two, grab bars, firmly attached to structural supports in the wall. (Do not use built-in soap holders or glass shower doors as grab bars.)    Tub seats fitted with non-slip material on the legs allow you to wash sitting down. For people with limited mobility, bathtub transfer benches allow you to slide safely into the tub. Raised toilet seats and toilet safety rails are helpful for those with knee or hip problems. In the [facility]    Make sure you use a nightlight and that the area around your bed is clear of potential obstacles. Be careful with electric blankets and never go to sleep with a heating pad, which can cause serious burns even if on a low setting. Use fire-resistant mattress covers and pillows, and NEVER smoke in bed. Keep a phone next to the bed that is programmed to dial 911 at the push of a button. If you have a chronic condition, you may want to sign on with an automatic call-in service. Typically the system includes a small pendant that connects directly to an emergency medical voice-response system. You should also make arrangements to stay in contact with someonefriend, neighbor, family memberon a regular schedule.    Fire Prevention   According to the [employer] (Smoke Alarms for Every) [address] [facility], senior citizens are one of the two highest risk groups for death and serious injuries due to residential fires. When cooking, wear short-sleeved items, never a bulky long-sleeved robe. The [facility]'s Safety Checklist for Older Consumers emphasizes the importance of checking basements, garages, workshops and storage areas for fire hazards, such as volatile liquids, piles of old rags or clothing and overloaded circuits. Never smoke in bed or when lying down on a couch or recliner chair. Small portable electric or kerosene heaters are responsible for many home fires and should be used cautiously if at all. If you do use one, be sure to keep them away from flammable materials. In case of fire, make sure you have a pre-established emergency exit plan. Have a professional check your fireplace and other fuel-burning appliances yearly. [employer]   Baby boomers entering the alexandra years will continue to see the development of new products to help older adults live safely and independently in spite of age-related changes. Making Life More Livable  , by Lory [employer], lists over 1,000 products for \"living well in the mature years,\" such as bathing and mobility aids, household security devices, ergonomically designed knives and peelers, and faucet valves and knobs for temperature control. Medical supply stores and organizations are good sources of information about products that improve your quality of life and insure your safety.      Last Reviewed: November 2009 Julio Vital MD   Updated: 3/7/2011     ·

## 2020-12-21 RX ORDER — LEVOTHYROXINE SODIUM 0.05 MG/1
TABLET ORAL
Qty: 90 TABLET | Refills: 3 | Status: SHIPPED | OUTPATIENT
Start: 2020-12-21 | End: 2022-02-04

## 2021-01-05 ENCOUNTER — VIRTUAL VISIT (OUTPATIENT)
Dept: DERMATOLOGY | Age: 81
End: 2021-01-05
Payer: MEDICARE

## 2021-01-05 DIAGNOSIS — L40.9 SCALP PSORIASIS: ICD-10-CM

## 2021-01-05 DIAGNOSIS — L40.0 PLAQUE PSORIASIS: Primary | ICD-10-CM

## 2021-01-05 PROCEDURE — 99442 PR PHYS/QHP TELEPHONE EVALUATION 11-20 MIN: CPT | Performed by: DERMATOLOGY

## 2021-01-05 RX ORDER — FLUOCINONIDE TOPICAL SOLUTION USP, 0.05% 0.5 MG/ML
SOLUTION TOPICAL
Qty: 60 ML | Refills: 3 | Status: SHIPPED | OUTPATIENT
Start: 2021-01-05 | End: 2021-04-30 | Stop reason: SDUPTHER

## 2021-01-05 NOTE — PROGRESS NOTES
Jonel Dance is a [de-identified] y.o. female evaluated via telephone on 1/5/2021. Consent:  She and/or health care decision maker is aware that that she may receive a bill for this telephone service, depending on her insurance coverage, and has provided verbal consent to proceed: Yes      I affirm this is a Patient Initiated Episode with a Patient who has not had a related appointment within my department in the past 7 days or scheduled within the next 24 hours. Patient identification was verified at the start of the visit: Yes         Details of this discussion including any medical advice provided:     Patient's Name: Jonel Dance  MRN: <>  YOB: 1940  Date of Visit: 1/5/2021  Primary Care Provider: Salvador Liao MD  Referring Provider: No ref. provider found    Subjective:   Chief Complaint:   Follow-up Lucia Motta.)      History of Present Illness:   Jonel Dance is a [de-identified] y.o. female who presents for a follow up on psoriasis. Last office visit: 12/7/20  Areas affected include scalp. Current treatments include otezla 30 mg once daily with marked improvement of her diarrhea. Patient states her scalp is clear from any patches, however she continues to be itchy at times. She denies any recent URI, weight loss at time. At their last visit they were instructed to lower their dose of otezla to 30 mg once daily to help mitigate the diarrhea and nausea she has been experiencing. Past medical and surgical histories, current medications, allergies, social and family histories were reviewed with no change since the last visit        Allergies:   Allergies   Allergen Reactions    Bacitracin        Current Medications:  Current Outpatient Medications   Medication Sig Dispense Refill    levothyroxine (SYNTHROID) 50 MCG tablet TAKE 1 TABLET BY MOUTH EVERY DAY 90 tablet 3    atorvastatin (LIPITOR) 10 MG tablet TAKE 1 TABLET DAILY 90 tablet 3    fluticasone (FLONASE) 50 MCG/ACT nasal spray SPRAY 1 SPRAY INTO EACH NOSTRIL EVERY DAY 12 g 3    escitalopram (LEXAPRO) 10 MG tablet TAKE 1 TABLET DAILY 90 tablet 3    ketoconazole (NIZORAL) 2 % shampoo Apply shampoo three times weekly (M, W, F) leave in 3-5 min, then rinse. May alternate with other shampoos 120 mL 11    OTEZLA 30 MG TABS Take 30 mg by mouth 2 times daily      clobetasol (TEMOVATE) 0.05 % cream Apply to affected area twice daily 60 g 2    Misc Natural Products (OSTEO BI-FLEX JOINT SHIELD PO) Take by mouth + VITAMIN D      tolterodine (DETROL LA) 4 MG ER capsule Take 4 mg by mouth daily.  timolol (BETIMOL) 0.5 % ophthalmic solution 1 drop 2 times daily.  fluocinonide (LIDEX) 0.05 % external solution Apply to scalp twice daily for two weeks, then taper to as needed with flares. (Patient not taking: Reported on 1/5/2021) 60 mL 3    ibandronate (BONIVA) 150 MG tablet Take 150 mg by mouth every 30 days Take 1 tablet every 30 days. No current facility-administered medications for this visit. Review of Systems:  Constitutional: No fevers, chills or recent illness. Skin: Skin:As per HPI AND otherwise no new, bleeding or symptomatic skin lesions      Objective: There were no vitals filed for this visit. Physical Examination:  Was not performed given the nature of the visit via telephone    Assessment and Plan (with relevant objective exam findings):     1. Plaque/ scalp psoriasis  - Will continue with current dosing of Otezla 30 mg once daily Prescription to be sent to Specialty pharmacy  - Patient will initiate treatment with Lidex solution twice daily to scalp for two weeks, or until resolved      I spent a minimum of 20 minutes interacting with this patient. More than 50% of this time was spent counseling and coordinating care for the patient regarding their disease(s) and therapies.  This included reviewing the patients chief complaint, reviewing and clarifying the patients present illness, reviewing and clarifying the patients past medical history, reviewing and clarifying the patients review of systems/social history/family history, reviewing and clarifying the patients current medications/drug allergies, discussing possible treatment strategies with the patient, discussing my specific treatment recommendations, discussing possible side effects of the recommended treatment(s), discussing strategies for avoiding/minimizing treatment side effects, discussing the patients prognosis, and arranging for follow-up. Follow up:  Return visit in 3 months or as needed for change in condition. All questions addressed.      Procedure:   No procedure performed    Total Time: minutes: 11-20 minutes      Ivan Raines MD. MS

## 2021-01-25 NOTE — TELEPHONE ENCOUNTER
Phone call from patient stating she has not received her otezla, and has been out of it x 5 days  She said it was supposed to go to a specialty pharmacy and gave this info ( which she cant find now ) at her last office visit. She believes it is [facility]       Can we give her a starter pack ?

## 2021-02-03 ENCOUNTER — TELEPHONE (OUTPATIENT)
Dept: DERMATOLOGY | Age: 81
End: 2021-02-03

## 2021-02-04 ENCOUNTER — VIRTUAL VISIT (OUTPATIENT)
Dept: DERMATOLOGY | Age: 81
End: 2021-02-04
Payer: MEDICARE

## 2021-02-04 DIAGNOSIS — L40.0 PLAQUE PSORIASIS: Primary | ICD-10-CM

## 2021-02-04 PROCEDURE — 99443 PR PHYS/QHP TELEPHONE EVALUATION 21-30 MIN: CPT | Performed by: DERMATOLOGY

## 2021-02-04 NOTE — PROGRESS NOTES
Antony Mcleod is a [de-identified] y.o. female evaluated via telephone on 2/4/2021. Consent:  She and/or health care decision maker is aware that that she may receive a bill for this telephone service, depending on her insurance coverage, and has provided verbal consent to proceed: Yes*      I affirm this is a Patient Initiated Episode with a Patient who has not had a related appointment within my department in the past 7 days or scheduled within the next 24 hours. Patient identification was verified at the start of the visit: Yes    Patient's Name: Antony Mcleod  MRN: <>  YOB: 1940  Date of Visit: 2/4/2021  Primary Care Provider: Shawna Garcia MD  Referring Provider: No ref. provider found    Subjective:   Chief Complaint:   Follow-up (psoriasis)    History of Present Illness:   Antony Mcleod is an [de-identified] y.o. female who presents today for a follow up on psoriasis. Last office visit: 12/7/20    At her last visit her scalp psoriasis was under good control, and her diarrhea had resolved, as such the decision was made to continue with 30 mg Otezla once daily. Patient reports her scalp has recently flared and the scaling, redness has extended to her forehead. She has been using CeraVe and seldomly uses lidex solution, as she feels it makes her scalp oily. She reports tolerating once daily dosing of Otezla with no diarrhea currently. Denies any other lesions on her body. Past medical and surgical histories, current medications, allergies, social and family histories were reviewed with no change since the last visit        Allergies:   Allergies   Allergen Reactions    Bacitracin        Current Medications:  Current Outpatient Medications   Medication Sig Dispense Refill    Apremilast 30 MG TABS Take 30 mg by mouth 2 times daily 60 tablet 3    levothyroxine (SYNTHROID) 50 MCG tablet TAKE 1 TABLET BY MOUTH EVERY DAY 90 tablet 3    atorvastatin

## 2021-04-12 ENCOUNTER — TELEPHONE (OUTPATIENT)
Dept: DERMATOLOGY | Age: 81
End: 2021-04-12

## 2021-04-12 NOTE — TELEPHONE ENCOUNTER
[facility] is requesting  documentation for diagnosis faxed to [phone]. Please advise. Thank you!

## 2021-04-19 ENCOUNTER — OFFICE VISIT (OUTPATIENT)
Dept: FAMILY MEDICINE CLINIC | Age: 81
End: 2021-04-19
Payer: MEDICARE

## 2021-04-19 VITALS
BODY MASS INDEX: 24.24 KG/M2 | OXYGEN SATURATION: 98 % | HEART RATE: 60 BPM | SYSTOLIC BLOOD PRESSURE: 134 MMHG | TEMPERATURE: 97.1 F | WEIGHT: 141.2 LBS | DIASTOLIC BLOOD PRESSURE: 80 MMHG

## 2021-04-19 DIAGNOSIS — R09.82 POST-NASAL DISCHARGE: ICD-10-CM

## 2021-04-19 DIAGNOSIS — G31.84 MILD COGNITIVE IMPAIRMENT: ICD-10-CM

## 2021-04-19 DIAGNOSIS — R53.83 FATIGUE, UNSPECIFIED TYPE: ICD-10-CM

## 2021-04-19 DIAGNOSIS — E05.00 GRAVES DISEASE: ICD-10-CM

## 2021-04-19 DIAGNOSIS — N32.81 OVERACTIVE BLADDER: ICD-10-CM

## 2021-04-19 DIAGNOSIS — F41.8 SITUATIONAL ANXIETY: ICD-10-CM

## 2021-04-19 DIAGNOSIS — R25.2 LEG CRAMPS: Primary | ICD-10-CM

## 2021-04-19 DIAGNOSIS — E78.2 MIXED HYPERLIPIDEMIA: ICD-10-CM

## 2021-04-19 DIAGNOSIS — E55.9 VITAMIN D DEFICIENCY: ICD-10-CM

## 2021-04-19 DIAGNOSIS — L40.9 PSORIASIS: ICD-10-CM

## 2021-04-19 DIAGNOSIS — F32.9 REACTIVE DEPRESSION: ICD-10-CM

## 2021-04-19 LAB
BASOPHILS ABSOLUTE: 0.1 K/UL (ref 0–0.2)
BASOPHILS RELATIVE PERCENT: 1.1 %
CHOLESTEROL, TOTAL: 157 MG/DL (ref 0–199)
EOSINOPHILS ABSOLUTE: 0 K/UL (ref 0–0.6)
EOSINOPHILS RELATIVE PERCENT: 0.7 %
HCT VFR BLD CALC: 40.2 % (ref 36–48)
HDLC SERPL-MCNC: 83 MG/DL (ref 40–60)
HEMOGLOBIN: 13 G/DL (ref 12–16)
LDL CHOLESTEROL CALCULATED: 52 MG/DL
LYMPHOCYTES ABSOLUTE: 1 K/UL (ref 1–5.1)
LYMPHOCYTES RELATIVE PERCENT: 19.5 %
MCH RBC QN AUTO: 29.9 PG (ref 26–34)
MCHC RBC AUTO-ENTMCNC: 32.3 G/DL (ref 31–36)
MCV RBC AUTO: 92.5 FL (ref 80–100)
MONOCYTES ABSOLUTE: 0.5 K/UL (ref 0–1.3)
MONOCYTES RELATIVE PERCENT: 8.8 %
NEUTROPHILS ABSOLUTE: 3.7 K/UL (ref 1.7–7.7)
NEUTROPHILS RELATIVE PERCENT: 69.9 %
PDW BLD-RTO: 14.3 % (ref 12.4–15.4)
PLATELET # BLD: 220 K/UL (ref 135–450)
PMV BLD AUTO: 9.4 FL (ref 5–10.5)
RBC # BLD: 4.35 M/UL (ref 4–5.2)
TRIGL SERPL-MCNC: 111 MG/DL (ref 0–150)
TSH REFLEX: 3.84 UIU/ML (ref 0.27–4.2)
VLDLC SERPL CALC-MCNC: 22 MG/DL
WBC # BLD: 5.3 K/UL (ref 4–11)

## 2021-04-19 PROCEDURE — 36415 COLL VENOUS BLD VENIPUNCTURE: CPT | Performed by: FAMILY MEDICINE

## 2021-04-19 PROCEDURE — 1036F TOBACCO NON-USER: CPT | Performed by: FAMILY MEDICINE

## 2021-04-19 PROCEDURE — 4040F PNEUMOC VAC/ADMIN/RCVD: CPT | Performed by: FAMILY MEDICINE

## 2021-04-19 PROCEDURE — 1090F PRES/ABSN URINE INCON ASSESS: CPT | Performed by: FAMILY MEDICINE

## 2021-04-19 PROCEDURE — 99214 OFFICE O/P EST MOD 30 MIN: CPT | Performed by: FAMILY MEDICINE

## 2021-04-19 PROCEDURE — G8399 PT W/DXA RESULTS DOCUMENT: HCPCS | Performed by: FAMILY MEDICINE

## 2021-04-19 PROCEDURE — G8420 CALC BMI NORM PARAMETERS: HCPCS | Performed by: FAMILY MEDICINE

## 2021-04-19 PROCEDURE — G8427 DOCREV CUR MEDS BY ELIG CLIN: HCPCS | Performed by: FAMILY MEDICINE

## 2021-04-19 PROCEDURE — 1123F ACP DISCUSS/DSCN MKR DOCD: CPT | Performed by: FAMILY MEDICINE

## 2021-04-19 RX ORDER — LORATADINE 10 MG/1
10 TABLET ORAL DAILY
Qty: 90 TABLET | Refills: 3 | Status: SHIPPED | OUTPATIENT
Start: 2021-04-19 | End: 2022-06-28 | Stop reason: ALTCHOICE

## 2021-04-19 RX ORDER — APREMILAST 30 MG/1
TABLET, FILM COATED ORAL
COMMUNITY
End: 2021-04-30 | Stop reason: SDUPTHER

## 2021-04-19 NOTE — PROGRESS NOTES
Chief Complaint   Patient presents with    Hyperlipidemia    Psoriasis    Other     leg cramping       Wt Readings from Last 3 Encounters:   21 141 lb 3.2 oz (64 kg)   20 140 lb (63.5 kg)   10/14/20 140 lb 12.8 oz (63.9 kg)     BP Readings from Last 3 Encounters:   21 134/80   10/14/20 124/62   03/10/20 (!) 144/76      No results found for: LABA1C    HPI:  Gabbie Thapa is a [de-identified] y.o. (: 1940) here today for    Psoriasis: patient did see the dermatologist and she was started on otezla which she states has been helping, but states she feels it has caused her some diarrhea so she feels the benefit outweighs this side effect so she is continuing it. Leg cramps: she states that she continues to have leg cramps. She tried the tonic water but it caused her to have an upset stomach. She also tried using an OTC gel that was supposed to help with the leg cramps it didn't either.  states that patient does not drink any water and thinks this is the cause of her issue. Explained that because all of her electrolytes were in good range it would seem that this is really due to not drinking enough water. Encouraged her to drink more water daily. Nasal drainage: patient states that she thought she was supposed to take the flonase for two weeks then wean off so she has only been taking the flonase once or twice a week as needed. Explained that she was supposed to stay on the Flonase daily. Appears she also didn't start a OTC allergy medication so has sent in [facility] for patient. She states she has a lot of nasal congestion still and it drains all the time. Also brought up that she has been having some worsening short term memory. Forgetting all the time where she placed her phone or losing her keys. This has been discussed in prior appointments.  None of the past lab work that has been done identified a cause of her memory issue so feel there is nothing to help with it at this point.    [] Patient has completed an advance directive  [x] Patient has NOT completed an advanced directive  [] Patient has a documented healthcare surrogate  [x] Patient does NOT have a documented healthcare surrogate  [] Discussed the importance of establishing and updating an advanced directive. Patient has questions at this time and those were answered. [x] Discussed the importance of establishing and updating an advanced directive. Patient does NOT have questions at this time. Discussed with: [x] Patient            [] Family             [] Other caregiver    Patient's medications, allergies, past medical, surgical, social and family histories were reviewed and updated asappropriate on 4/19/2021 at 10:48 AM.    ROS:  Review of Systems    All other systems reviewed and are negative except as noted above on 4/19/2021 at 10:48 AM. Additional review of systems may be scanned into the media section ofthis medical record. Any responses requiring further intervention were pursued.     Past Medical History:   Diagnosis Date    Closed nondisplaced fracture of distal phalanx of lesser toe of left foot 6/14/2017    DDD (degenerative disc disease)     Grave's disease     Hyperlipidemia     Lichen planus     Overactive bladder     Psoriasis     Radiculopathy      Family History   Problem Relation Age of Onset    Heart Disease Mother     Heart Disease Father      Social History     Socioeconomic History    Marital status:      Spouse name: Not on file    Number of children: Not on file    Years of education: Not on file    Highest education level: Not on file   Occupational History    Not on file   Social Needs    Financial resource strain: Not on file    Food insecurity     Worry: Not on file     Inability: Not on file    Transportation needs     Medical: Not on file     Non-medical: Not on file   Tobacco Use    Smoking status: Former Smoker     Packs/day: 0.15     Years: 5.00     Pack years: tolterodine (DETROL LA) 4 MG ER capsule Take 4 mg by mouth daily. Yes Historical Provider, MD   timolol (BETIMOL) 0.5 % ophthalmic solution 1 drop 2 times daily. Yes Historical Provider, MD   fluocinonide (LIDEX) 0.05 % external solution Apply to scalp twice daily for two weeks, then taper to as needed with flares. Patient not taking: Reported on 2/4/2021  South Gandhi MD   ibandronate (BONIVA) 150 MG tablet Take 150 mg by mouth every 30 days Take 1 tablet every 30 days. Historical Provider, MD     Allergies   Allergen Reactions    Bacitracin        OBJECTIVE:  Estimated body mass index is 24.24 kg/m² as calculated from the following:    Height as of 12/8/20: 5' 4\" (1.626 m). Weight as of this encounter: 141 lb 3.2 oz (64 kg). Vitals:    04/19/21 1040   BP: 134/80   Pulse: 60   Temp: 97.1 °F (36.2 °C)   TempSrc: Temporal   SpO2: 98%   Weight: 141 lb 3.2 oz (64 kg)       Physical Exam  Vitals signs and nursing note reviewed. Constitutional:       General: She is not in acute distress. Appearance: She is well-developed. She is not diaphoretic. HENT:      Head: Normocephalic and atraumatic. Right Ear: External ear normal.      Left Ear: External ear normal.      Nose: Nose normal.   Eyes:      General: Lids are normal. No scleral icterus. Right eye: No discharge. Left eye: No discharge. Pupils: Pupils are equal, round, and reactive to light. Neck:      Thyroid: No thyromegaly. Vascular: No JVD. Cardiovascular:      Rate and Rhythm: Normal rate and regular rhythm. Heart sounds: Normal heart sounds. Pulmonary:      Effort: Pulmonary effort is normal. No respiratory distress. Breath sounds: Normal breath sounds. Abdominal:      Palpations: Abdomen is soft. There is no hepatomegaly or splenomegaly. Tenderness: There is no abdominal tenderness. Musculoskeletal:      Right lower leg: No edema. Left lower leg: No edema.    Skin:     General: Skin is warm and dry. Coloration: Skin is not pale. Findings: Bruising (right wrist) present. No erythema or rash. Comments: Turgor normal   Neurological:      Mental Status: She is oriented to person, place, and time. Psychiatric:         Mood and Affect: Mood normal.         Behavior: Behavior normal.         Thought Content: Thought content normal.         Judgment: Judgment normal.              ASSESSMENT PLAN      Diagnosis Orders   1. Leg cramps     2. Graves disease  TSH with Reflex   3. Fatigue, unspecified type  CBC WITH AUTO DIFFERENTIAL   4. Mixed hyperlipidemia  LIPID PANEL   5. Vitamin D deficiency  VITAMIN D 25 HYDROXY   6. Post-nasal discharge  loratadine (CLARITIN) 10 MG tablet   7. Psoriasis     8. Overactive bladder     9. Reactive depression     10. Situational anxiety     11. Mild cognitive impairment     Magnesium level 6 months ago was okay, actually 2.5 without supplement. BMP was acceptable.  states that she does not drink enough fluid and with that I would agree. Asked her to try to get more fluid in. No evidence of hyperthyroidism. Fatigue at baseline. Her memory deficits at baseline. She had mixed up the directions of steroid cream and steroid spray so she has not been using the Flonase, therefore, she has the postnasal drainage and dry cough. Resume Flonase routinely and add Claritin. Continue lipid monitoring as needed. Vitamin D levels will be monitored as needed Luis Carlos Dadds helps her psoriasis but she states if she uses it daily she gets diarrhea so she has modified the dosing. Urinating about the same. Nursing be fine. Follow-up 6 months    Patient should call the office immediately with new or ongoing signs or symptoms or worsening, or proceed to the emergency room. No changes in past medical history, past surgical history, social history, or family history were noted during the patient encounter unless specifically listed above.   All updates of past medical history, past surgical history, social history, or family history were reviewed personally by me during the office visit. All problems listed in the assessment are stable unless noted otherwise. Medication profile reviewed personally by me during the visit. Medication side effects and possible impairments from medications were discussed as applicable. This document was prepared by a combination of typing and transcription through a voice recognition software. Scribe attestation: Eber Yañez RN, am scribing for and in the presence of Aime Xie MD. Electronically signed by Kiko Mckinley RN on 4/19/2021 at 10:48 AM      Provider attestation:     I, Dr. Benedict Oquendo, personally performed the services described in this documentation, as scribed by the above signed scribe in my presence, and it is both accurate and complete. I agree with the ROS and Past Histories independently gathered by the clinical support staff and the remaining scribed note accurately describes my personal service to the patient.       4/19/2021    11:29 AM

## 2021-04-19 NOTE — TELEPHONE ENCOUNTER
The patient is callling about her Otezla refill, Please call her when it is renewed. She is calling to see if she can get a starter pack? She can stop by today. Ph. [phone] or [phone]    Her  could even pick it up tomorrow if that is ok?

## 2021-04-20 LAB — VITAMIN D 25-HYDROXY: 41.8 NG/ML

## 2021-04-20 NOTE — TELEPHONE ENCOUNTER
Spoke to pt and rescheduled for 4/30/21 she is not able to make it tomorrow, she has another appt at 6

## 2021-04-20 NOTE — TELEPHONE ENCOUNTER
Patient callled again. [employer] delivered otezla yesterday. She no longer needs refills or virtual visit this week. Do you still want her to keep virtual visit this week? Let her know if she needs virtual visit.  [phone] is calll back number for today.

## 2021-04-30 ENCOUNTER — OFFICE VISIT (OUTPATIENT)
Dept: DERMATOLOGY | Age: 81
End: 2021-04-30
Payer: MEDICARE

## 2021-04-30 VITALS — TEMPERATURE: 97.2 F

## 2021-04-30 DIAGNOSIS — L81.4 SOLAR LENTIGINOSIS: ICD-10-CM

## 2021-04-30 DIAGNOSIS — L57.8 ACTINODERMATOSIS: ICD-10-CM

## 2021-04-30 DIAGNOSIS — L40.9 SCALP PSORIASIS: ICD-10-CM

## 2021-04-30 DIAGNOSIS — L40.0 PLAQUE PSORIASIS: Primary | ICD-10-CM

## 2021-04-30 PROCEDURE — G8399 PT W/DXA RESULTS DOCUMENT: HCPCS | Performed by: DERMATOLOGY

## 2021-04-30 PROCEDURE — 1090F PRES/ABSN URINE INCON ASSESS: CPT | Performed by: DERMATOLOGY

## 2021-04-30 PROCEDURE — 1036F TOBACCO NON-USER: CPT | Performed by: DERMATOLOGY

## 2021-04-30 PROCEDURE — G8427 DOCREV CUR MEDS BY ELIG CLIN: HCPCS | Performed by: DERMATOLOGY

## 2021-04-30 PROCEDURE — G8420 CALC BMI NORM PARAMETERS: HCPCS | Performed by: DERMATOLOGY

## 2021-04-30 PROCEDURE — 1123F ACP DISCUSS/DSCN MKR DOCD: CPT | Performed by: DERMATOLOGY

## 2021-04-30 PROCEDURE — 99214 OFFICE O/P EST MOD 30 MIN: CPT | Performed by: DERMATOLOGY

## 2021-04-30 PROCEDURE — 4040F PNEUMOC VAC/ADMIN/RCVD: CPT | Performed by: DERMATOLOGY

## 2021-04-30 RX ORDER — KETOCONAZOLE 20 MG/ML
SHAMPOO TOPICAL
Qty: 120 ML | Refills: 11 | Status: SHIPPED | OUTPATIENT
Start: 2021-04-30 | End: 2021-12-09 | Stop reason: SDUPTHER

## 2021-04-30 RX ORDER — APREMILAST 30 MG/1
30 TABLET, FILM COATED ORAL 2 TIMES DAILY
Qty: 60 TABLET | Refills: 5 | Status: SHIPPED | OUTPATIENT
Start: 2021-04-30 | End: 2022-03-16 | Stop reason: SDUPTHER

## 2021-04-30 RX ORDER — FLUOCINONIDE TOPICAL SOLUTION USP, 0.05% 0.5 MG/ML
SOLUTION TOPICAL
Qty: 60 ML | Refills: 3 | Status: SHIPPED | OUTPATIENT
Start: 2021-04-30 | End: 2021-12-09 | Stop reason: SDUPTHER

## 2021-04-30 NOTE — PROGRESS NOTES
Patient's Name: George Sanchez  MRN: <>  YOB: 1940  Date of Visit: 4/30/2021  Primary Care Provider: Doug Cobos MD  Referring Provider: No ref. provider found    Subjective:     Chief Complaint   Patient presents with    Follow-up     feels like she getting to dry,flaky, and itchy  with otezla which also caused diarrhea says patches have improved        Patient presents today for follow up of psoriasis. Since last visit, the patient has been treating their psoriasis with Yara Horner. She does report some scaling and itch, but not unbearable. She reports taking Otezla 30 mg twice daily, except for the past two days, she only took 30 mg once. She denies nausea or diarrhea, however she has been taking antidiarrheal medication. She has not been using Lidex, as it makes her hair greasy. Nausea/vomiting: No  URI: No  Diarrhea: No    Patient does not not have a history of psoriatic arthritis. Patient does nothave joint pain/stiffness, morning stiffness, joint swelling/redness       Since her last visit, there have been no changes to her medical/family history, allergies or medications. Allergies: Allergies   Allergen Reactions    Bacitracin        Current Medications:  Current Outpatient Medications   Medication Sig Dispense Refill    Apremilast (OTEZLA) 30 MG TABS Take by mouth      loratadine (CLARITIN) 10 MG tablet Take 1 tablet by mouth daily 90 tablet 3    levothyroxine (SYNTHROID) 50 MCG tablet TAKE 1 TABLET BY MOUTH EVERY DAY 90 tablet 3    atorvastatin (LIPITOR) 10 MG tablet TAKE 1 TABLET DAILY 90 tablet 3    fluticasone (FLONASE) 50 MCG/ACT nasal spray SPRAY 1 SPRAY INTO EACH NOSTRIL EVERY DAY 12 g 3    escitalopram (LEXAPRO) 10 MG tablet TAKE 1 TABLET DAILY 90 tablet 3    ketoconazole (NIZORAL) 2 % shampoo Apply shampoo three times weekly (M, W, F) leave in 3-5 min, then rinse.  May alternate with other shampoos 120 mL 11    Misc Natural Products (OSTEO BI-FLEX JOINT SHIELD PO) Take by mouth + VITAMIN D      ibandronate (BONIVA) 150 MG tablet Take 150 mg by mouth every 30 days Take 1 tablet every 30 days.  tolterodine (DETROL LA) 4 MG ER capsule Take 4 mg by mouth daily.  timolol (BETIMOL) 0.5 % ophthalmic solution 1 drop 2 times daily.  fluocinonide (LIDEX) 0.05 % external solution Apply to scalp twice daily for two weeks, then taper to as needed with flares. (Patient not taking: Reported on 2/4/2021) 60 mL 3    clobetasol (TEMOVATE) 0.05 % cream Apply to affected area twice daily (Patient not taking: Reported on 4/30/2021) 60 g 2     No current facility-administered medications for this visit. Review of Systems:  Constitutional: No fevers, chills or recent illness. feels well   Skin: Skin:As per HPI AND otherwise no new, bleeding or symptomatic skin lesions      Objective:     Vitals:    04/30/21 1055   Temp: 97.2 °F (36.2 °C)       Physical Examination:  General: alert, comfortable, no apparent distress, well-appearing  Psych: alert, oriented and pleasant  Neuro: oriented to person, place, and time  Skin: Areas examined: head including face, lips, conjunctiva and lids, neck, hair/scalp, left hand, right hand and digits and nails      All areas examined were within normal limits except those listed below with the appropriate assessment and plan      LAB RESULTS:       Assessment and Plan (with relevant objective exam findings):   1. Plaque and scalp psoriasis flared  Location/objective findings: parietal extending to frontal and occipital scalp are erythematous scaly plaques    Treatments chosen:  - Continue Otezla 30 mg twice daily. She may try to hold her antidiarrheal  - Fluocinolone 0.01% soln BID to plaques on scalp  - Ketoconazole 2% shampoo at least twice/week. Leave on 3-5 min before rinsing    2.  Solar Lentiginosis    Location: sun exposed areas, most prominently on the dorsal hands, face, forearms, neck, shoulders and upper chest      Objective: Numerous lacy brown macules ranging in size from 2-10 mm diameter. The lentigines seen today are benign in character, caused by the sun, and do not require treatment. However, they do occasionally transform into a malignancy, so the patient needs to monitor for changes. They were educated on what a suspicious change would include, change in size or color, and included education on the ABCDs of melanoma. 3. Actinodermatosis  In all photo-exposed areas there were numerous light brown, lacy, 3-6 mm macules and some mottled hypo- and hyperpigmentation. This was most prominent on the face, anterior chest, and shoulders. The patient was reassured that these changes do not require treatment, but indicated a significant amount of sun damage in the patient's past. The patient was briefly counseled on the importance of sun protective habits and encouraged to be seen for follow up evaluations in the future. ABCDEs of melanoma were reviewed. I spent a minimum of 30 minutes face-to face with the patient today, greater than 50% of which was spent in counseling and/or coordination of care. Follow up:  Return visit in 6 months or as needed for change in condition. All questions addressed.      Procedure:   No procedure performed      Arvin Adrian MD, MS

## 2021-08-07 DIAGNOSIS — F32.9 REACTIVE DEPRESSION: ICD-10-CM

## 2021-08-09 RX ORDER — ESCITALOPRAM OXALATE 10 MG/1
TABLET ORAL
Qty: 90 TABLET | Refills: 3 | Status: SHIPPED | OUTPATIENT
Start: 2021-08-09 | End: 2021-10-05 | Stop reason: SDUPTHER

## 2021-10-04 ENCOUNTER — OFFICE VISIT (OUTPATIENT)
Dept: FAMILY MEDICINE CLINIC | Age: 81
End: 2021-10-04
Payer: MEDICARE

## 2021-10-04 VITALS
DIASTOLIC BLOOD PRESSURE: 86 MMHG | OXYGEN SATURATION: 96 % | WEIGHT: 139 LBS | TEMPERATURE: 97.2 F | BODY MASS INDEX: 23.86 KG/M2 | SYSTOLIC BLOOD PRESSURE: 138 MMHG | HEART RATE: 79 BPM

## 2021-10-04 DIAGNOSIS — E05.00 GRAVES DISEASE: ICD-10-CM

## 2021-10-04 DIAGNOSIS — R25.2 LEG CRAMPS: ICD-10-CM

## 2021-10-04 DIAGNOSIS — G31.84 MILD COGNITIVE IMPAIRMENT: ICD-10-CM

## 2021-10-04 DIAGNOSIS — E78.2 MIXED HYPERLIPIDEMIA: ICD-10-CM

## 2021-10-04 DIAGNOSIS — F03.90 DEMENTIA WITHOUT BEHAVIORAL DISTURBANCE, UNSPECIFIED DEMENTIA TYPE: ICD-10-CM

## 2021-10-04 DIAGNOSIS — F32.9 REACTIVE DEPRESSION: ICD-10-CM

## 2021-10-04 DIAGNOSIS — R41.3 MEMORY LOSS: Primary | ICD-10-CM

## 2021-10-04 DIAGNOSIS — F41.8 SITUATIONAL ANXIETY: ICD-10-CM

## 2021-10-04 DIAGNOSIS — R53.83 FATIGUE, UNSPECIFIED TYPE: ICD-10-CM

## 2021-10-04 DIAGNOSIS — Z23 NEED FOR INFLUENZA VACCINATION: ICD-10-CM

## 2021-10-04 PROCEDURE — G8482 FLU IMMUNIZE ORDER/ADMIN: HCPCS | Performed by: FAMILY MEDICINE

## 2021-10-04 PROCEDURE — 4040F PNEUMOC VAC/ADMIN/RCVD: CPT | Performed by: FAMILY MEDICINE

## 2021-10-04 PROCEDURE — 1123F ACP DISCUSS/DSCN MKR DOCD: CPT | Performed by: FAMILY MEDICINE

## 2021-10-04 PROCEDURE — G8420 CALC BMI NORM PARAMETERS: HCPCS | Performed by: FAMILY MEDICINE

## 2021-10-04 PROCEDURE — 99214 OFFICE O/P EST MOD 30 MIN: CPT | Performed by: FAMILY MEDICINE

## 2021-10-04 PROCEDURE — 1090F PRES/ABSN URINE INCON ASSESS: CPT | Performed by: FAMILY MEDICINE

## 2021-10-04 PROCEDURE — G8399 PT W/DXA RESULTS DOCUMENT: HCPCS | Performed by: FAMILY MEDICINE

## 2021-10-04 PROCEDURE — 36415 COLL VENOUS BLD VENIPUNCTURE: CPT | Performed by: FAMILY MEDICINE

## 2021-10-04 PROCEDURE — 90674 CCIIV4 VAC NO PRSV 0.5 ML IM: CPT | Performed by: FAMILY MEDICINE

## 2021-10-04 PROCEDURE — G8427 DOCREV CUR MEDS BY ELIG CLIN: HCPCS | Performed by: FAMILY MEDICINE

## 2021-10-04 PROCEDURE — G0008 ADMIN INFLUENZA VIRUS VAC: HCPCS | Performed by: FAMILY MEDICINE

## 2021-10-04 PROCEDURE — 1036F TOBACCO NON-USER: CPT | Performed by: FAMILY MEDICINE

## 2021-10-04 RX ORDER — ATORVASTATIN CALCIUM 10 MG/1
TABLET, FILM COATED ORAL
Qty: 90 TABLET | Refills: 3 | Status: SHIPPED | OUTPATIENT
Start: 2021-10-04

## 2021-10-04 RX ORDER — DONEPEZIL HYDROCHLORIDE 5 MG/1
5 TABLET, FILM COATED ORAL NIGHTLY
Qty: 30 TABLET | Refills: 1 | Status: SHIPPED | OUTPATIENT
Start: 2021-10-04 | End: 2021-10-27

## 2021-10-04 SDOH — ECONOMIC STABILITY: FOOD INSECURITY: WITHIN THE PAST 12 MONTHS, YOU WORRIED THAT YOUR FOOD WOULD RUN OUT BEFORE YOU GOT MONEY TO BUY MORE.: NEVER TRUE

## 2021-10-04 SDOH — ECONOMIC STABILITY: FOOD INSECURITY: WITHIN THE PAST 12 MONTHS, THE FOOD YOU BOUGHT JUST DIDN'T LAST AND YOU DIDN'T HAVE MONEY TO GET MORE.: NEVER TRUE

## 2021-10-04 ASSESSMENT — SOCIAL DETERMINANTS OF HEALTH (SDOH): HOW HARD IS IT FOR YOU TO PAY FOR THE VERY BASICS LIKE FOOD, HOUSING, MEDICAL CARE, AND HEATING?: NOT HARD AT ALL

## 2021-10-04 NOTE — PATIENT INSTRUCTIONS

## 2021-10-05 DIAGNOSIS — F32.9 REACTIVE DEPRESSION: ICD-10-CM

## 2021-10-05 LAB
A/G RATIO: 2.1 (ref 1.1–2.2)
ALBUMIN SERPL-MCNC: 4.4 G/DL (ref 3.4–5)
ALP BLD-CCNC: 121 U/L (ref 40–129)
ALT SERPL-CCNC: 10 U/L (ref 10–40)
ANION GAP SERPL CALCULATED.3IONS-SCNC: 15 MMOL/L (ref 3–16)
AST SERPL-CCNC: 18 U/L (ref 15–37)
BILIRUB SERPL-MCNC: 0.7 MG/DL (ref 0–1)
BUN BLDV-MCNC: 16 MG/DL (ref 7–20)
CALCIUM SERPL-MCNC: 9.7 MG/DL (ref 8.3–10.6)
CHLORIDE BLD-SCNC: 105 MMOL/L (ref 99–110)
CO2: 24 MMOL/L (ref 21–32)
CREAT SERPL-MCNC: 0.8 MG/DL (ref 0.6–1.2)
GFR AFRICAN AMERICAN: >60
GFR NON-AFRICAN AMERICAN: >60
GLOBULIN: 2.1 G/DL
GLUCOSE BLD-MCNC: 100 MG/DL (ref 70–99)
MAGNESIUM: 2 MG/DL (ref 1.8–2.4)
POTASSIUM SERPL-SCNC: 3.9 MMOL/L (ref 3.5–5.1)
SODIUM BLD-SCNC: 144 MMOL/L (ref 136–145)
TOTAL PROTEIN: 6.5 G/DL (ref 6.4–8.2)

## 2021-10-05 RX ORDER — ESCITALOPRAM OXALATE 10 MG/1
10 TABLET ORAL DAILY
Qty: 90 TABLET | Refills: 3 | Status: SHIPPED | OUTPATIENT
Start: 2021-10-05

## 2021-10-11 ENCOUNTER — TELEPHONE (OUTPATIENT)
Dept: DERMATOLOGY | Age: 81
End: 2021-10-11

## 2021-10-27 DIAGNOSIS — F03.90 DEMENTIA WITHOUT BEHAVIORAL DISTURBANCE, UNSPECIFIED DEMENTIA TYPE: ICD-10-CM

## 2021-10-27 DIAGNOSIS — G31.84 MILD COGNITIVE IMPAIRMENT: ICD-10-CM

## 2021-10-27 RX ORDER — DONEPEZIL HYDROCHLORIDE 5 MG/1
TABLET, FILM COATED ORAL
Qty: 30 TABLET | Refills: 1 | Status: SHIPPED | OUTPATIENT
Start: 2021-10-27 | End: 2021-11-10

## 2021-11-10 ENCOUNTER — VIRTUAL VISIT (OUTPATIENT)
Dept: FAMILY MEDICINE CLINIC | Age: 81
End: 2021-11-10
Payer: MEDICARE

## 2021-11-10 DIAGNOSIS — F03.90 DEMENTIA WITHOUT BEHAVIORAL DISTURBANCE, UNSPECIFIED DEMENTIA TYPE: Primary | ICD-10-CM

## 2021-11-10 DIAGNOSIS — G31.84 MILD COGNITIVE IMPAIRMENT: ICD-10-CM

## 2021-11-10 PROCEDURE — 99441 PR PHYS/QHP TELEPHONE EVALUATION 5-10 MIN: CPT | Performed by: FAMILY MEDICINE

## 2021-11-10 RX ORDER — DONEPEZIL HYDROCHLORIDE 10 MG/1
10 TABLET, FILM COATED ORAL NIGHTLY
Qty: 30 TABLET | Refills: 11 | Status: SHIPPED | OUTPATIENT
Start: 2021-11-10 | End: 2021-12-02

## 2021-11-10 NOTE — PROGRESS NOTES
Philip Barcenas is a 80 y.o. female evaluated via telephone on 11/10/2021. Patient was started a month ago on Aricept 5 mg daily primarily to memory. She has not noticed any GI upset. Notices no improvement in memory yet. May be did have some diarrhea but also takes [place] which can cause diarrhea. She asked about an over-the-counter product called neuro Q     Internal Administration   First Dose COVID-19, Moderna, Primary or Immunocompromised, PF, 100mcg/0.5mL  01/21/2021   Second Dose COVID-19, Moderna, Primary or Immunocompromised, PF, 100mcg/0.5mL   02/25/2021       Last COVID Lab No results found for: SARS-COV-2, SARS-COV-2 RNA, SARS-COV-2, SARS-COV-2, SARS-COV-2 BY PCR, SARS-COV-2, SARS-COV-2, SARS-COV-2         Wt Readings from Last 3 Encounters:   10/04/21 139 lb (63 kg)   04/19/21 141 lb 3.2 oz (64 kg)   12/08/20 140 lb (63.5 kg)     BP Readings from Last 3 Encounters:   10/04/21 138/86   04/19/21 134/80   10/14/20 124/62     No results found for: LABA1C     ASSESSMENT PLAN      Diagnosis Orders   1. Dementia without behavioral disturbance, unspecified dementia type (HCC)  donepezil (ARICEPT) 10 MG tablet   2. Mild cognitive impairment  donepezil (ARICEPT) 10 MG tablet   Increase donepezil to 10 mg daily. Do a virtual follow-up in 2 months. At that time if no better can add Namenda. Regular follow-up in the office already scheduled in April. Patient should call the office immediately with new or ongoing signs or symptoms or worsening, or proceed to the emergency room. No changes in past medical history, past surgical history, social history, or family history were noted during the patient encounter unless specifically listed above. All updates of past medical history, past surgical history, social history, or family history were reviewed personally by me during the office visit. All problems listed in the assessment are stable unless noted otherwise.   Medication profile reviewed personally by me during the visit. Medication side effects and possible impairments from medications were discussed as applicable. This document was prepared by a combination of typing and transcription through a voice recognition software. [] Patient has completed an advance directive  [] Patient has NOT completed an advanced directive  [] Patient has a documented healthcare surrogate  [] Patient does NOT have a documented healthcare surrogate  [] Discussed the importance of establishing and updating an advanced directive. Patient has questions at this time and those were answered. [] Discussed the importance of establishing and updating an advanced directive. Patient does NOT have questions at this time. Discussed with: [] Patient            [] Family             [] Other caregiver         Consent:  She and/or health care decision maker is aware that that she may receive a bill for this telephone service, depending on her insurance coverage, and has provided verbal consent to proceed: Yes      Documentation:  I communicated with the patient and/or health care decision maker about see above. Details of this discussion including any medical advice provided: See above      I affirm this is a Patient Initiated Episode with an Established Patient who has not had a related appointment within my department in the past 7 days or scheduled within the next 24 hours.     Total Time: minutes: 5-10 minutes    Note: not billable if this call serves to triage the patient into an appointment for the relevant concern      Shari Thomas MD

## 2021-11-20 DIAGNOSIS — F03.90 DEMENTIA WITHOUT BEHAVIORAL DISTURBANCE, UNSPECIFIED DEMENTIA TYPE: ICD-10-CM

## 2021-11-20 DIAGNOSIS — G31.84 MILD COGNITIVE IMPAIRMENT: ICD-10-CM

## 2021-11-22 RX ORDER — DONEPEZIL HYDROCHLORIDE 5 MG/1
TABLET, FILM COATED ORAL
Qty: 30 TABLET | Refills: 1 | Status: SHIPPED | OUTPATIENT
Start: 2021-11-22 | End: 2022-03-10

## 2021-11-22 NOTE — TELEPHONE ENCOUNTER
Last visit was 11/10/21  Future Appointments   Date Time Provider Charu Mancilla   12/9/2021 10:40 AM MD ALEXANDER Alvarado [facility]   1/10/2022  9:40 AM MD Billy Aparicio  Lizeth Arthur MILAN   4/6/2022 10:00 AM MD Billy Aparicio [facility] KAYLI

## 2021-12-02 DIAGNOSIS — G31.84 MILD COGNITIVE IMPAIRMENT: ICD-10-CM

## 2021-12-02 DIAGNOSIS — F03.90 DEMENTIA WITHOUT BEHAVIORAL DISTURBANCE, UNSPECIFIED DEMENTIA TYPE: ICD-10-CM

## 2021-12-02 RX ORDER — DONEPEZIL HYDROCHLORIDE 10 MG/1
TABLET, FILM COATED ORAL
Qty: 30 TABLET | Refills: 11 | Status: SHIPPED | OUTPATIENT
Start: 2021-12-02 | End: 2022-03-10

## 2021-12-09 ENCOUNTER — OFFICE VISIT (OUTPATIENT)
Dept: DERMATOLOGY | Age: 81
End: 2021-12-09
Payer: MEDICARE

## 2021-12-09 VITALS
DIASTOLIC BLOOD PRESSURE: 54 MMHG | SYSTOLIC BLOOD PRESSURE: 146 MMHG | TEMPERATURE: 97.2 F | HEART RATE: 55 BPM | BODY MASS INDEX: 23.43 KG/M2 | WEIGHT: 136.5 LBS

## 2021-12-09 DIAGNOSIS — L57.8 ACTINODERMATOSIS: ICD-10-CM

## 2021-12-09 DIAGNOSIS — L40.0 PLAQUE PSORIASIS: Primary | ICD-10-CM

## 2021-12-09 DIAGNOSIS — Z79.899 ENCOUNTER FOR LONG-TERM (CURRENT) USE OF HIGH-RISK MEDICATION: ICD-10-CM

## 2021-12-09 DIAGNOSIS — L82.1 SEBORRHEIC KERATOSIS: ICD-10-CM

## 2021-12-09 PROCEDURE — G8482 FLU IMMUNIZE ORDER/ADMIN: HCPCS | Performed by: DERMATOLOGY

## 2021-12-09 PROCEDURE — 1123F ACP DISCUSS/DSCN MKR DOCD: CPT | Performed by: DERMATOLOGY

## 2021-12-09 PROCEDURE — 4040F PNEUMOC VAC/ADMIN/RCVD: CPT | Performed by: DERMATOLOGY

## 2021-12-09 PROCEDURE — 1036F TOBACCO NON-USER: CPT | Performed by: DERMATOLOGY

## 2021-12-09 PROCEDURE — G8399 PT W/DXA RESULTS DOCUMENT: HCPCS | Performed by: DERMATOLOGY

## 2021-12-09 PROCEDURE — 1090F PRES/ABSN URINE INCON ASSESS: CPT | Performed by: DERMATOLOGY

## 2021-12-09 PROCEDURE — 99214 OFFICE O/P EST MOD 30 MIN: CPT | Performed by: DERMATOLOGY

## 2021-12-09 PROCEDURE — G8420 CALC BMI NORM PARAMETERS: HCPCS | Performed by: DERMATOLOGY

## 2021-12-09 PROCEDURE — G8427 DOCREV CUR MEDS BY ELIG CLIN: HCPCS | Performed by: DERMATOLOGY

## 2021-12-09 RX ORDER — FLUOCINONIDE TOPICAL SOLUTION USP, 0.05% 0.5 MG/ML
SOLUTION TOPICAL
Qty: 60 ML | Refills: 3 | Status: SHIPPED | OUTPATIENT
Start: 2021-12-09 | End: 2022-03-16 | Stop reason: SDUPTHER

## 2021-12-09 RX ORDER — KETOCONAZOLE 20 MG/ML
SHAMPOO TOPICAL
Qty: 120 ML | Refills: 11 | Status: SHIPPED | OUTPATIENT
Start: 2021-12-09 | End: 2022-03-16 | Stop reason: SDUPTHER

## 2021-12-09 RX ORDER — CLOBETASOL PROPIONATE 0.5 MG/G
OINTMENT TOPICAL
Qty: 60 G | Refills: 2 | Status: SHIPPED | OUTPATIENT
Start: 2021-12-09 | End: 2022-03-16 | Stop reason: SDUPTHER

## 2021-12-09 NOTE — PROGRESS NOTES
Patient's Name: Philip Barcenas  MRN: <>  YOB: 1940  Date of Visit: 12/9/2021  Primary Care Provider: Jamil Oshea MD  Referring Provider: No ref. provider found    Subjective:     Chief Complaint   Patient presents with    Follow-up    Psoriasis     Patient is currently taking Jayden Sane, she believes the medication is drying her out too much. Patient presents today for follow up of psoriasis. Since last visit, the patient has been treating their psoriasis with Jayden Sane. She reports taking Otezla 30 mg twice daily. She has been having some abdominal pain and diarrhea. She reports her scalp is scaly and bumpy. She has been using her Lidex infrequently. Denies any lesions on her skin and no joint pain, swelling or redness. Past Medical History:  Past Medical History:   Diagnosis Date    Closed nondisplaced fracture of distal phalanx of lesser toe of left foot 6/14/2017    DDD (degenerative disc disease)     Grave's disease     Hyperlipidemia     Lichen planus     Overactive bladder     Psoriasis     Radiculopathy        Past Surgical History:  Past Surgical History:   Procedure Laterality Date    CYST REMOVAL      from right forearm 9/2016    EYE SURGERY      HYSTERECTOMY         Past Family History:  Family History   Problem Relation Age of Onset    Heart Disease Mother     Heart Disease Father          Allergies:   Allergies   Allergen Reactions    Bacitracin        Current Medications:  Current Outpatient Medications   Medication Sig Dispense Refill    donepezil (ARICEPT) 10 MG tablet TAKE 1 TABLET BY MOUTH EVERY DAY AT NIGHT 30 tablet 11    donepezil (ARICEPT) 5 MG tablet TAKE 1 TABLET BY MOUTH EVERY DAY AT NIGHT 30 tablet 1    escitalopram (LEXAPRO) 10 MG tablet Take 1 tablet by mouth daily 90 tablet 3    atorvastatin (LIPITOR) 10 MG tablet TAKE 1 TABLET DAILY 90 tablet 3    Apremilast (OTEZLA) 30 MG TABS Take 30 mg by mouth 2 times daily 60 tablet 5    ketoconazole (NIZORAL) 2 % shampoo Apply shampoo three times weekly (M, W, F) leave in 3-5 min, then rinse. May alternate with other shampoos 120 mL 11    loratadine (CLARITIN) 10 MG tablet Take 1 tablet by mouth daily 90 tablet 3    levothyroxine (SYNTHROID) 50 MCG tablet TAKE 1 TABLET BY MOUTH EVERY DAY 90 tablet 3    fluticasone (FLONASE) 50 MCG/ACT nasal spray SPRAY 1 SPRAY INTO EACH NOSTRIL EVERY DAY 12 g 3    tolterodine (DETROL LA) 4 MG ER capsule Take 4 mg by mouth daily.  timolol (BETIMOL) 0.5 % ophthalmic solution 1 drop 2 times daily.  fluocinonide (LIDEX) 0.05 % external solution Apply to scalp twice daily for two weeks, then taper to as needed with flares. (Patient not taking: Reported on 10/4/2021) 60 mL 3    clobetasol (TEMOVATE) 0.05 % cream Apply to affected area twice daily (Patient not taking: Reported on 4/30/2021) 60 g 2    Misc Natural Products (OSTEO BI-FLEX JOINT SHIELD PO) Take by mouth + VITAMIN D      ibandronate (BONIVA) 150 MG tablet Take 150 mg by mouth every 30 days Take 1 tablet every 30 days. (Patient not taking: Reported on 12/9/2021)       No current facility-administered medications for this visit. Review of Systems:  Constitutional: No fevers, chills or recent illness.   Skin: Skin:As per HPI AND otherwise no new, bleeding or symptomatic skin lesions      Objective:     Vitals:    12/09/21 1056   BP: (!) 146/54   Pulse: 55   Temp: 97.2 °F (36.2 °C)       Physical Examination:  General: alert, comfortable, no apparent distress, well-appearing  Psych: alert, oriented and pleasant  Neuro: oriented to person, place, and time  Skin: Areas examined: head including face, lips, conjunctiva and lids, neck, hair/scalp, left upper extremity, right lower extremity, left hand, right hand and digits and nails      All areas examined were within normal limits except those listed below with the appropriate assessment and plan      LAB RESULTS: Results for orders placed or performed in visit on 10/04/21   MAGNESIUM   Result Value Ref Range    Magnesium 2.00 1.80 - 2.40 mg/dL   COMPREHENSIVE METABOLIC PANEL   Result Value Ref Range    Sodium 144 136 - 145 mmol/L    Potassium 3.9 3.5 - 5.1 mmol/L    Chloride 105 99 - 110 mmol/L    CO2 24 21 - 32 mmol/L    Anion Gap 15 3 - 16    Glucose 100 (H) 70 - 99 mg/dL    BUN 16 7 - 20 mg/dL    CREATININE 0.8 0.6 - 1.2 mg/dL    GFR Non-African American >60 >60    GFR African American >60 >60    Calcium 9.7 8.3 - 10.6 mg/dL    Total Protein 6.5 6.4 - 8.2 g/dL    Albumin 4.4 3.4 - 5.0 g/dL    Albumin/Globulin Ratio 2.1 1.1 - 2.2    Total Bilirubin 0.7 0.0 - 1.0 mg/dL    Alkaline Phosphatase 121 40 - 129 U/L    ALT 10 10 - 40 U/L    AST 18 15 - 37 U/L    Globulin 2.1 g/dL         Assessment and Plan (with relevant objective exam findings):   1. Psoriasis: Type plaque flared and not at treatment goal    Location: vertex and parieto-temporal scalp extending to frontal scalp/hairline and conchal bowls  Objective findings:  Well-demarcated, erythematous, psoriasiform plaques with scale, some of which is micaceous. Treatments chosen:  - Fluocinolone 0.01% soln BID to plaques on scalp  - Clobetasol 0.05% ointment twice daily to ears and frontal scalp/hairline  -Start TGel or TSal shampoo TIW   - Continue Otezla 30 mg twice daily          2. Encounter for long-term (current) use of medications  Prior labs were reviewed today and were within normal limits. Discussed potential side effects of the medications used listed in this note    - Patient was advised to avoid live vaccines and discussed the importance of a yearly influenza vaccination while on this medication.  - Annual psoriasis skin check cancer screening was recommended. 3. Actinodermatosis  In all photo-exposed areas there were numerous light brown, lacy, 3-6 mm macules and some mottled hypo- and hyperpigmentation.  This was most prominent on the face, anterior chest, and shoulders. The patient was reassured that these changes do not require treatment, but indicated a significant amount of sun damage in the patient's past. The patient was briefly counseled on the importance of sun protective habits and encouraged to be seen for follow up evaluations in the future. ABCDEs of melanoma were reviewed. 4. Seborrheic Keratosis  Location: face, neck, arms  Objective: Waxy, stuck on keratotic brown  papules. -Reassurance provided to the patient regarding their chronic and benign nature. No treatment performed      Follow up:  Return visit in 3 months weeks or as needed for change in condition. All questions addressed.      Procedure:   No procedure performed      Atiya Vazquez MD, MS

## 2022-01-14 ENCOUNTER — OFFICE VISIT (OUTPATIENT)
Dept: ORTHOPEDIC SURGERY | Age: 82
End: 2022-01-14
Payer: MEDICARE

## 2022-01-14 VITALS — HEIGHT: 64 IN | BODY MASS INDEX: 23.22 KG/M2 | WEIGHT: 136 LBS

## 2022-01-14 DIAGNOSIS — M25.512 LEFT SHOULDER PAIN, UNSPECIFIED CHRONICITY: ICD-10-CM

## 2022-01-14 DIAGNOSIS — S42.252A DISPLACED FRACTURE OF GREATER TUBEROSITY OF LEFT HUMERUS, INITIAL ENCOUNTER FOR CLOSED FRACTURE: Primary | ICD-10-CM

## 2022-01-14 PROCEDURE — G8420 CALC BMI NORM PARAMETERS: HCPCS | Performed by: ORTHOPAEDIC SURGERY

## 2022-01-14 PROCEDURE — G8427 DOCREV CUR MEDS BY ELIG CLIN: HCPCS | Performed by: ORTHOPAEDIC SURGERY

## 2022-01-14 PROCEDURE — 4040F PNEUMOC VAC/ADMIN/RCVD: CPT | Performed by: ORTHOPAEDIC SURGERY

## 2022-01-14 PROCEDURE — 1123F ACP DISCUSS/DSCN MKR DOCD: CPT | Performed by: ORTHOPAEDIC SURGERY

## 2022-01-14 PROCEDURE — 99203 OFFICE O/P NEW LOW 30 MIN: CPT | Performed by: ORTHOPAEDIC SURGERY

## 2022-01-14 PROCEDURE — G8482 FLU IMMUNIZE ORDER/ADMIN: HCPCS | Performed by: ORTHOPAEDIC SURGERY

## 2022-01-14 PROCEDURE — 1090F PRES/ABSN URINE INCON ASSESS: CPT | Performed by: ORTHOPAEDIC SURGERY

## 2022-01-14 PROCEDURE — 1036F TOBACCO NON-USER: CPT | Performed by: ORTHOPAEDIC SURGERY

## 2022-01-14 PROCEDURE — G8399 PT W/DXA RESULTS DOCUMENT: HCPCS | Performed by: ORTHOPAEDIC SURGERY

## 2022-01-18 ENCOUNTER — TELEPHONE (OUTPATIENT)
Dept: FAMILY MEDICINE CLINIC | Age: 82
End: 2022-01-18

## 2022-01-18 DIAGNOSIS — M54.10 RADICULOPATHY, UNSPECIFIED SPINAL REGION: ICD-10-CM

## 2022-01-18 DIAGNOSIS — M51.36 LUMBAR DEGENERATIVE DISC DISEASE: Primary | ICD-10-CM

## 2022-01-18 NOTE — PROGRESS NOTES
ORTHOPAEDIC SURGERY INITIAL EVALUATION NOTE  Chief Complaint   Patient presents with    New Patient     L SHOULDER      HISTORY OF PRESENT ILLNESS:  58-year-old right-hand-dominant female with history of dementia presents for evaluation. She sustained a ground-level fall after feeling dizzy on 1-3-2022. She presented to the emergency department at [facility]. She was noted to have a fracture of her orbit for which she underwent ORIF. She also had a left shoulder dislocation with fracture of her greater tuberosity. She has been managed conservatively to this point. It has been 11 days since her injury. She has been compliant with sling immobilization. Her pain is improving. She denies dysesthesias. She denies repeat dislocation. Her pain is manageable at rest.  It is worsened with activity. Past Medical History:   Diagnosis Date    Closed nondisplaced fracture of distal phalanx of lesser toe of left foot 6/14/2017    DDD (degenerative disc disease)     Grave's disease     Hyperlipidemia     Lichen planus     Overactive bladder     Psoriasis     Radiculopathy        Current Outpatient Medications   Medication Sig Dispense Refill    ketoconazole (NIZORAL) 2 % shampoo Apply shampoo three times weekly (M, W, F) leave in 3-5 min, then rinse. May alternate with other shampoos 120 mL 11    fluocinonide (LIDEX) 0.05 % external solution Apply to scalp twice daily for two weeks, then taper to as needed with flares.  60 mL 3    clobetasol (TEMOVATE) 0.05 % ointment Apply to affected areas on frontal scalp and ears twice daily for 1-2 weeks, then as needed with flares 60 g 2    donepezil (ARICEPT) 10 MG tablet TAKE 1 TABLET BY MOUTH EVERY DAY AT NIGHT 30 tablet 11    donepezil (ARICEPT) 5 MG tablet TAKE 1 TABLET BY MOUTH EVERY DAY AT NIGHT 30 tablet 1    escitalopram (LEXAPRO) 10 MG tablet Take 1 tablet by mouth daily 90 tablet 3    atorvastatin (LIPITOR) 10 MG tablet TAKE 1 TABLET DAILY 90 tablet 3    Apremilast (OTEZLA) 30 MG TABS Take 30 mg by mouth 2 times daily 60 tablet 5    loratadine (CLARITIN) 10 MG tablet Take 1 tablet by mouth daily 90 tablet 3    levothyroxine (SYNTHROID) 50 MCG tablet TAKE 1 TABLET BY MOUTH EVERY DAY 90 tablet 3    fluticasone (FLONASE) 50 MCG/ACT nasal spray SPRAY 1 SPRAY INTO EACH NOSTRIL EVERY DAY 12 g 3    Misc Natural Products (OSTEO BI-FLEX JOINT SHIELD PO) Take by mouth + VITAMIN D      ibandronate (BONIVA) 150 MG tablet Take 150 mg by mouth every 30 days Take 1 tablet every 30 days. (Patient not taking: Reported on 2021)      tolterodine (DETROL LA) 4 MG ER capsule Take 4 mg by mouth daily.  timolol (BETIMOL) 0.5 % ophthalmic solution 1 drop 2 times daily. No current facility-administered medications for this visit. Past Surgical History:   Procedure Laterality Date    CYST REMOVAL      from right forearm 2016    EYE SURGERY      HYSTERECTOMY         Allergies   Allergen Reactions    Bacitracin        Family History   Problem Relation Age of Onset    Heart Disease Mother     Heart Disease Father        Social History     Socioeconomic History    Marital status:      Spouse name: Not on file    Number of children: Not on file    Years of education: Not on file    Highest education level: Not on file   Occupational History    Not on file   Tobacco Use    Smoking status: Former Smoker     Packs/day: 0.15     Years: 5.00     Pack years: 0.75     Types: Cigarettes     Quit date: 1998     Years since quittin.5    Smokeless tobacco: Never Used   Vaping Use    Vaping Use: Never used   Substance and Sexual Activity    Alcohol use:  Yes    Drug use: No    Sexual activity: Yes     Partners: Male   Other Topics Concern    Not on file   Social History Narrative    Not on file     Social Determinants of Health     Financial Resource Strain: Low Risk     Difficulty of Paying Living Expenses: Not hard at all   Food Insecurity: No Food Insecurity    Worried About Running Out of Food in the Last Year: Never true    Silvia of Food in the Last Year: Never true   Transportation Needs:     Lack of Transportation (Medical): Not on file    Lack of Transportation (Non-Medical): Not on file   Physical Activity:     Days of Exercise per Week: Not on file    Minutes of Exercise per Session: Not on file   Stress:     Feeling of Stress : Not on file   Social Connections:     Frequency of Communication with Friends and Family: Not on file    Frequency of Social Gatherings with Friends and Family: Not on file    Attends Yazidi Services: Not on file    Active Member of [address] [place] [address] [employer] or Organizations: Not on file    Attends Club or Organization Meetings: Not on file    Marital Status: Not on file   Intimate Partner Violence:     Fear of Current or Ex-Partner: Not on file    Emotionally Abused: Not on file    Physically Abused: Not on file    Sexually Abused: Not on file   Housing Stability:     Unable to Pay for Housing in the Last Year: Not on file    Number of [place] in the Last Year: Not on file    Unstable Housing in the Last Year: Not on file       Review of Systems : Please see today's intake form for complete review of systems. PHYSICAL EXAM  Gen: awake, alert, oriented  HEENT: atraumatic, normocephalic  Neck: supple  Resp: equal chest rise bilaterally, no audible wheezes, no accessory muscle use. CV: Lower extremities warm and well perfused. Abd: soft, nontender   Focused examination of the left upper extremity:  There are no cuts, wounds, or abrasions overlying the left shoulder. There is diffuse ecchymosis in the anterior shoulder and anterior biceps. There is no obvious deformity. Shoulder range of motion was not assessed today. Elbow was gently ranged from full extension to 140 degrees of flexion based on limits of pain. Sensation is intact to light touch in median, radial, ulnar, and axillary distributions. Motor function is intact to AIN, PIN, ulnar motor nerves. There is a strong palpable radial pulse, and brisk capillary refill to the fingers. Compartments are soft and compressible. RADIOGRAPHIC EXAM:  4 views of the left shoulder including AP, Grashey, scapular Y, and Velpeau x-ray demonstrate fracture involving the greater tuberosity with slight proximal migration. There is minor inferior pseudosubluxation involving the humeral head. There is good positioning of the humeral head seen on the Velpeau x-ray. No evidence of repeat dislocation or subluxation. There is questionable callus about the humeral head versus comminution from the greater tuberosity. There is minor AC joint osteoarthritic changes. No significant glenohumeral arthritis. ASSESSEMENT AND PLAN    80 y.o. female with the following orthopaedic surgery problems:    Left shoulder dislocation with displaced fracture of greater tuberosity    I discussed both operative and nonoperative treatment options. The patient is limited demands and has dementia. I discussed the injury pattern in detail. She opted to proceed with nonoperative treatment. She will remain in the sling for approximately 4 weeks. I recommend that she come out of the sling for elbow range of motion to prevent stiffness. She can also come out of the sling for hygiene purposes. She will use over-the-counter medications for pain. She will remain nonweightbearing to the left upper extremity. We will initiate physical therapy for range of motion activities once appropriate callus is seen. I did discuss that with the displacement of her tuberosity, limitations to overhead activities and range of motion as likely. This is likely even with the surgical intervention. They expressed understanding of this. We will see her back in another 3 weeks for repeat x-rays.     Chary Joe MD

## 2022-01-18 NOTE — TELEPHONE ENCOUNTER
Olya Cutler,  with [facility], called to schedule pt's f/u for d/c from [facility]. Pt has an appt scheduled on 1/26 w/PCP, changed appt to a f/u. Olya Cutler is also requesting orders for PT/OT for pt to have therapy at the facility.  Fax orders to [phone]

## 2022-02-03 DIAGNOSIS — E05.00 GRAVES DISEASE: ICD-10-CM

## 2022-02-04 RX ORDER — LEVOTHYROXINE SODIUM 0.05 MG/1
TABLET ORAL
Qty: 90 TABLET | Refills: 3 | Status: SHIPPED | OUTPATIENT
Start: 2022-02-04 | End: 2022-06-29

## 2022-02-11 ENCOUNTER — OFFICE VISIT (OUTPATIENT)
Dept: ORTHOPEDIC SURGERY | Age: 82
End: 2022-02-11
Payer: MEDICARE

## 2022-02-11 VITALS — WEIGHT: 136 LBS | HEIGHT: 64 IN | BODY MASS INDEX: 23.22 KG/M2

## 2022-02-11 DIAGNOSIS — S42.252A DISPLACED FRACTURE OF GREATER TUBEROSITY OF LEFT HUMERUS, INITIAL ENCOUNTER FOR CLOSED FRACTURE: Primary | ICD-10-CM

## 2022-02-11 PROCEDURE — G8420 CALC BMI NORM PARAMETERS: HCPCS | Performed by: ORTHOPAEDIC SURGERY

## 2022-02-11 PROCEDURE — 1036F TOBACCO NON-USER: CPT | Performed by: ORTHOPAEDIC SURGERY

## 2022-02-11 PROCEDURE — G8428 CUR MEDS NOT DOCUMENT: HCPCS | Performed by: ORTHOPAEDIC SURGERY

## 2022-02-11 PROCEDURE — 99213 OFFICE O/P EST LOW 20 MIN: CPT | Performed by: ORTHOPAEDIC SURGERY

## 2022-02-11 PROCEDURE — 1090F PRES/ABSN URINE INCON ASSESS: CPT | Performed by: ORTHOPAEDIC SURGERY

## 2022-02-11 PROCEDURE — G8482 FLU IMMUNIZE ORDER/ADMIN: HCPCS | Performed by: ORTHOPAEDIC SURGERY

## 2022-02-11 PROCEDURE — G8399 PT W/DXA RESULTS DOCUMENT: HCPCS | Performed by: ORTHOPAEDIC SURGERY

## 2022-02-11 PROCEDURE — 1123F ACP DISCUSS/DSCN MKR DOCD: CPT | Performed by: ORTHOPAEDIC SURGERY

## 2022-02-11 PROCEDURE — 4040F PNEUMOC VAC/ADMIN/RCVD: CPT | Performed by: ORTHOPAEDIC SURGERY

## 2022-02-14 NOTE — PROGRESS NOTES
ORTHOPAEDIC SURGERY FOLLOWUP VISIT    CHIEF COMPLAINT:  Follow-up left shoulder injury    DATE OF INJURY: 1/3/2022  DIAGNOSIS: Left proximal humerus fracture  DATE OF SURGERY: N/A, nonoperative management of left proximal humerus fracture    HISTORY OF PRESENT ILLNESS:  59-year-old right-hand-dominant female with history of dementia presents for repeat evaluation of her left proximal humerus fracture. She had a displaced greater tuberosity fragment. She has been managed conservatively. Is been 6 weeks since her injury States that her pain is. Overall, she is well managed. She has no pain at rest.  She does have some soreness in the superior/posterior shoulder with attempts at range of motion. She has been working with occupational therapy for finger, hand, and wrist, and elbow range of motion. She has been doing well with this. She denies fevers, chills, or night sweats. She has had some skin tearing to her volar forearm which is in a state of resolution. She denies numbness or tingling in her upper extremity. PHYSICAL EXAM:  General: Thin appearing female stated age. Minimally conversant. Presents with family at her side. Left upper extremity: No cuts, open wounds, or abrasions. There is no obvious deformity. There is tenderness to palpation diffusely about the shoulder. Sensation is intact to light touch in the axillary distribution. Passive range of motion of the shoulder assessed today is 0 to 80 degrees forward flexion before significant pain is encountered. There is 0 to 85 degrees abduction. Gentle internal/external rotation does not reproduce pain. There is no significant limitation here. Strength testing was not carried out today. Sensation is intact to light touch in median, radial, ulnar, and axillary distributions. Motor function is intact to AIN, PIN, ulnar motor nerves. There is a strong palpable radial pulse, and brisk capillary refill to the fingers.   Compartments are soft and compressible    RADIOGRAPHIC EXAM:  4 views of the left shoulder including AP, Grashey, scapular Y, and Velpeau view demonstrates inferior pseudosubluxation of the humeral head. Greater tuberosity fragment is in unchanged position from previous x-ray. There is signs of interval healing with sclerosis at the fracture site and subtle marginal callus. There is maturing bone within the subacromial space at the apex of the fracture site. There is minor anterior pseudosubluxation on the elbow x-ray. No additional fractures are seen. This may be consistent with subacromial hematoma versus deltoid atony. No significant degenerative changes. ASSESSMENT AND PLAN:  6 weeks status post nonoperatively managed left proximal humerus fracture    Nonweightbearing left upper extremity  Initiate physical therapy for her left shoulder. Active assisted range of motion would be allowed at this point. No strengthening. I reviewed that the x-rays may be related to subacromial hematoma versus deltoid atony. Inferior pseudosubluxation should improve with therapy. I counseled the patient and her family that a full range of motion may not be possible with the sort of injury at age 80, however she should be able to achieve significantly more range of motion that she has now. I would like to see her back in 1 month for repeat assessment with new x-rays.     Rita Flaherty MD

## 2022-02-17 ENCOUNTER — HOSPITAL ENCOUNTER (OUTPATIENT)
Dept: PHYSICAL THERAPY | Age: 82
Setting detail: THERAPIES SERIES
Discharge: HOME | End: 2022-02-17
Payer: MEDICARE

## 2022-02-17 PROCEDURE — 97016 VASOPNEUMATIC DEVICE THERAPY: CPT | Performed by: SPECIALIST

## 2022-02-17 PROCEDURE — 97110 THERAPEUTIC EXERCISES: CPT | Performed by: SPECIALIST

## 2022-02-17 PROCEDURE — 97161 PT EVAL LOW COMPLEX 20 MIN: CPT | Performed by: SPECIALIST

## 2022-02-17 PROCEDURE — 97140 MANUAL THERAPY 1/> REGIONS: CPT | Performed by: SPECIALIST

## 2022-02-17 NOTE — PLAN OF CARE
[place] [facility], [address] [facility] [place], [address] [facility] [address]  Phone: [phone]   Fax:     [phone]                                                       Physical Therapy Certification    Dear   Renzo Cartagena MD,    We had the pleasure of evaluating the following patient for physical therapy services at [address] [place] [address]. A summary of our findings can be found in the initial assessment below. This includes our plan of care. If you have any questions or concerns regarding these findings, please do not hesitate to contact me at the office phone number checked above.   Thank you for the referral.       Physician Signature:_______________________________Date:__________________  By signing above (or electronic signature), therapists plan is approved by physician      Patient: Og Vivas   : 1940   MRN: [id number]  Referring Physician:   Renzo Cartagena MD      Evaluation Date: 2022      Medical Diagnosis Information:    J33.696A (ICD-10-CM) - Displaced fracture of greater tuberosity of left humerus, initial encounter for closed fracture                                             Insurance information:    MEDICARE PART A AND B/ BCBS    Precautions/ Contra-indications: PROM/AAROM  No strengthening      C-SSRS Triggered by Intake questionnaire (Past 2 wk assessment):   [x] No, Questionnaire did not trigger screening.   [] Yes, Patient intake triggered further evaluation      [] C-SSRS Screening completed  [] PCP notified via Plan of Care  [] Emergency services notified     Latex Allergy:  [x]NO      []YES  Preferred Language for Healthcare:   [x]English       []other:    SUBJECTIVE: Patient states fall 1/3/22 nondisplaced humeral fracture left    Relevant Medical History:none  Functional Disability Index:  Quick Dash 86%     Pain Scale: 0-5/10  Easing factors: rest  Provocative Endocrine conditions   []Hypothyroid (E03.9)  []Hyperthyroid Gastrointestinal conditions   []Constipation (A50.58)   Metabolic conditions   []Morbid obesity (E66.01)  []Diabetes type 1(E10.65) or 2 (E11.65)   []Neuropathy (G60.9)     Pulmonary conditions   []Asthma (J45)  []Coughing   []COPD (J44.9)   Psychological Disorders  []Anxiety (F41.9)  []Depression (F32.9)   []Other:   []Other:          Barriers to/and or personal factors that will affect rehab potential:              []Age  []Sex              []Motivation/Lack of Motivation                        []Co-Morbidities              []Cognitive Function, education/learning barriers              []Environmental, home barriers              []profession/work barriers  []past PT/medical experience  []other:  Justification:      Falls Risk Assessment (30 days):   [x] Falls Risk assessed and no intervention required.   [] Falls Risk assessed and Patient requires intervention due to being higher risk   TUG score (>12s at risk):     [] Falls education provided, including       G-Codes:       ASSESSMENT:   Functional Impairments   []Noted spinal or UE joint hypomobility   []Noted spinal or UE joint hypermobility   [x]Decreased UE functional ROM   [x]Decreased UE functional strength   []Abnormal reflexes/sensation/myotomal/dermatomal deficits   [x]Decreased RC/scapular/core strength and neuromuscular control   []other:      Functional Activity Limitations (from functional questionnaire and intake)   [x]Reduced ability to tolerate prolonged functional positions   [x]Reduced ability or difficulty with changes of positions or transfers between positions   [x]Reduced ability to maintain good posture and demonstrate good body mechanics with sitting, bending, and lifting   [x] Reduced ability or tolerance with driving and/or computer work   [x]Reduced ability to sleep   [x]Reduced ability to perform lifting, reaching, carrying tasks   [x]Reduced ability to tolerate impact through UE   [x]Reduced ability to reach behind back   [x]Reduced ability to  or hold objects   [x]Reduced ability to throw or toss an object   []other:    Participation Restrictions   [x]Reduced participation in self care activities   [x]Reduced participation in home management activities   []Reduced participation in work activities   []Reduced participation in social activities. []Reduced participation in sport/recreation activities. Classification:   []Signs/symptoms consistent with post-surgical status including decreased ROM, strength and function.   []Signs/symptoms consistent with joint sprain/strain   []Signs/symptoms consistent with shoulder impingement   []Signs/symptoms consistent with shoulder/elbow/wrist tendinopathy   []Signs/symptoms consistent with Rotator cuff tear   []Signs/symptoms consistent with labral tear   []Signs/symptoms consistent with postural dysfunction    []Signs/symptoms consistent with Glenohumeral IR Deficit - <45 degrees   []Signs/symptoms consistent with facet dysfunction of cervical/thoracic spine    []Signs/symptoms consistent with pathology which may benefit from Dry needling     [x]other:  Shoulder fracture    Prognosis/Rehab Potential:      []Excellent   [x]Good    []Fair   []Poor    Tolerance of evaluation/treatment:    []Excellent   [x]Good    []Fair   []Poor    Physical Therapy Evaluation Complexity Justification  [x] A history of present problem with:  [x] no personal factors and/or comorbidities that impact the plan of care;  []1-2 personal factors and/or comorbidities that impact the plan of care  []3 personal factors and/or comorbidities that impact the plan of care  [x] An examination of body systems using standardized tests and measures addressing any of the following: body structures and functions (impairments), activity limitations, and/or participation restrictions;:  [x] a total of 1-2 or more elements   [] a total of 3 or more elements   [] a total of 4 or more elements   [x] A clinical presentation with:  [x] stable and/or uncomplicated characteristics   [] evolving clinical presentation with changing characteristics  [] unstable and unpredictable characteristics;   [x] Clinical decision making of [x] low, [] moderate, [] high complexity using standardized patient assessment instrument and/or measurable assessment of functional outcome. [x] EVAL (LOW) 21823 (typically 20 minutes face-to-face)  [] EVAL (MOD) 31363 (typically 30 minutes face-to-face)  [] EVAL (HIGH) 17756 (typically 45 minutes face-to-face)  [] RE-EVAL     PLAN:  Frequency/Duration: 1-2 days per week for 6-8 Weeks:  INTERVENTIONS:  [x] Therapeutic exercise including: strength training, ROM, for Upper extremity and core   [x]  NMR activation and proprioception for UE, scap and Core   [] Manual therapy as indicated for shoulder, scapula and spine to include: Dry Needling/IASTM, STM, PROM, Gr I-IV mobilizations, manipulation. [x] Modalities as needed that may include: thermal agents, E-stim, Biofeedback, US, iontophoresis as indicated  [x] Patient education on joint protection, postural re-education, activity modification, progression of HEP. HEP instruction: Refer to [address] [place] [address] access code and exercises on the 1st visit treatment note    GOALS:  Patient stated goal: reach/ ADLs    Therapist goals for Patient:   Short Term Goals: To be achieved in: 2 weeks   1. Independent in HEP and progression per patient tolerance, in order to prevent re-injury. [x] Progressing: [] Met: [] Not Met: [] Adjusted     2. Patient will have a decrease in pain to facilitate improvement in movement, function, and ADLs as indicated by Functional Deficits. [x] Progressing: [] Met: [] Not Met: [] Adjusted     Long Term Goals: To be achieved in: 6 weeks  1. Disability index score of 43% or less for the DASH to assist with reaching prior level of function. [x] Progressing: [] Met: [] Not Met: [] Adjusted     2. Patient will demonstrate increased AROM to 140 to allow for proper joint functioning as indicated by patients Functional Deficits. [x] Progressing: [] Met: [] Not Met: [] Adjusted     3. Patient will demonstrate an increase in Strength to 4+/5 to allow for proper functional mobility as indicated by patients Functional Deficits. [x] Progressing: [] Met: [] Not Met: [] Adjusted     4. Patient will return to [facility] for  functional activities without increased symptoms or restriction. [x] Progressing: [] Met: [] Not Met: [] Adjusted     5.  ADLs/ reach with min limitations (patient specific functional goal)    [x] Progressing: [] Met: [] Not Met: [] Adjusted      Electronically signed by:  Therese Wiseman PT

## 2022-02-17 NOTE — FLOWSHEET NOTE
[address] [facility], [address] [place] [address], [address] [place] [address] [address]  Phone: [phone]   Fax:     [phone]      Physical Therapy Treatment Note/ Progress Report:     Date:  2022    Patient Name:  Felicita Mckinley    :  1940  MRN: [id number]  Restrictions/Precautions:    Medical/Treatment Diagnosis Information:  ·   S42.252A (ICD-10-CM) - Displaced fracture of greater tuberosity of left humerus, initial encounter for closed fracture  ·    Insurance/Certification information:    MEDICARE PART A AND B/ BCBS  Physician Information:    Chanel Clifton MD  Has the plan of care been signed (Y/N):        []  Yes  [x]  No     Date of Patient follow up with Physician: NS    Is this a Progress Report:     []  Yes  [x]  No     If Yes:  Date Range for reporting period:  Beginnin22 ------------ Ending: 3/17/22    Progress report will be due (10 Rx or 30 days whichever is less):      Recertification will be due (POC Duration  / 90 days whichever is less): 22      Visit # Insurance Allowable Auth Required   In Person 1  []  Yes     []  No    Tele Health 0  []  Yes     []  No    Total 1       Functional Scale: Quick Dash 86%    Date assessed:  22      Latex Allergy:  [x]NO      []YES  Preferred Language for Healthcare:   [x]English       []other:    Pain level:  4/10     SUBJECTIVE:  See eval    OBJECTIVE: See eval   Observation:    Test measurements:      RESTRICTIONS/PRECAUTIONS: PROM/AAROM  No strengthening    Exercises/Interventions:   Therapeutic Ex (61443) Sets/sec Reps Notes/CUES HEP   PS CW/CCW  10x  x   Shrugs/ SBS  10x  x                 Cane ER stretch  10x  x   Table slides  10x  x                 Supine hands clasp flexion  5x  x                        Manual Intervention (01320)       JM/ S ROM  8 min                                        NMR re-education (53490)   CUES NEEDED Therapeutic Activity (89945)                     vaso  10 min                   [employer] access code: [email or web address]           Therapeutic Exercise and NMR EXR  [x] (59410) Provided verbal/tactile cueing for activities related to strengthening, flexibility, endurance, ROM  for improvements in scapular, scapulothoracic and UE control with self care, reaching, carrying, lifting, house/yardwork, driving/computer work.    [] (51161) Provided verbal/tactile cueing for activities related to improving balance, coordination, kinesthetic sense, posture, motor skill, proprioception  to assist with  scapular, scapulothoracic and UE control with self care, reaching, carrying, lifting, house/yardwork, driving/computer work. Therapeutic Activities:    [x] (25217 or 87506) Provided verbal/tactile cueing for activities related to improving balance, coordination, kinesthetic sense, posture, motor skill, proprioception and motor activation to allow for proper function of scapular, scapulothoracic and UE control with self care, carrying, lifting, driving/computer work.      Home Exercise Program:    [x] (88831) Reviewed/Progressed HEP activities related to strengthening, flexibility, endurance, ROM of scapular, scapulothoracic and UE control with self care, reaching, carrying, lifting, house/yardwork, driving/computer work  [] (71107) Reviewed/Progressed HEP activities related to improving balance, coordination, kinesthetic sense, posture, motor skill, proprioception of scapular, scapulothoracic and UE control with self care, reaching, carrying, lifting, house/yardwork, driving/computer work      Manual Treatments:  PROM / STM / Oscillations-Mobs:  G-I, II, III, IV (PA's, Inf., Post.)  [x] (36396) Provided manual therapy to mobilize soft tissue/joints of cervical/CT, scapular GHJ and UE for the purpose of modulating pain, promoting relaxation,  increasing ROM, reducing/eliminating soft tissue swelling/inflammation/restriction, improving soft tissue extensibility and allowing for proper ROM for normal function with self care, reaching, carrying, lifting, house/yardwork, driving/computer work    Modalities:     [x] GAME READY (VASO)- for significant edema, swelling, pain control. Charges:  Timed Code Treatment Minutes: 25   Total Treatment Minutes:  55   BWC:  TE TIME:  NMR TIME:  MANUAL TIME:  UNTIMED MINUTES:  Medicare Total:       [x] EVAL (LOW) 90658 (typically 20 minutes face-to-face)  [] EVAL (MOD) 30133 (typically 30 minutes face-to-face)  [] EVAL (HIGH) 29659 (typically 45 minutes face-to-face)  [] RE-EVAL     [x] YN(00634) x  1   [] IONTO  [] NMR (50097) x     [x] VASO  [x] Manual (76351) x  1   [] Other:  [] TA x      [] Mech Traction (48466)  [] ES(attended) (11079)      [] ES (un) (75336):    ASSESSMENT:  See eval      GOALS:      Patient stated goal: reach/ ADLs    Therapist goals for Patient:   Short Term Goals: To be achieved in: 2 weeks   1. Independent in HEP and progression per patient tolerance, in order to prevent re-injury. [x] Progressing: [] Met: [] Not Met: [] Adjusted     2. Patient will have a decrease in pain to facilitate improvement in movement, function, and ADLs as indicated by Functional Deficits. [x] Progressing: [] Met: [] Not Met: [] Adjusted     Long Term Goals: To be achieved in: 6 weeks  1. Disability index score of 43% or less for the DASH to assist with reaching prior level of function. [x] Progressing: [] Met: [] Not Met: [] Adjusted     2. Patient will demonstrate increased AROM to 140 to allow for proper joint functioning as indicated by patients Functional Deficits. [x] Progressing: [] Met: [] Not Met: [] Adjusted     3. Patient will demonstrate an increase in Strength to 4+/5 to allow for proper functional mobility as indicated by patients Functional Deficits. [x] Progressing: [] Met: [] Not Met: [] Adjusted     4.  Patient will return to [facility] for functional activities without increased symptoms or restriction. [x] Progressing: [] Met: [] Not Met: [] Adjusted     5. ADLs/ reach with min limitations (patient specific functional goal)    [x] Progressing: [] Met: [] Not Met: [] Adjusted            Overall Progression Towards Functional goals/ Treatment Progress Update:  [] Patient is progressing as expected towards functional goals listed. [] Progression is slowed due to complexities/Impairments listed. [] Progression has been slowed due to co-morbidities. [x] Plan just implemented, too soon to assess goals progression <30days   [] Goals require adjustment due to lack of progress  [] Patient is not progressing as expected and requires additional follow up with physician  [] Other    Prognosis for POC: [x] Good [] Fair  [] Poor      Patient requires continued skilled intervention: [x] Yes  [] No    Treatment/Activity Tolerance:  [x] Patient able to complete treatment  [] Patient limited by fatigue  [] Patient limited by pain    [] Patient limited by other medical complications  [] Other:       PLAN: See eval  [] Continue per plan of care [] Alter current plan (see comments above)  [x] Plan of care initiated [] Hold pending MD visit [] Discharge    Electronically signed by:  Sanrdita Mooney PT    Note: If patient does not return for scheduled/ recommended follow up visits, this note will serve as a discharge from care along with most recent update on progress.

## 2022-02-23 ENCOUNTER — HOSPITAL ENCOUNTER (OUTPATIENT)
Dept: PHYSICAL THERAPY | Age: 82
Setting detail: THERAPIES SERIES
Discharge: HOME | End: 2022-02-23
Payer: MEDICARE

## 2022-02-23 PROCEDURE — 97110 THERAPEUTIC EXERCISES: CPT

## 2022-02-23 PROCEDURE — 97140 MANUAL THERAPY 1/> REGIONS: CPT

## 2022-02-23 NOTE — FLOWSHEET NOTE
[address] [facility], [address]  Phone [phone]  Fax [phone]                                                [x] Daily Treatment Note   [] Progress Note   [] Discharge Note      Date:  2022    Patient Name:  Toshia Cedillo        :  1940    Diagnosis:  C24.960O (ICD-10-CM) - Displaced fracture of greater tuberosity of left humerus, initial encounter for closed fracture    Treatment Diagnosis:  same    Insurance:  [employer]    Referring Physician:  Velvet Del Valle MD    Plan of care signed (Y/N):    Progress report will be due (10 Rx or 30 days whichever is less):  3/17/22  Next MD is 85    Recertification will be due (POC Duration  / 90 days whichever is less): 22    Visit# / total visits:   Visit # Insurance Allowable Auth Required   In Person 2 Med nec []  Yes     [x]  No    Tele Health 0  []  Yes     []  No    Total 2       Latex Allergy:  [x]NO      []YES    Pain level: 1/10 Can range 1-3/10 depending on activity     Subjective:    22 Reports she is sleeping in recliner, does not really like to lay flat, usually slept on side before shoulder injury/  22 Patient states fall 1/3/22 nondisplaced humeral fracture left      Exercises:   Exercise/Equipment Resistance/Repetitions Other comments   Pend  x10 assist from daughter due to pt guarding x   Shrugs/scap retractions x10 ea x   Cane ER  x10 x   AAROM supine flex  x5-10 as able hands assist x   Table slide flex x10 x                                                                              Therapeutic Exercise:  10    Group Therapy:      Home Exercise Program:      Therapeutic Activity:      Neuromuscular Re-education:      Gait:      Manual Therapy:  30  PROM and gentle jt mob/oscillation    Canalith Repositioning Procedure:       Modalities:  13'   [x]?  GAME READY (VASO)- for significant edema, swelling, pain control. 38 degrees, mild compression x 15'     Charges:  Timed Code Treatment Minutes: 40   Total Treatment Minutes:  55   BWC time for each procedure?:  TE TIME:  NMR TIME:  MANUAL TIME:  UNTIMED MINUTES:       [] EVAL (LOW) 54741 (typically 20 minutes face-to-face)  [] EVAL (MOD) 52637 (typically 30 minutes face-to-face)  [] EVAL (HIGH) 11317 (typically 45 minutes face-to-face)  [] RE-EVAL     [x] XX(94565) x 1    [] IONTO  [] NMR (49613) x     [x] VASO  [x] Manual (74570) x 2    [] Other:  [] TA x      [] Mech Traction (99063)  [] ES(attended) (78519)     [] ES (un) (45166): Medicare Cap Total YTD:      Treatment/Activity Tolerance:    [x] Patient tolerated treatment well but rom is small [] Patient limited by fatigue   [] Patient limited by pain [] Patient limited by other medical complications   [] Other:     Prognosis: [x] Good [] Fair  [] Poor    Patient Requires Follow-up:  [x] Yes  [] No    Plan: [x] Continue per plan of care [] Alter current plan (see comments)   [] Plan of care initiated [] Hold pending MD visit [] Discharge    Plan for Next Session:      See Progress Note: [] Yes  [x] No       Next due:         Electronically signed by:  Scott Cooper, [id number]     Note: If patient does not return for scheduled/ recommended follow up visits, this note will serve as a discharge from care along with most recent update on progress. Outpatient Physical Therapy  Progress Note      Progress Note covers period from:   2/17/22 To       Subjective:           Objective:   Observation:   Test measurements:       Functional Outcome Measure:   Quick dash 2/14/22  86% disability    Assessment:   Summary:    Patient's response to treatment:      Goals:  Patient stated goal: reach/ ADLs     Therapist goals for Patient:   Short Term Goals: To be achieved in: 2 weeks   1. Independent in HEP and progression per patient tolerance, in order to prevent re-injury. [x]? Progressing: []? Met: []?  Not Met: []? Adjusted      2. Patient will have a decrease in pain to facilitate improvement in movement, function, and ADLs as indicated by Functional Deficits. [x]? Progressing: []? Met: []? Not Met: []? Adjusted      Long Term Goals: To be achieved in: 6 weeks  1. Disability index score of 43% or less for the DASH to assist with reaching prior level of function. [x]? Progressing: []? Met: []? Not Met: []? Adjusted      2. Patient will demonstrate increased AROM to 140 to allow for proper joint functioning as indicated by patients Functional Deficits. [x]? Progressing: []? Met: []? Not Met: []? Adjusted      3. Patient will demonstrate an increase in Strength to 4+/5 to allow for proper functional mobility as indicated by patients Functional Deficits. [x]? Progressing: []? Met: []? Not Met: []? Adjusted      4. Patient will return to [facility] for  functional activities without increased symptoms or restriction. [x]? Progressing: []? Met: []? Not Met: []? Adjusted      5. ADLs/ reach with min limitations (patient specific functional goal)    [x]? Progressing: []? Met: []? Not Met: []?  Adjusted        · Progress toward previous goals:      Plan:  ·     Electronically Signed by:

## 2022-03-02 ENCOUNTER — HOSPITAL ENCOUNTER (OUTPATIENT)
Dept: PHYSICAL THERAPY | Age: 82
Setting detail: THERAPIES SERIES
Discharge: HOME | End: 2022-03-02
Payer: MEDICARE

## 2022-03-02 NOTE — FLOWSHEET NOTE
Physical Therapy  Cancellation/No-show Note  Patient Name:  Lizy Navarrete  :  1940   Date:  3/2/2022  Cancels to Date: 1  No-shows to Date: 0    For today's appointment patient:  []  Cancelled  []  Rescheduled appointment  []  No-show     Reason given by patient:  []  Patient ill  [x]  Conflicting appointment  []  No transportation    []  Conflict with work  []  No reason given  []  Other:     Comments:      Electronically signed by:  Jimi Mix1 N Francois [address]

## 2022-03-09 ENCOUNTER — HOSPITAL ENCOUNTER (OUTPATIENT)
Dept: PHYSICAL THERAPY | Age: 82
Setting detail: THERAPIES SERIES
Discharge: HOME | End: 2022-03-09
Payer: MEDICARE

## 2022-03-09 NOTE — FLOWSHEET NOTE
Physical Therapy  Cancellation/No-show Note  Patient Name:  Joselito Keith  :  1940   Date:  3/9/2022  Cancels to Date: 2  No-shows to Date: 0    For today's appointment patient:  [x]  Cancelled  []  Rescheduled appointment  []  No-show     Reason given by patient:  []  Patient ill  []  Conflicting appointment  []  No transportation    []  Conflict with work  []  No reason given  []  Other:     Comments:  Personal reasons  Electronically signed byMaral Lozada  [id number]

## 2022-03-10 ENCOUNTER — OFFICE VISIT (OUTPATIENT)
Dept: DERMATOLOGY | Age: 82
End: 2022-03-10
Payer: MEDICARE

## 2022-03-10 VITALS — TEMPERATURE: 97.3 F

## 2022-03-10 DIAGNOSIS — L40.0 PLAQUE PSORIASIS: Primary | ICD-10-CM

## 2022-03-10 DIAGNOSIS — L57.8 ACTINODERMATOSIS: ICD-10-CM

## 2022-03-10 PROCEDURE — 1123F ACP DISCUSS/DSCN MKR DOCD: CPT | Performed by: DERMATOLOGY

## 2022-03-10 PROCEDURE — 1090F PRES/ABSN URINE INCON ASSESS: CPT | Performed by: DERMATOLOGY

## 2022-03-10 PROCEDURE — G8427 DOCREV CUR MEDS BY ELIG CLIN: HCPCS | Performed by: DERMATOLOGY

## 2022-03-10 PROCEDURE — 1036F TOBACCO NON-USER: CPT | Performed by: DERMATOLOGY

## 2022-03-10 PROCEDURE — G8399 PT W/DXA RESULTS DOCUMENT: HCPCS | Performed by: DERMATOLOGY

## 2022-03-10 PROCEDURE — 4040F PNEUMOC VAC/ADMIN/RCVD: CPT | Performed by: DERMATOLOGY

## 2022-03-10 PROCEDURE — G8482 FLU IMMUNIZE ORDER/ADMIN: HCPCS | Performed by: DERMATOLOGY

## 2022-03-10 PROCEDURE — G8420 CALC BMI NORM PARAMETERS: HCPCS | Performed by: DERMATOLOGY

## 2022-03-10 PROCEDURE — 99214 OFFICE O/P EST MOD 30 MIN: CPT | Performed by: DERMATOLOGY

## 2022-03-10 NOTE — PROGRESS NOTES
Patient's Name: Nohemi Braxton  MRN: <>  YOB: 1940  Date of Visit: 3/10/2022  Primary Care Provider: Palmer Minor MD  Referring Provider: No ref. provider found    Subjective:   Chief Complaint:   Psoriasis (Would like to discuss otezla)      History of Present Illness:   Nohemi Braxton is an 80 y.o. female who presents in clinic today for follow up on psoriasis. Last office visit: 12/9/21    Patient is accompanied by her  and provides a letter written by her daughter regarding their concerns. Patient  and family report she is developing diarrhea which interferes with her daily life. She has been embarrassed to leave the house, as she has had an accident      Patient reports her scalp psoriasis has improved since her last visit. She has been taking Otezla 30 mg twice daily. She has also been using ketoconazole shampoo and Lidex solution as needed. Denies any new, changing, non healing or bleeding lesions. She denies any psoriasis lesions on her body. Past medical and surgical histories, current medications, allergies, social and family histories were reviewed with no change since the last visit        Allergies: Allergies   Allergen Reactions    Bacitracin        Current Medications:  Current Outpatient Medications   Medication Sig Dispense Refill    levothyroxine (SYNTHROID) 50 MCG tablet TAKE 1 TABLET BY MOUTH EVERY DAY 90 tablet 3    ketoconazole (NIZORAL) 2 % shampoo Apply shampoo three times weekly (M, W, F) leave in 3-5 min, then rinse. May alternate with other shampoos 120 mL 11    fluocinonide (LIDEX) 0.05 % external solution Apply to scalp twice daily for two weeks, then taper to as needed with flares.  60 mL 3    clobetasol (TEMOVATE) 0.05 % ointment Apply to affected areas on frontal scalp and ears twice daily for 1-2 weeks, then as needed with flares 60 g 2    escitalopram (LEXAPRO) 10 MG tablet Take 1 tablet by mouth daily 90 tablet 3    atorvastatin (LIPITOR) 10 MG tablet TAKE 1 TABLET DAILY 90 tablet 3    Apremilast (OTEZLA) 30 MG TABS Take 30 mg by mouth 2 times daily 60 tablet 5    loratadine (CLARITIN) 10 MG tablet Take 1 tablet by mouth daily 90 tablet 3    fluticasone (FLONASE) 50 MCG/ACT nasal spray SPRAY 1 SPRAY INTO EACH NOSTRIL EVERY DAY 12 g 3    Misc Natural Products (OSTEO BI-FLEX JOINT SHIELD PO) Take by mouth + VITAMIN D      tolterodine (DETROL LA) 4 MG ER capsule Take 4 mg by mouth daily.  timolol (BETIMOL) 0.5 % ophthalmic solution 1 drop 2 times daily.  ibandronate (BONIVA) 150 MG tablet Take 150 mg by mouth every 30 days Take 1 tablet every 30 days. (Patient not taking: Reported on 12/9/2021)       No current facility-administered medications for this visit. Review of Systems:  Constitutional: No fevers, chills or recent illness. Skin: Skin:As per HPI AND otherwise no new, bleeding or symptomatic skin lesions      Objective:     Vitals:    03/10/22 1346   Temp: 97.3 °F (36.3 °C)   LABS:  RENAL      Component 01/05/2022       BUN 16   Creatinine 0.97   eGFR AA CKD-EPI 63   eGFR NONAA CKD-EPI 55   Sodium 142   Potassium 3.9   Free Calcium, WB --   Glucose 92   Chloride 107   CO2 29   Anion Gap 6   Calcium 8.2 Low       Osmolality, Calculated 295   Magnesium --           Physical Examination:  General: alert, comfortable, no apparent distress, well-appearing  Psych: alert, oriented and pleasant  Neuro: oriented to person, place, and time  Skin: Areas examined: head including face, lips, conjunctiva and lids, neck, hair/scalp, right upper extremity, left upper extremity, right lower extremity, left lower extremity, left hand, right hand and digits and nails      All areas examined were within normal limits except those listed below with the appropriate assessment and plan    Assessment and Plan (with relevant objective exam findings):     1. Plaque psoriasis  Location/objective findings:  Lt

## 2022-03-16 ENCOUNTER — HOSPITAL ENCOUNTER (OUTPATIENT)
Dept: PHYSICAL THERAPY | Age: 82
Setting detail: THERAPIES SERIES
Discharge: HOME | End: 2022-03-16
Payer: MEDICARE

## 2022-03-16 PROCEDURE — 97140 MANUAL THERAPY 1/> REGIONS: CPT

## 2022-03-16 PROCEDURE — 97016 VASOPNEUMATIC DEVICE THERAPY: CPT

## 2022-03-16 PROCEDURE — 97110 THERAPEUTIC EXERCISES: CPT

## 2022-03-16 RX ORDER — CLOBETASOL PROPIONATE 0.5 MG/G
OINTMENT TOPICAL
Qty: 60 G | Refills: 3 | Status: SHIPPED | OUTPATIENT
Start: 2022-03-16 | End: 2024-03-16

## 2022-03-16 RX ORDER — APREMILAST 30 MG/1
30 TABLET, FILM COATED ORAL 2 TIMES DAILY
Qty: 60 TABLET | Refills: 10 | Status: SHIPPED | OUTPATIENT
Start: 2022-03-16 | End: 2024-03-16

## 2022-03-16 RX ORDER — KETOCONAZOLE 20 MG/ML
SHAMPOO TOPICAL
Qty: 120 ML | Refills: 11 | Status: SHIPPED | OUTPATIENT
Start: 2022-03-16

## 2022-03-16 RX ORDER — FLUOCINONIDE TOPICAL SOLUTION USP, 0.05% 0.5 MG/ML
SOLUTION TOPICAL
Qty: 60 ML | Refills: 5 | Status: SHIPPED | OUTPATIENT
Start: 2022-03-16 | End: 2024-03-16

## 2022-03-16 NOTE — FLOWSHEET NOTE
[address] [facility], [address]  Phone [phone]  Fax [phone]                                                [x] Daily Treatment Note   [] Progress Note   [] Discharge Note      Date:  3/16/2022    Patient Name:  Marylin Diaz        :  1940    Diagnosis:  S42.252A (ICD-10-CM) - Displaced fracture of greater tuberosity of left humerus, initial encounter for closed fracture    Treatment Diagnosis:  same    Insurance:  Commut[employer] 15    Referring Physician:  Jill Coley MD    Plan of care signed (Y/N):    Progress report will be due (10 Rx or 30 days whichever is less):  3/17/22  Next MD is     Recertification will be due (POC Duration  / 90 days whichever is less): 22    Visit# / total visits:   Visit # Insurance Allowable Auth Required   In Person 3 Med nec []  Yes     [x]  No    Tele Health 0  []  Yes     []  No    Total 3       Latex Allergy:  [x]NO      []YES    Pain level: 1/10 Can range 1-4/10 depending on activity     Subjective:  3/16/22   Reports being pretty tender to the touch still, not really wearing sling any more  22 Reports she is sleeping in recliner, does not really like to lay flat, usually slept on side before shoulder injury/  22 Patient states fall 1/3/22 nondisplaced humeral fracture left      Exercises:   Exercise/Equipment Resistance/Repetitions Other comments   Pend  x10 assist from daughter due to pt guarding x   Shrugs/scap retractions x10 ea x   Cane ER  x10 x   AAROM supine flex  x5-10 as able hands assist x   Table slide flex x10 x                                                                              Therapeutic Exercise:  10,  Reviewed ex, adjusted hand positioning with ER cane ex    Group Therapy:      Home Exercise Program:      Therapeutic Activity:      Neuromuscular Re-education:      Gait:      Manual Therapy:  30   Pt still a little warm to the touch, reminded to ice as needed at home at least 2/day  PROM and gentle jt mob/oscillation, pt had some popping with flex today so limited that motion more  Gentle scap mob    Canalith Repositioning Procedure:       Modalities:  13'   [x]? GAME READY (VASO)- for significant edema, swelling, pain control. 38 degrees, mild compression x 15'     Charges:  Timed Code Treatment Minutes: 40   Total Treatment Minutes:  55   BWC time for each procedure?:  TE TIME:  NMR TIME:  MANUAL TIME:  UNTIMED MINUTES:       [] EVAL (LOW) 92525 (typically 20 minutes face-to-face)  [] EVAL (MOD) 04156 (typically 30 minutes face-to-face)  [] EVAL (HIGH) 37050 (typically 45 minutes face-to-face)  [] RE-EVAL     [x] DP(20176) x 1    [] IONTO  [] NMR (92595) x     [x] VASO  [x] Manual (60079) x 2    [] Other:  [] TA x      [] Mech Traction (65295)  [] ES(attended) (13925)     [] ES (un) (85503): Medicare Cap Total YTD:      Treatment/Activity Tolerance:    [x] Patient tolerated treatment well but rom is small [] Patient limited by fatigue   [] Patient limited by pain [] Patient limited by other medical complications   [] Other:     Prognosis: [x] Good [] Fair  [] Poor    Patient Requires Follow-up:  [x] Yes  [] No    Plan: [x] Continue per plan of care [] Alter current plan (see comments)   [] Plan of care initiated [] Hold pending MD visit [] Discharge    Plan for Next Session:      See Progress Note: [] Yes  [x] No       Next due:         Electronically signed by:  SELAM [id number]     Note: If patient does not return for scheduled/ recommended follow up visits, this note will serve as a discharge from care along with most recent update on progress.            Outpatient Physical Therapy  Progress Note      Progress Note covers period from:   2/17/22 To       Subjective:           Objective:   Observation:   Test measurements:       Functional Outcome Measure:   Quick dash 2/14/22  86% disability    Assessment:   Summary:    Patient's response to treatment:      Goals:  Patient stated goal: reach/ ADLs     Therapist goals for Patient:   Short Term Goals: To be achieved in: 2 weeks   1. Independent in HEP and progression per patient tolerance, in order to prevent re-injury. [x]? Progressing: []? Met: []? Not Met: []? Adjusted      2. Patient will have a decrease in pain to facilitate improvement in movement, function, and ADLs as indicated by Functional Deficits. [x]? Progressing: []? Met: []? Not Met: []? Adjusted      Long Term Goals: To be achieved in: 6 weeks  1. Disability index score of 43% or less for the DASH to assist with reaching prior level of function. [x]? Progressing: []? Met: []? Not Met: []? Adjusted      2. Patient will demonstrate increased AROM to 140 to allow for proper joint functioning as indicated by patients Functional Deficits. [x]? Progressing: []? Met: []? Not Met: []? Adjusted      3. Patient will demonstrate an increase in Strength to 4+/5 to allow for proper functional mobility as indicated by patients Functional Deficits. [x]? Progressing: []? Met: []? Not Met: []? Adjusted      4. Patient will return to [facility] for  functional activities without increased symptoms or restriction. [x]? Progressing: []? Met: []? Not Met: []? Adjusted      5. ADLs/ reach with min limitations (patient specific functional goal)    [x]? Progressing: []? Met: []? Not Met: []?  Adjusted        · Progress toward previous goals:      Plan:  ·     Electronically Signed by:

## 2022-03-16 NOTE — FLOWSHEET NOTE
[address] [facility], [address]  Phone [phone]  Fax [phone]                                                [x] Daily Treatment Note   [] Progress Note   [] Discharge Note      Date:  3/16/2022    Patient Name:  Annmarie Saeed        :  1940    Diagnosis:  W62.238D (ICD-10-CM) - Displaced fracture of greater tuberosity of left humerus, initial encounter for closed fracture    Treatment Diagnosis:  same    Insurance:  [address]    Referring Physician:  Chary Joe MD    Plan of care signed (Y/N):    Progress report will be due (10 Rx or 30 days whichever is less):  3/17/22  Next MD is     Recertification will be due (POC Duration  / 90 days whichever is less): 22    Visit# / total visits:   Visit # Insurance Allowable Auth Required   In Person 2 Med nec []  Yes     [x]  No    Tele Health 0  []  Yes     []  No    Total 2       Latex Allergy:  [x]NO      []YES    Pain level: 1/10 Can range 1-3/10 depending on activity     Subjective:    22 Reports she is sleeping in recliner, does not really like to lay flat, usually slept on side before shoulder injury/  22 Patient states fall 1/3/22 nondisplaced humeral fracture left      Exercises:   Exercise/Equipment Resistance/Repetitions Other comments   Pend  x10 assist from daughter due to pt guarding x   Shrugs/scap retractions x10 ea x   Cane ER  x10 x   AAROM supine flex  x5-10 as able hands assist x   Table slide flex x10 x                                                                              Therapeutic Exercise:  10    Group Therapy:      Home Exercise Program:      Therapeutic Activity:      Neuromuscular Re-education:      Gait:      Manual Therapy:  30  PROM and gentle jt mob/oscillation    Canalith Repositioning Procedure:       Modalities:  13'   [x]?  GAME READY (VASO)- for significant edema, swelling, pain control. 38 degrees, mild compression x 15'     Charges:  Timed Code Treatment Minutes: 40   Total Treatment Minutes:  55   BWC time for each procedure?:  TE TIME:  NMR TIME:  MANUAL TIME:  UNTIMED MINUTES:       [] EVAL (LOW) 58460 (typically 20 minutes face-to-face)  [] EVAL (MOD) 01260 (typically 30 minutes face-to-face)  [] EVAL (HIGH) 89755 (typically 45 minutes face-to-face)  [] RE-EVAL     [x] SW(73718) x 1    [] IONTO  [] NMR (97323) x     [x] VASO  [x] Manual (04845) x 2    [] Other:  [] TA x      [] Mech Traction (66170)  [] ES(attended) (66372)     [] ES (un) (00351): Medicare Cap Total YTD:      Treatment/Activity Tolerance:    [x] Patient tolerated treatment well but rom is small [] Patient limited by fatigue   [] Patient limited by pain [] Patient limited by other medical complications   [] Other:     Prognosis: [x] Good [] Fair  [] Poor    Patient Requires Follow-up:  [x] Yes  [] No    Plan: [x] Continue per plan of care [] Alter current plan (see comments)   [] Plan of care initiated [] Hold pending MD visit [] Discharge    Plan for Next Session:      See Progress Note: [] Yes  [x] No       Next due:         Electronically signed by:  Vivek Del Valle [id number]     Note: If patient does not return for scheduled/ recommended follow up visits, this note will serve as a discharge from care along with most recent update on progress. Outpatient Physical Therapy  Progress Note      Progress Note covers period from:   2/17/22 To       Subjective:           Objective:   Observation:   Test measurements:       Functional Outcome Measure:   Quick dash 2/14/22  86% disability    Assessment:   Summary:    Patient's response to treatment:      Goals:  Patient stated goal: reach/ ADLs     Therapist goals for Patient:   Short Term Goals: To be achieved in: 2 weeks   1. Independent in HEP and progression per patient tolerance, in order to prevent re-injury. [x]? Progressing: []? Met: []?  Not Met: []? Adjusted      2. Patient will have a decrease in pain to facilitate improvement in movement, function, and ADLs as indicated by Functional Deficits. [x]? Progressing: []? Met: []? Not Met: []? Adjusted      Long Term Goals: To be achieved in: 6 weeks  1. Disability index score of 43% or less for the DASH to assist with reaching prior level of function. [x]? Progressing: []? Met: []? Not Met: []? Adjusted      2. Patient will demonstrate increased AROM to 140 to allow for proper joint functioning as indicated by patients Functional Deficits. [x]? Progressing: []? Met: []? Not Met: []? Adjusted      3. Patient will demonstrate an increase in Strength to 4+/5 to allow for proper functional mobility as indicated by patients Functional Deficits. [x]? Progressing: []? Met: []? Not Met: []? Adjusted      4. Patient will return to [facility] for  functional activities without increased symptoms or restriction. [x]? Progressing: []? Met: []? Not Met: []? Adjusted      5. ADLs/ reach with min limitations (patient specific functional goal)    [x]? Progressing: []? Met: []? Not Met: []?  Adjusted        · Progress toward previous goals:      Plan:  ·     Electronically Signed by:

## 2022-03-23 ENCOUNTER — HOSPITAL ENCOUNTER (OUTPATIENT)
Dept: PHYSICAL THERAPY | Age: 82
Setting detail: THERAPIES SERIES
Discharge: HOME | End: 2022-03-23
Payer: MEDICARE

## 2022-03-23 PROCEDURE — 97140 MANUAL THERAPY 1/> REGIONS: CPT

## 2022-03-23 PROCEDURE — 97110 THERAPEUTIC EXERCISES: CPT

## 2022-03-23 NOTE — PROGRESS NOTES
PLEASE SEE BOTTOM OF NOTE FOR PROGRESS REPORT. THANKS YOU        RECOMMENDATIONS ________________________________________        SIGN AND DATE _____________________________________________                                                                                               [address] [facility], [address] [address]  Phone [phone]  Fax [phone]                                                [x] Daily Treatment Note   [x] Progress Note  3/23/22    [] Discharge Note      Date:  3/23/2022    Patient Name:  Haley Erickson        :  1940    Diagnosis:  S42.252A (ICD-10-CM) - Displaced fracture of greater tuberosity of left humerus, initial encounter for closed fracture    Treatment Diagnosis:  same    Insurance:  [address]    Referring Physician:  Celi Trivedi MD    Plan of care signed (Y/N):    Progress report will be due (10 Rx or 30 days whichever is less):  3/17/22  Next MD is 3/50/53    Recertification will be due (POC Duration  / 90 days whichever is less): 22    Visit# / total visits:   Visit # Insurance Allowable Auth Required   In Person [address] []  Yes     [x]  No    Tele Health 0  []  Yes     []  No    Total 4       Latex Allergy:  [x]NO      []YES    Pain level: 1/10 Can range 1-4/10 depending on activity. Subjective:  3/23/22  Pt will be seeing MD on Friday and her  is to have heart bypass surgery 22 so they are unsure as to availability of coming to therapy.   Reports she is able to use her arm better but pain can still get up to 4/10  3/16/22   Reports being pretty tender to the touch still, not really wearing sling any more  22 Reports she is sleeping in recliner, does not really like to lay flat, usually slept on side before shoulder injury/  22 Patient states fall 1/3/22 nondisplaced humeral fracture left      Exercises: Exercise/Equipment Resistance/Repetitions Other comments   Pend  x10 assist from daughter due to pt guarding x   Shrugs/scap retractions x10 ea x   Cane ER  x10 x   AAROM supine flex  x5-10 as able hands assist x   Table slide flex x10 x                                                                              Therapeutic Exercise:  8,  Reviewed ex,   PROM IR [email or web address], [email or web address], ABD 63 a couple of small pops with return to neutral position when done with prom, FLEX 70  Group Therapy:      Home Exercise Program:      Therapeutic Activity:      Neuromuscular Re-education:      Gait:      Manual Therapy:  30   Pt still a little warm to the touch, reminded to ice as needed at home at least 2/day  PROM and gentle jt mob/oscillation, pt had some popping with flex today so limited that motion more  Gentle scap mob    Canalith Repositioning Procedure:       Modalities:  declined in favor of ice at home'   []? GAME READY (VASO)- for significant edema, swelling, pain control. 38 degrees, mild compression x 15'     Charges:  Timed Code Treatment Minutes: 38   Total Treatment Minutes:  38   BWC time for each procedure?:  TE TIME:  NMR TIME:  MANUAL TIME:  UNTIMED MINUTES:       [] EVAL (LOW) 87952 (typically 20 minutes face-to-face)  [] EVAL (MOD) 68412 (typically 30 minutes face-to-face)  [] EVAL (HIGH) 17306 (typically 45 minutes face-to-face)  [] RE-EVAL     [x] MY(52762) x 1    [] IONTO  [] NMR (83318) x     [] VASO  [x] Manual (91585) x 2    [] Other:  [] TA x      [] Mech Traction (20649)  [] ES(attended) (76038)     [] ES (un) (64385):     Medicare Cap Total YTD:      Treatment/Activity Tolerance:    [x] Patient tolerated treatment well but rom is small [] Patient limited by fatigue   [] Patient limited by pain [] Patient limited by other medical complications   [] Other:     Prognosis: [x] Good [] Fair  [] Poor    Patient Requires Follow-up:  [x] Yes  [] No    Plan: [x] Continue per plan of care [] Alter current plan (see comments)   [] Plan of care initiated [] Hold pending MD visit [] Discharge    Plan for Next Session:      See Progress Note: [x] Yes  [] No       Next due:         Electronically signed by:  Scott Cooper, [id number]     Note: If patient does not return for scheduled/ recommended follow up visits, this note will serve as a discharge from care along with most recent update on progress. Outpatient Physical Therapy  Progress Note      Progress Note covers period from:   2/17/22 To  3/23/22      Subjective:  -Pain is 1-4/10   -Pt will be seeing MD on Friday and her  is to have heart bypass surgery 4/4/22 so they are unsure as to availability of coming to therapy. -Reports she is able to use her arm better but pain can still get up to 4/10       Objective:   Observation:   Test measurements:    PROM [email or web address], [email or web address], ABD 63 a couple of small pops with return to neutral position when done with prom, FLEX 70    Functional Outcome Measure:   Quick dash 2/14/22  86% disability    Assessment:   Summary: pt limited due to pain. She has been seen x 4 visits. ROM has improved a small amount.  Patient's response to treatment: fair with reports of pain and some popping in shoulder with return from rom     Goals:  Patient stated goal: reach/ ADLs     Therapist goals for Patient:   Short Term Goals: To be achieved in: 2 weeks   1. Independent in HEP and progression per patient tolerance, in order to prevent re-injury. [x]? Progressing: []? Met: []? Not Met: []? Adjusted      2. Patient will have a decrease in pain to facilitate improvement in movement, function, and ADLs as indicated by Functional Deficits. [x]? Progressing: []? Met: []? Not Met: []? Adjusted      Long Term Goals: To be achieved in: 6 weeks  1. Disability index score of 43% or less for the DASH to assist with reaching prior level of function. [x]? Progressing: []? Met: []? Not Met: []? Adjusted      2.  Patient will demonstrate increased AROM to 140 to allow for proper joint functioning as indicated by patients Functional Deficits. [x]? Progressing: []? Met: []? Not Met: []? Adjusted      3. Patient will demonstrate an increase in Strength to 4+/5 to allow for proper functional mobility as indicated by patients Functional Deficits. [x]? Progressing: []? Met: []? Not Met: []? Adjusted      4. Patient will return to [facility] for  functional activities without increased symptoms or restriction. [x]? Progressing: []? Met: []? Not Met: []? Adjusted      5. ADLs/ reach with min limitations (patient specific functional goal)    [x]? Progressing: []? Met: []? Not Met: []? Adjusted        · Progress toward previous goals:  working towards goals. Plan: will cont with therapy if pt is able to attend.   ·     Electronically Signed by: Crys Goldman [id number]  ЕЛЕНА [facility] [id number]

## 2022-03-25 ENCOUNTER — OFFICE VISIT (OUTPATIENT)
Dept: ORTHOPEDIC SURGERY | Age: 82
End: 2022-03-25
Payer: MEDICARE

## 2022-03-25 DIAGNOSIS — S42.252A DISPLACED FRACTURE OF GREATER TUBEROSITY OF LEFT HUMERUS, INITIAL ENCOUNTER FOR CLOSED FRACTURE: Primary | ICD-10-CM

## 2022-03-25 PROCEDURE — G8427 DOCREV CUR MEDS BY ELIG CLIN: HCPCS | Performed by: ORTHOPAEDIC SURGERY

## 2022-03-25 PROCEDURE — G8399 PT W/DXA RESULTS DOCUMENT: HCPCS | Performed by: ORTHOPAEDIC SURGERY

## 2022-03-25 PROCEDURE — 4040F PNEUMOC VAC/ADMIN/RCVD: CPT | Performed by: ORTHOPAEDIC SURGERY

## 2022-03-25 PROCEDURE — G8482 FLU IMMUNIZE ORDER/ADMIN: HCPCS | Performed by: ORTHOPAEDIC SURGERY

## 2022-03-25 PROCEDURE — 1036F TOBACCO NON-USER: CPT | Performed by: ORTHOPAEDIC SURGERY

## 2022-03-25 PROCEDURE — 1123F ACP DISCUSS/DSCN MKR DOCD: CPT | Performed by: ORTHOPAEDIC SURGERY

## 2022-03-25 PROCEDURE — 99213 OFFICE O/P EST LOW 20 MIN: CPT | Performed by: ORTHOPAEDIC SURGERY

## 2022-03-25 PROCEDURE — G8420 CALC BMI NORM PARAMETERS: HCPCS | Performed by: ORTHOPAEDIC SURGERY

## 2022-03-25 PROCEDURE — 1090F PRES/ABSN URINE INCON ASSESS: CPT | Performed by: ORTHOPAEDIC SURGERY

## 2022-03-25 NOTE — PROGRESS NOTES
ORTHOPAEDIC SURGERY FOLLOWUP VISIT     CHIEF COMPLAINT:  Follow-up left shoulder injury     DATE OF INJURY: 1/3/2022  DIAGNOSIS: Left proximal humerus fracture  DATE OF SURGERY: N/A, nonoperative management of left proximal humerus fracture     HISTORY OF PRESENT ILLNESS:  60-year-old right-hand-dominant female with history of dementia presents for repeat evaluation of her left proximal humerus fracture. She had a displaced greater tuberosity fragment. She has been managed conservatively. It has been approximately 10 weeks since her injury. Her pain is improving. She has no pain at rest.  She rates her pain a 3 out of 10 in severity today. She has been undergoing physical therapy and feels progress is being made. She does have some soreness in the superior/posterior shoulder with attempts at range of motion. She will occasionally get a clicking sensation in the shoulder with overhead activities. She denies fevers, chills, or night sweats. She denies numbness or tingling in her upper extremity.     PHYSICAL EXAM:  General: Thin appearing female stated age. Minimally conversant. Presents with family at her side. Left upper extremity: No cuts, open wounds, or abrasions. There is no obvious deformity. There is tenderness to palpation diffusely about the shoulder, improved from prior exam..  Sensation is intact to light touch in the axillary distribution. Passive range of motion of the shoulder assessed today is 0 to 85 degrees forward flexion before significant pain is encountered. There is 0 to 80 degrees abduction. Gentle internal/external rotation does not reproduce pain. There is no significant limitation here. There was minor impingement with attempts at overhead motion. The patient is able to reach up and touch the top of her head. Strength testing was not carried out today. Sensation is intact to light touch in median, radial, ulnar, and axillary distributions.   Motor function is intact to AIN, PIN, ulnar motor nerves. There is a strong palpable radial pulse, and brisk capillary refill to the fingers. Compartments are soft and compressible     RADIOGRAPHIC EXAM:  4 views of the left shoulder including AP, Grashey, scapular Y, and Velpeau view demonstrates improvement in prior inferior pseudosubluxation of the humeral head. The glenohumeral articulation is well positioned. Greater tuberosity fragment is in unchanged position from previous x-ray. There is maturing bony healing noted in near-anatomic position. There is a bone spur projecting superiorly into the subacromial space. No additional fractures are seen. Deltoid atony and/or subacromial hematoma resolved. No significant degenerative changes.     ASSESSMENT AND PLAN:  10 weeks status post nonoperatively managed left proximal humerus fracture     I do believe that she has some element of subacromial impingement. I would allow her to proceed with additional physical therapy. A repeat referral was provided. She can advance her activities as tolerated at this point. She may continue with strengthening and range of motion as tolerated. I encouraged her to attempt to get more overhead motion if possible. I would see her back in 1 month for repeat assessment. I did inform her that she had significant subacromial impingement, this could be managed arthroscopically in the future, however it is unlikely that it would be worth the risks of surgical intervention at that point.   I would see her back in 4 weeks.     Yue Thacker MD

## 2022-03-30 ENCOUNTER — HOSPITAL ENCOUNTER (OUTPATIENT)
Dept: PHYSICAL THERAPY | Age: 82
Setting detail: THERAPIES SERIES
Discharge: HOME | End: 2022-03-30
Payer: MEDICARE

## 2022-03-30 PROCEDURE — 97110 THERAPEUTIC EXERCISES: CPT

## 2022-03-30 PROCEDURE — 97140 MANUAL THERAPY 1/> REGIONS: CPT

## 2022-03-30 NOTE — FLOWSHEET NOTE
[address] [facility], [address]  Phone [phone]  Fax [phone]                                                [x] Daily Treatment Note   [x] Progress Note  3/23/22    [] Discharge Note      Date:  3/30/2022    Patient Name:  Joselito Keith        :  1940    Diagnosis:  S42.252A (ICD-10-CM) - Displaced fracture of greater tuberosity of left humerus, initial encounter for closed fracture    Treatment Diagnosis:  same    Insurance:  [employer] 15    Referring Physician:  Mary Whyte MD    Plan of care signed (Y/N):    Progress report will be due (10 Rx or 30 days whichever is less):  22  Next MD is     Recertification will be due (POC Duration  / 90 days whichever is less): 22    Visit# / total visits:   Visit # Insurance Allowable Auth Required   In Person [facility] [x]  Yes     [x]  No    [facility] 0  []  Yes     []  No    Total 6       Latex Allergy:  [x]NO      []YES    Pain level: 1/10 Can range 1-4/10 depending on activity. Subjective:  3/30/22  Reports pain is about the same. (MD progress note is in electronic record 3/25/22I do believe that she has some element of subacromial impingement. I would allow her to proceed with additional physical therapy. A repeat referral was provided. She can advance her activities as tolerated at this point. She may continue with strengthening and range of motion as tolerated. I encouraged her to attempt to get more overhead motion if possible. I would see her back in 1 month for repeat assessment.   I did inform her that she had significant subacromial impingement, this could be managed arthroscopically in the future, however it is unlikely that it would be worth the risks of surgical intervention at that point.  I would see her back in 4 weeks.)  3/23/22  Pt will be seeing MD on Friday and her  is to have heart bypass surgery 4/4/22 so they are unsure as to availability of coming to therapy. Reports she is able to use her arm better but pain can still get up to 4/10  3/16/22   Reports being pretty tender to the touch still, not really wearing sling any more  2/23/22 Reports she is sleeping in recliner, does not really like to lay flat, usually slept on side before shoulder injury/  2/17/22 Patient states fall 1/3/22 nondisplaced humeral fracture left    RADIOGRAPHIC EXAM: from 3/25/22  4 views of the left shoulder including AP, Grashey, scapular Y, and Velpeau view demonstrates improvement in prior inferior pseudosubluxation of the humeral head. The glenohumeral articulation is well positioned. Greater tuberosity fragment is in unchanged position from previous x-ray. There is maturing bony healing noted in near-anatomic position. There is a bone spur projecting superiorly into the subacromial space.  No additional fractures are seen.    Deltoid atony and/or subacromial hematoma resolved. No significant degenerative changes.     Exercises:   Exercise/Equipment Resistance/Repetitions Other comments   Pulley  5' given pic to order or make one x   Pend  x10 assist from daughter due to pt guarding x   Shrugs/scap retractions x10 ea x   Cane ER  x10 x   AAROM supine flex  x5-10 as able hands assist x   Table slide flex x10 x   Bicep curl  x10 or 2x5 x   Supine elbow ext stretch  30-60\" at a time xLet arm swing when walking at home   Elbow ext alexa against door frame  5-10x    5\" hold x                                                               Therapeutic Exercise:  15,  Reviewed ex,   PROM IR [email or web address], [email or web address], ABD 65, FLEX 70 some popping during rom (pt states MD is aware)  Elbow ext 27 active  20 passive, elbow flex 148  Group Therapy:      Home Exercise Program:      Therapeutic Activity:      Neuromuscular Re-education:      Gait:      Manual Therapy:  30   PROM and gentle jt mob/oscillation, pt had some popping with flex today so limited that motion more  Gentle scap mob    Canalith Repositioning Procedure:       Modalities:  declined in favor of ice at home'   []? GAME READY (VASO)- for significant edema, swelling, pain control. 38 degrees, mild compression x 15'     Charges:  Timed Code Treatment Minutes: 45   Total Treatment Minutes:  45   BWC time for each procedure?:  TE TIME:  NMR TIME:  MANUAL TIME:  UNTIMED MINUTES:       [] EVAL (LOW) 39415 (typically 20 minutes face-to-face)  [] EVAL (MOD) 57289 (typically 30 minutes face-to-face)  [] EVAL (HIGH) 37352 (typically 45 minutes face-to-face)  [] RE-EVAL     [x] II(46187) x 1    [] IONTO  [] NMR (51547) x     [] VASO  [x] Manual (65605) x 2    [] Other:  [] TA x      [] Mech Traction (16418)  [] ES(attended) (77396)     [] ES (un) (49569): Medicare Cap Total YTD:  $594    Treatment/Activity Tolerance:    [x] Patient tolerated treatment well but rom is small [] Patient limited by fatigue   [] Patient limited by pain [] Patient limited by other medical complications   [] Other:     Prognosis: [x] Good [] Fair  [] Poor    Patient Requires Follow-up:  [x] Yes  [] No    Plan: [x] Continue per plan of care [] Alter current plan (see comments)   [] Plan of care initiated [] Hold pending MD visit [] Discharge    Plan for Next Session:      See Progress Note: [] Yes  [x] No       Next due:         Electronically signed by:  Bg Davis, [id number]     Note: If patient does not return for scheduled/ recommended follow up visits, this note will serve as a discharge from care along with most recent update on progress.            Outpatient Physical Therapy  Progress Note      Progress Note covers period from:   2/17/22 To  3/23/22      Subjective:  -Pain is 1-4/10   -Pt will be seeing MD on Friday and her  is to have heart bypass surgery 4/4/22 so they are unsure as to availability of coming to therapy. -Reports she is able to use her arm better but pain can still get up to 4/10       Objective:   Observation:   Test measurements:    PROM IR [email or web address], [email or web address], ABD 63 a couple of small pops with return to neutral position when done with prom, FLEX 70    Functional Outcome Measure:   Quick dash 2/14/22  86% disability    Assessment:   Summary: pt limited due to pain. She has been seen x 4 visits. ROM has improved a small amount.  Patient's response to treatment: fair with reports of pain and some popping in shoulder with return from rom     Goals:  Patient stated goal: reach/ ADLs     Therapist goals for Patient:   Short Term Goals: To be achieved in: 2 weeks   1. Independent in HEP and progression per patient tolerance, in order to prevent re-injury. [x]? Progressing: []? Met: []? Not Met: []? Adjusted      2. Patient will have a decrease in pain to facilitate improvement in movement, function, and ADLs as indicated by Functional Deficits. [x]? Progressing: []? Met: []? Not Met: []? Adjusted      Long Term Goals: To be achieved in: 6 weeks  1. Disability index score of 43% or less for the DASH to assist with reaching prior level of function. [x]? Progressing: []? Met: []? Not Met: []? Adjusted      2. Patient will demonstrate increased AROM to 140 to allow for proper joint functioning as indicated by patients Functional Deficits. [x]? Progressing: []? Met: []? Not Met: []? Adjusted      3. Patient will demonstrate an increase in Strength to 4+/5 to allow for proper functional mobility as indicated by patients Functional Deficits. [x]? Progressing: []? Met: []? Not Met: []? Adjusted      4. Patient will return to [facility] for  functional activities without increased symptoms or restriction. [x]? Progressing: []? Met: []? Not Met: []? Adjusted      5. ADLs/ reach with min limitations (patient specific functional goal)    [x]? Progressing: []? Met: []?  Not Met: []? Adjusted        · Progress toward previous goals:  working towards goals. Plan: will cont with therapy if pt is able to attend.   ·     Electronically Signed by: Jose Campbell [id number]  ЕЛЕНА Gregory 20 PTA [id number]

## 2022-04-11 ENCOUNTER — HOSPITAL ENCOUNTER (OUTPATIENT)
Dept: PHYSICAL THERAPY | Age: 82
Setting detail: THERAPIES SERIES
Discharge: HOME | End: 2022-04-11
Payer: MEDICARE

## 2022-04-11 PROCEDURE — 97110 THERAPEUTIC EXERCISES: CPT

## 2022-04-11 PROCEDURE — 97140 MANUAL THERAPY 1/> REGIONS: CPT

## 2022-04-11 NOTE — FLOWSHEET NOTE
[address] [facility], [address]  Phone [phone]  Fax [phone]                                                [x] Daily Treatment Note   [x] Progress Note  3/23/22    [] Discharge Note      Date:  2022    Patient Name:  Alex Avery        :  1940    Diagnosis:  S42.252A (ICD-10-CM) - Displaced fracture of greater tuberosity of left humerus, initial encounter for closed fracture    Treatment Diagnosis:  same    Insurance:  [place] 15    Referring Physician:  Bart Barragan MD    Plan of care signed (Y/N):    Progress report will be due (10 Rx or 30 days whichever is less):  22  Next MD is     Recertification will be due (POC Duration  / 90 days whichever is less): 22    Visit# / total visits:   Visit # Insurance Allowable Auth Required   In Person [id number] [facility] [x]  Yes     [x]  No    [facility] 0  []  Yes     []  No    Total 6       Latex Allergy:  [x]NO      []YES    Pain level: 1/10 Can range  2-3/10 depending on activity. Subjective: 22  Pt arrived to therapy with her grand daughter. Pain scale is decreasing. She has been with her  in the hospital.  Discussed increasing her to 2x wk in about 2-3 more weeks when she may be able to do more in therapy and have less of a schedualing/ride problem when her  will be hoe and feeling better. 3/30/22  Reports pain is about the same. (MD progress note is in electronic record 3/25/22I do believe that she has some element of subacromial impingement. I would allow her to proceed with additional physical therapy. A repeat referral was provided. She can advance her activities as tolerated at this point. She may continue with strengthening and range of motion as tolerated.   I encouraged her to attempt to get more overhead motion if possible. I would see her back in 1 month for repeat assessment. I did inform her that she had significant subacromial impingement, this could be managed arthroscopically in the future, however it is unlikely that it would be worth the risks of surgical intervention at that point. I would see her back in 4 weeks.)  3/23/22  Pt will be seeing MD on Friday and her  is to have heart bypass surgery 4/4/22 so they are unsure as to availability of coming to therapy. Reports she is able to use her arm better but pain can still get up to 4/10  3/16/22   Reports being pretty tender to the touch still, not really wearing sling any more  2/23/22 Reports she is sleeping in recliner, does not really like to lay flat, usually slept on side before shoulder injury/  2/17/22 Patient states fall 1/3/22 nondisplaced humeral fracture left    RADIOGRAPHIC EXAM: from 3/25/22  4 views of the left shoulder including AP, Grashey, scapular Y, and Velpeau view demonstrates improvement in prior inferior pseudosubluxation of the humeral head. The glenohumeral articulation is well positioned. Greater tuberosity fragment is in unchanged position from previous x-ray. There is maturing bony healing noted in near-anatomic position. There is a bone spur projecting superiorly into the subacromial space.  No additional fractures are seen.    Deltoid atony and/or subacromial hematoma resolved. No significant degenerative changes.     Exercises:   Exercise/Equipment Resistance/Repetitions Other comments   Pulley  5' given pic to order or make one x   Pend  x10 assist from daughter due to pt guarding x   Shrugs/scap retractions x10 ea x   Cane ER  x10 x   AAROM supine flex  x5-10 as able hands assist x   Table slide flex x10 x   Bicep curl  x10 or 2x5 x   Supine elbow ext stretch  30-60\" at a time xLet arm swing when walking at home   Elbow ext alexa against door frame  5-10x    5\" hold x   4/11/22 Leaning on counter: sh ext  2x10     Add small wt NV x          Leaning short row 2x10     \"                  \" x     Seated bicep curls 2#  1x 10   1#  1x 10 x   Scapular depression in supine or recline ( back pockets) 2x10 x   Supine elbow ext 1#  2x 10  will add to HEP   Small ceiling punch w/ asst 2x10  ''                ''                       NV- start scap ex in SL w/ ressitance, check on HEP, advance HEP,                                 Therapeutic Exercise:   30 ,  Advanced HEP to work on AROM and strength. 3/30/22  PROM IR [email or web address], [email or web address], ABD 65, FLEX 70 some popping during rom (pt states MD is aware)  Elbow ext 27 active  20 passive, elbow flex 148  Group Therapy:      Home Exercise Program:      Therapeutic Activity:      Neuromuscular Re-education:      Gait:      Manual Therapy:   15 min     AROM:  65 flx    35 ext    50 abd,  IR to L5   ER to C4                                         PROM:  105 flx       Canalith Repositioning Procedure:       Modalities:  declined in favor of ice at home'   []? GAME READY (VASO)- for significant edema, swelling, pain control. 38 degrees, mild compression x 15'     Charges:  Timed Code Treatment Minutes: 45   Total Treatment Minutes:  45   BWC time for each procedure?:  TE TIME:  NMR TIME:  MANUAL TIME:  UNTIMED MINUTES:       [] EVAL (LOW) 89514 (typically 20 minutes face-to-face)  [] EVAL (MOD) 81182 (typically 30 minutes face-to-face)  [] EVAL (HIGH) 58751 (typically 45 minutes face-to-face)  [] RE-EVAL     [x] KE(88167) x  2    [] IONTO  [] NMR (51495) x     [] VASO  [x] Manual (74168) x    [] Other:  [] TA x      [] Mech Traction (99510)  [] ES(attended) (57925)     [] ES (un) (74573):     Medicare Cap Total YTD:  [id number]    Treatment/Activity Tolerance:    [x] Patient tolerated treatment well but rom is small [] Patient limited by fatigue   [] Patient limited by pain [] Patient limited by other medical complications   [] Other:     Prognosis: [x] Good [] Fair  [] Poor    Patient Requires Follow-up:  [x] Yes  [] No    Plan: [x] Continue per plan of care [] Alter current plan (see comments)   [] Plan of care initiated [] Hold pending MD visit [] Discharge          See Progress Note: [] Yes  [x] No       Next due:         Electronically signed by:  Erika Espana PTRan     Note: If patient does not return for scheduled/ recommended follow up visits, this note will serve as a discharge from care along with most recent update on progress. Outpatient Physical Therapy  Progress Note      Progress Note covers period from:   2/17/22 To  3/23/22      Subjective:  -Pain is 1-4/10   -Pt will be seeing MD on Friday and her  is to have heart bypass surgery 4/4/22 so they are unsure as to availability of coming to therapy. -Reports she is able to use her arm better but pain can still get up to 4/10       Objective:   Observation:   Test measurements:    PROM IR [email or web address], [email or web address], ABD 63 a couple of small pops with return to neutral position when done with prom, FLEX 70    Functional Outcome Measure:   Quick dash 2/14/22  86% disability    Assessment:   Summary: pt limited due to pain. She has been seen x 4 visits. ROM has improved a small amount.  Patient's response to treatment: fair with reports of pain and some popping in shoulder with return from rom     Goals:  Patient stated goal: reach/ ADLs     Therapist goals for Patient:   Short Term Goals: To be achieved in: 2 weeks   1. Independent in HEP and progression per patient tolerance, in order to prevent re-injury. [x]? Progressing: []? Met: []? Not Met: []? Adjusted      2. Patient will have a decrease in pain to facilitate improvement in movement, function, and ADLs as indicated by Functional Deficits. [x]? Progressing: []? Met: []? Not Met: []? Adjusted      Long Term Goals: To be achieved in: 6 weeks  1.  Disability index score of 43% or less for the DASH to assist with reaching prior level of function. [x]? Progressing: []? Met: []? Not Met: []? Adjusted      2. Patient will demonstrate increased AROM to 140 to allow for proper joint functioning as indicated by patients Functional Deficits. [x]? Progressing: []? Met: []? Not Met: []? Adjusted      3. Patient will demonstrate an increase in Strength to 4+/5 to allow for proper functional mobility as indicated by patients Functional Deficits. [x]? Progressing: []? Met: []? Not Met: []? Adjusted      4. Patient will return to [facility] for  functional activities without increased symptoms or restriction. [x]? Progressing: []? Met: []? Not Met: []? Adjusted      5. ADLs/ reach with min limitations (patient specific functional goal)    [x]? Progressing: []? Met: []? Not Met: []? Adjusted        · Progress toward previous goals:  working towards goals. Plan: will cont with therapy if pt is able to attend.   ·     Electronically Signed by: Kaleb Engle  R Vidal Gregory 20 PTA [id number]

## 2022-04-15 ENCOUNTER — TELEPHONE (OUTPATIENT)
Dept: DERMATOLOGY | Age: 82
End: 2022-04-15

## 2022-04-15 NOTE — TELEPHONE ENCOUNTER
[phone] Emani monahan/ [employer] is requesting a return call for a PA for case # [id number] for otezela 30 mg tabs please advise. Thank you!

## 2022-04-18 ENCOUNTER — HOSPITAL ENCOUNTER (OUTPATIENT)
Dept: PHYSICAL THERAPY | Age: 82
Setting detail: THERAPIES SERIES
Discharge: HOME | End: 2022-04-18
Payer: MEDICARE

## 2022-04-18 PROCEDURE — 97140 MANUAL THERAPY 1/> REGIONS: CPT

## 2022-04-18 PROCEDURE — 97110 THERAPEUTIC EXERCISES: CPT

## 2022-04-25 ENCOUNTER — HOSPITAL ENCOUNTER (OUTPATIENT)
Dept: PHYSICAL THERAPY | Age: 82
Setting detail: THERAPIES SERIES
Discharge: HOME | End: 2022-04-25
Payer: MEDICARE

## 2022-04-25 PROCEDURE — 97110 THERAPEUTIC EXERCISES: CPT

## 2022-04-25 PROCEDURE — 97140 MANUAL THERAPY 1/> REGIONS: CPT

## 2022-04-25 NOTE — PROGRESS NOTES
[address] [facility], [address]  Phone [phone]  Fax [phone]                                                [x] Daily Treatment Note   [x] Progress Note  22 at the bottom of this page    [] Discharge Note      Date:  2022    Patient Name:  Carmella Maza        :  1940    Diagnosis:  T80.141A (ICD-10-CM) - Displaced fracture of greater tuberosity of left humerus, initial encounter for closed fracture    Treatment Diagnosis:  same    Insurance:  [employer] 15    Referring Physician:  Dru Kaba MD    Plan of care signed (Y/N):    Progress report will be due (10 Rx or 30 days whichever is less):  22  Next MD is     Recertification will be due (POC Duration  / 90 days whichever is less): 22    Visit# / total visits:   Visit # Insurance Allowable Auth Required   In Person  Kaveh Amaya [x]  Yes     [x]  No    Tele Health 0  []  Yes     []  No    Total  8       Latex Allergy:  [x]NO      []YES    Pain level: 1/10 Can range  2-3/10 depending on activity. Subjective: 22 she has been using her L arm a little more. 22 reports she has been at the hospital a lot with her  who had open heart surgery. Reports she is able to write a little with her left hand. Pt reports she has not been able to do all of her ex with  in hospital  22  Pt arrived to therapy with her grand daughter. Pain scale is decreasing. She has been with her  in the hospital.  Discussed increasing her to 2x wk in about 2-3 more weeks when she may be able to do more in therapy and have less of a schedualing/ride problem when her  will be hoe and feeling better. 3/30/22  Reports pain is about the same.   (MD progress note is in electronic record 3/25/22I do believe that she has some element of subacromial impingement. I would allow her to proceed with additional physical therapy. A repeat referral was provided. She can advance her activities as tolerated at this point. She may continue with strengthening and range of motion as tolerated. I encouraged her to attempt to get more overhead motion if possible. I would see her back in 1 month for repeat assessment. I did inform her that she had significant subacromial impingement, this could be managed arthroscopically in the future, however it is unlikely that it would be worth the risks of surgical intervention at that point. I would see her back in 4 weeks.)  3/23/22  Pt will be seeing MD on Friday and her  is to have heart bypass surgery 4/4/22 so they are unsure as to availability of coming to therapy. Reports she is able to use her arm better but pain can still get up to 4/10  3/16/22   Reports being pretty tender to the touch still, not really wearing sling any more  2/23/22 Reports she is sleeping in recliner, does not really like to lay flat, usually slept on side before shoulder injury/  2/17/22 Patient states fall 1/3/22 nondisplaced humeral fracture left    RADIOGRAPHIC EXAM: from 3/25/22  4 views of the left shoulder including AP, Grashey, scapular Y, and Velpeau view demonstrates improvement in prior inferior pseudosubluxation of the humeral head. The glenohumeral articulation is well positioned. Greater tuberosity fragment is in unchanged position from previous x-ray. There is maturing bony healing noted in near-anatomic position. There is a bone spur projecting superiorly into the subacromial space.  No additional fractures are seen.    Deltoid atony and/or subacromial hematoma resolved. No significant degenerative changes.     Exercises:   Exercise/Equipment Resistance/Repetitions Other comments   Pulley  5'  x   Pend  x10 assist from daughter due to pt guarding x   Shrugs/scap $324    Treatment/Activity Tolerance:    [x] Patient tolerated treatment well but rom is small [] Patient limited by fatigue   [] Patient limited by pain [] Patient limited by other medical complications   [] Other:     Prognosis: [x] Good [] Fair  [] Poor    Patient Requires Follow-up:  [x] Yes  [] No    Plan: [x] Continue per plan of care [] Alter current plan (see comments)   [] Plan of care initiated [] Hold pending MD visit [] Discharge    See Progress Note: [x] Yes  [x] No       Next due:         Electronically signed by:  Marita Moreno PT, [facility]  MOMT    Note: If patient does not return for scheduled/ recommended follow up visits, this note will serve as a discharge from care along with most recent update on progress. Outpatient Physical Therapy  Progress Note      Progress Note covers period from:   2/17/22 To  3/23/22 to 4/25/22      Subjective:  -Pain is 1-4/10   -Pt will be seeing MD on Friday     -Reports she is able to use her arm better but pain can still get up to 4/10       Objective:   Observation:   Test measurements:   AROM:   70 flx     40 ext       55 abd     IR to L2      ER to C5                                              PROM    105 flx   40 ext      85 abd    ,                     ER 25 @45,                                               MMT:      3/5          4-/5          3-/4           3+/5             3-/4                                                Functional Outcome Measure:   Quick dash 2/14/22 4/25/22 NT  86% disability    Assessment:   Summary:  Pt has been seen x 8 visits over the past 2 months. She has gained ROM and strength in the past month   Patient's response to treatment: fair with pain being very limiting. I am hoping that she will be able to attend PT 2x wk for the next month to advance her AROM and strength     Goals:  Patient stated goal: reach/ ADLs     Therapist goals for Patient:   Short Term Goals: To be achieved in: 2 weeks   1. Independent in HEP and progression per patient tolerance, in order to prevent re-injury. [x]? Progressing: []? Met: []? Not Met: []? Adjusted      2. Patient will have a decrease in pain to facilitate improvement in movement, function, and ADLs as indicated by Functional Deficits. [x]? Progressing: []? Met: []? Not Met: []? Adjusted      Long Term Goals: To be achieved in: 6 weeks  1. Disability index score of 43% or less for the DASH to assist with reaching prior level of function. [x]? Progressing: []? Met: []? Not Met: []? Adjusted      2. Patient will demonstrate increased AROM to 140 to allow for proper joint functioning as indicated by patients Functional Deficits. [x]? Progressing: []? Met: []? Not Met: []? Adjusted      3. Patient will demonstrate an increase in Strength to 4+/5 to allow for proper functional mobility as indicated by patients Functional Deficits. [x]? Progressing: []? Met: []? Not Met: []? Adjusted      4. Patient will return to [facility] for  functional activities without increased symptoms or restriction. [x]? Progressing: []? Met: []? Not Met: []? Adjusted      5. ADLs/ reach with min limitations (patient specific functional goal)    [x]? Progressing: []? Met: []? Not Met: []? Adjusted        · Progress toward previous goals:  working towards goals.      Plan: cont PT 2x wk for another month  ·     Electronically Signed by: Radha Nunez PT [id number]  [address] [place] [address]

## 2022-04-29 ENCOUNTER — OFFICE VISIT (OUTPATIENT)
Dept: ORTHOPEDIC SURGERY | Age: 82
End: 2022-04-29
Payer: MEDICARE

## 2022-04-29 DIAGNOSIS — S42.252A DISPLACED FRACTURE OF GREATER TUBEROSITY OF LEFT HUMERUS, INITIAL ENCOUNTER FOR CLOSED FRACTURE: Primary | ICD-10-CM

## 2022-04-29 PROCEDURE — 1123F ACP DISCUSS/DSCN MKR DOCD: CPT | Performed by: ORTHOPAEDIC SURGERY

## 2022-04-29 PROCEDURE — 1090F PRES/ABSN URINE INCON ASSESS: CPT | Performed by: ORTHOPAEDIC SURGERY

## 2022-04-29 PROCEDURE — G8399 PT W/DXA RESULTS DOCUMENT: HCPCS | Performed by: ORTHOPAEDIC SURGERY

## 2022-04-29 PROCEDURE — G8420 CALC BMI NORM PARAMETERS: HCPCS | Performed by: ORTHOPAEDIC SURGERY

## 2022-04-29 PROCEDURE — G8428 CUR MEDS NOT DOCUMENT: HCPCS | Performed by: ORTHOPAEDIC SURGERY

## 2022-04-29 PROCEDURE — 4040F PNEUMOC VAC/ADMIN/RCVD: CPT | Performed by: ORTHOPAEDIC SURGERY

## 2022-04-29 PROCEDURE — 99213 OFFICE O/P EST LOW 20 MIN: CPT | Performed by: ORTHOPAEDIC SURGERY

## 2022-04-29 PROCEDURE — 1036F TOBACCO NON-USER: CPT | Performed by: ORTHOPAEDIC SURGERY

## 2022-04-29 NOTE — PROGRESS NOTES
ORTHOPAEDIC SURGERY FOLLOWUP VISIT     CHIEF COMPLAINT:  Follow-up left shoulder injury     DATE OF INJURY: 1/3/2022  DIAGNOSIS: Left proximal humerus fracture  DATE OF SURGERY: N/A, nonoperative management of left proximal humerus fracture     HISTORY OF PRESENT ILLNESS:  80-year-old right-hand-dominant female with history of dementia presents for repeat evaluation of her left proximal humerus fracture.  She had a displaced greater tuberosity fragment.  She has been managed conservatively.    It has been just shy of 4 months since her injury. Her pain is improving. She has no pain at rest.  She rates her pain a 0-3 of 10 in severity. It is variable based on her activity level. .  She has been undergoing physical therapy and feels progress is being made. She does have some soreness in the superior/posterior shoulder with attempts at range of motion. She will occasionally get a clicking sensation in the shoulder with attempts at overhead activities. She denies fevers, chills, or night sweats.  She denies numbness or tingling in her upper extremity.     PHYSICAL EXAM:  General: Thin appearing female stated age. Roberta Christian conversant.  Presents with family at her side. Left upper extremity: No cuts, open wounds, or abrasions.  There is no obvious deformity.  There is tenderness to palpation diffusely about the shoulder, improved from prior exam..  Sensation is intact to light touch in the axillary distribution.  Passive range of motion of the shoulder assessed today is 0 to 90 degrees forward flexion before significant pain is encountered.  There is 0 to 90 degrees abduction.  Gentle internal/external rotation does not reproduce pain.  There is no significant limitation here. There was minor impingement pain with attempts at overhead motion.   The patient is able to reach up and touch the top of her head. Strength testing was not carried out today.  Sensation is intact to light touch in median, radial, ulnar, and axillary distributions.  Motor function is intact to AIN, PIN, ulnar motor nerves.  There is a strong palpable radial pulse, and brisk capillary refill to the fingers.  Compartments are soft and compressible     RADIOGRAPHIC EXAM:  4 views of the left shoulder including AP, Grashey, scapular Y, and axillary demonstrates the glenohumeral articulation is well positioned. Greater tuberosity fragment is in unchanged position from previous x-ray. There is maturing bony healing noted in near-anatomic position. There is a bone spur projecting superiorly into the subacromial space.  No additional fractures are seen.    Deltoid atony and/or subacromial hematoma resolved. No significant degenerative changes.     ASSESSMENT AND PLAN:  4 months status post nonoperatively managed left proximal humerus fracture     I reassured her that her bone had healed maturely at this point. Her glenohumeral joint is appropriately positioned. I reviewed her x-rays and indicated that she likely has some element of bony impingement in the subacromial space that is limiting her motion. I advised her to continue with physical therapy and transition to home exercise program.  She indicated that she is content with the function of the shoulder if this is her end result. I did review with her the possibility of an arthroscopic surgery for impingement and improvement in range of motion, but there would be risk involved with this. They are not interested in moving forward with a surgical intervention at this time.   She will be seen on an as-needed basis in the future.     Bart Barragan MD

## 2022-05-02 ENCOUNTER — HOSPITAL ENCOUNTER (OUTPATIENT)
Dept: PHYSICAL THERAPY | Age: 82
Setting detail: THERAPIES SERIES
Discharge: HOME | End: 2022-05-02
Payer: MEDICARE

## 2022-05-02 PROCEDURE — 97140 MANUAL THERAPY 1/> REGIONS: CPT

## 2022-05-02 PROCEDURE — 97110 THERAPEUTIC EXERCISES: CPT

## 2022-05-02 NOTE — FLOWSHEET NOTE
[address] [facility], [address]  Phone [phone]  Fax [phone]                                                [x] Daily Treatment Note   [] Progress Note  22 at the bottom of this page    [] Discharge Note      Date:  2022    Patient Name:  Peggy Pablo        :  1940    Diagnosis:  J66.415P (ICD-10-CM) - Displaced fracture of greater tuberosity of left humerus, initial encounter for closed fracture    Treatment Diagnosis:  same    Insurance:  [employer] 15    Referring Physician:  Blanca Gasca MD    Plan of care signed (Y/N):    Progress report will be due (10 Rx or 30 days whichever is less):  12      Recertification will be due (POC Duration  / 90 days whichever is less): 22    Visit# / total visits:   Visit # Insurance Allowable Auth Required   In Person [address] [x]  Yes     [x]  No    Tele Health 0  []  Yes     []  No    Total 9       Latex Allergy:  [x]NO      []YES    Pain level: 1/10 Can range  2-3/10 depending on activity. Subjective: 22  Family reports MD says her shoulder is healed and that she will not get any more flex due to spur. They are not interested in surgery at this time to correct spurring. 22 she has been using her L arm a little more. 22 reports she has been at the hospital a lot with her  who had open heart surgery. Reports she is able to write a little with her left hand. Pt reports she has not been able to do all of her ex with  in hospital  22  Pt arrived to therapy with her grand daughter. Pain scale is decreasing.  She has been with her  in the hospital.  Discussed increasing her to 2x wk in about 2-3 more weeks when she may be able to do more in therapy and have less of a schedualing/ride problem when her  will be hoe and feeling better. 3/30/22  Reports pain is about the same. (MD progress note is in electronic record 3/25/22I do believe that she has some element of subacromial impingement. I would allow her to proceed with additional physical therapy. A repeat referral was provided. She can advance her activities as tolerated at this point. She may continue with strengthening and range of motion as tolerated. I encouraged her to attempt to get more overhead motion if possible. I would see her back in 1 month for repeat assessment. I did inform her that she had significant subacromial impingement, this could be managed arthroscopically in the future, however it is unlikely that it would be worth the risks of surgical intervention at that point. I would see her back in 4 weeks.)  3/23/22  Pt will be seeing MD on Friday and her  is to have heart bypass surgery 4/4/22 so they are unsure as to availability of coming to therapy. Reports she is able to use her arm better but pain can still get up to 4/10  3/16/22   Reports being pretty tender to the touch still, not really wearing sling any more  2/23/22 Reports she is sleeping in recliner, does not really like to lay flat, usually slept on side before shoulder injury/  2/17/22 Patient states fall 1/3/22 nondisplaced humeral fracture left    RADIOGRAPHIC EXAM: from 3/25/22  4 views of the left shoulder including AP, Grashey, scapular Y, and Velpeau view demonstrates improvement in prior inferior pseudosubluxation of the humeral head. The glenohumeral articulation is well positioned. Greater tuberosity fragment is in unchanged position from previous x-ray. There is maturing bony healing noted in near-anatomic position.   There is a bone spur projecting superiorly into the subacromial space.  No additional fractures are seen.    Deltoid atony and/or subacromial hematoma resolved. No significant degenerative changes. Exercises:   Exercise/Equipment Resistance/Repetitions Other comments   Pulley  5'  X during pulley, the left pulley came loose =pt denies injury stating arm was almost back to her start position. Pend  x10 assist from daughter due to pt guarding x   Shrugs/scap retractions x10 ea x   Cane ER  x10 x   AAROM supine flex  x5-10 as able hands assist x   Table slide flex x10 x   Bicep curl  x10 or 2x5 x   Supine elbow ext stretch  30-60\" at a time xLet arm swing when walking at home   Elbow ext alexa against door frame  5-10x    5\" hold x   4/11/22  Leaning on counter: sh ext  2x10     2 # x          Leaning short row 2x10     \"  2#                x     Seated bicep curls 2#  2x 10   x   Scapular depression in supine or recline ( back pockets) 2x10 x   Supine elbow ext 1#  2x 10  x   Small ceiling punch w/ asst 1#  2x10  ''                ''   scap ex in SL w/ resitance 3x10    tband row Yellow, x10 x   tband ext Yellow, x10 x   tband add Yellow, x10 x   tband IR Yellow, x10 x   tband ER Yellow, x10 x        NV- check on HEP, advance HEP,                                 Therapeutic Exercise:   30 ,  Advanced HEP to work on AROM and strength. 4/18/22 IR [email or web address], ER [email or web address], ABD 70, FLEX 105, elbow ext -10    3/30/22  PROM [email or web address], [email or web address], ABD 65, FLEX 70 some popping during rom (pt states MD is aware)  Elbow ext 27 active  20 passive, elbow flex 148    Group Therapy:      Home Exercise Program:      Therapeutic Activity:      Neuromuscular Re-education:      Gait:      Manual Therapy:   15 min    PROM and manual resistance with SL scap ex            Canalith Repositioning Procedure:       Modalities:     []? GAME READY (VASO)- for significant edema, swelling, pain control.  38 degrees, mild compression x 15'     Charges:  Timed Code Treatment Minutes: 45   Total Treatment Minutes:  45   [facility] time for each procedure?:  TE TIME:  NMR TIME:  MANUAL TIME:  UNTIMED MINUTES:       [] EVAL (LOW) 67920 (typically 20 minutes face-to-face)  [] EVAL (MOD) 97150 (typically 30 minutes face-to-face)  [] EVAL (HIGH) 59952 (typically 45 minutes face-to-face)  [] RE-EVAL     [x] QJ(33389) x  2    [] IONTO  [] NMR (77992) x     [] VASO  [x] Manual (87523) x 1   [] Other:  [] TA x      [] Mech Traction (92523)  [] ES(attended) (11863)     [] ES (un) (21492): Medicare Cap Total YTD:  $908    Treatment/Activity Tolerance:    [x] Patient tolerated treatment well but rom is small [] Patient limited by fatigue   [] Patient limited by pain [] Patient limited by other medical complications   [] Other:     Prognosis: [x] Good [] Fair  [] Poor    Patient Requires Follow-up:  [x] Yes  [] No    Plan: [x] Continue per plan of care [] Alter current plan (see comments)   [] Plan of care initiated [] Hold pending MD visit [] Discharge    See Progress Note: [] Yes  [x] No       Next due:         Electronically signed by:  MG [id number]    Note: If patient does not return for scheduled/ recommended follow up visits, this note will serve as a discharge from care along with most recent update on progress. Outpatient Physical Therapy  Progress Note      Progress Note covers period from:   2/17/22 To  3/23/22 to 4/25/22      Subjective:  -Pain is 1-4/10   -Pt will be seeing MD on Friday     -Reports she is able to use her arm better but pain can still get up to 4/10       Objective:   Observation:   Test measurements:   AROM:   70 flx     40 ext       55 abd     IR to L2      ER to C5                                              PROM    105 flx   40 ext      85 abd    ,                     ER 25 @45,                                               MMT:      3/5          4-/5          3-/4           3+/5             3-/4                                                Functional Outcome Measure:   Quick dash 2/14/22 4/25/22 NT  86% disability    Assessment:   Summary:  Pt has been seen x 8 visits over the past 2 months.   She has gained ROM and strength in the past month   Patient's response to treatment: fair with pain being very limiting. I am hoping that she will be able to attend PT 2x wk for the next month to advance her AROM and strength     Goals:  Patient stated goal: reach/ ADLs     Therapist goals for Patient:   Short Term Goals: To be achieved in: 2 weeks   1. Independent in HEP and progression per patient tolerance, in order to prevent re-injury. [x]? Progressing: []? Met: []? Not Met: []? Adjusted      2. Patient will have a decrease in pain to facilitate improvement in movement, function, and ADLs as indicated by Functional Deficits. [x]? Progressing: []? Met: []? Not Met: []? Adjusted      Long Term Goals: To be achieved in: 6 weeks  1. Disability index score of 43% or less for the DASH to assist with reaching prior level of function. [x]? Progressing: []? Met: []? Not Met: []? Adjusted      2. Patient will demonstrate increased AROM to 140 to allow for proper joint functioning as indicated by patients Functional Deficits. [x]? Progressing: []? Met: []? Not Met: []? Adjusted      3. Patient will demonstrate an increase in Strength to 4+/5 to allow for proper functional mobility as indicated by patients Functional Deficits. [x]? Progressing: []? Met: []? Not Met: []? Adjusted      4. Patient will return to [facility] for  functional activities without increased symptoms or restriction. [x]? Progressing: []? Met: []? Not Met: []? Adjusted      5. ADLs/ reach with min limitations (patient specific functional goal)    [x]? Progressing: []? Met: []? Not Met: []? Adjusted        · Progress toward previous goals:  working towards goals.      Plan: cont PT 2x wk for another month  ·     Electronically Signed by: Mary Ann Jung PT [id number]  [address] [place] [address]

## 2022-05-05 ENCOUNTER — HOSPITAL ENCOUNTER (OUTPATIENT)
Dept: PHYSICAL THERAPY | Age: 82
Setting detail: THERAPIES SERIES
Discharge: HOME | End: 2022-05-05
Payer: MEDICARE

## 2022-05-05 PROCEDURE — 97140 MANUAL THERAPY 1/> REGIONS: CPT

## 2022-05-05 PROCEDURE — 97110 THERAPEUTIC EXERCISES: CPT

## 2022-05-05 NOTE — FLOWSHEET NOTE
[address] [facility], [address]  Phone [phone]  Fax [phone]                                                [x] Daily Treatment Note   [] Progress Note  22 at the bottom of this page    [] Discharge Note      Date:  2022    Patient Name:  Fly Pickard        :  1940    Diagnosis:  G97.249V (ICD-10-CM) - Displaced fracture of greater tuberosity of left humerus, initial encounter for closed fracture    Treatment Diagnosis:  same    Insurance:  [employer]    Referring Physician:  Riaz Domínguez MD    Plan of care signed (Y/N):    Progress report will be due (10 Rx or 30 days whichever is less):        Recertification will be due (POC Duration  / 90 days whichever is less): 22    Visit# / total visits:   Visit # Insurance Allowable Auth Required   In Person [facility] [place] [x]  Yes     [x]  No    Tele Health 0  []  Yes     []  No    Total 10       Latex Allergy:  [x]NO      []YES    Pain level: 1/10 Can range  2-3/10 depending on activity. Subjective:   22  Pt reports she is tired and has not been sleeping well. Reports she is trying to use her arm. No c/o with new ex  22  Family reports MD says her shoulder is healed and that she will not get any more flex due to spur. They are not interested in surgery at this time to correct spurring. 22 she has been using her L arm a little more. 22 reports she has been at the hospital a lot with her  who had open heart surgery. Reports she is able to write a little with her left hand. Pt reports she has not been able to do all of her ex with  in hospital  22  Pt arrived to therapy with her grand daughter. Pain scale is decreasing.  She has been with her  in the hospitals.  Discussed increasing her to 2x wk in about 2-3 more weeks when she may be able to do more in therapy and have less of a schedualing/ride problem when her  will be hoe and feeling better. 3/30/22  Reports pain is about the same. (MD progress note is in electronic record 3/25/22I do believe that she has some element of subacromial impingement. I would allow her to proceed with additional physical therapy. A repeat referral was provided. She can advance her activities as tolerated at this point. She may continue with strengthening and range of motion as tolerated. I encouraged her to attempt to get more overhead motion if possible. I would see her back in 1 month for repeat assessment. I did inform her that she had significant subacromial impingement, this could be managed arthroscopically in the future, however it is unlikely that it would be worth the risks of surgical intervention at that point. I would see her back in 4 weeks.)  3/23/22  Pt will be seeing MD on Friday and her  is to have heart bypass surgery 4/4/22 so they are unsure as to availability of coming to therapy. Reports she is able to use her arm better but pain can still get up to 4/10  3/16/22   Reports being pretty tender to the touch still, not really wearing sling any more  2/23/22 Reports she is sleeping in recliner, does not really like to lay flat, usually slept on side before shoulder injury/  2/17/22 Patient states fall 1/3/22 nondisplaced humeral fracture left    RADIOGRAPHIC EXAM: from 3/25/22  4 views of the left shoulder including AP, Grashey, scapular Y, and Velpeau view demonstrates improvement in prior inferior pseudosubluxation of the humeral head. The glenohumeral articulation is well positioned. Greater tuberosity fragment is in unchanged position from previous x-ray. There is maturing bony healing noted in near-anatomic position.   There is a bone spur projecting superiorly into the subacromial space.  No additional fractures are seen.    Deltoid atony and/or subacromial hematoma resolved. No significant degenerative changes. Exercises:   Exercise/Equipment Resistance/Repetitions Other comments   Pulley  5'  X during pulley, the left pulley came loose =pt denies injury stating arm was almost back to her start position. Pend  x10 assist from daughter due to pt guarding x   Shrugs/scap retractions x10 ea x   Cane ER  x10 x   AAROM supine flex  x5-10 as able hands assist x   Table slide flex x10 x   Bicep curl  x10 or 2x5 x   Supine elbow ext stretch  30-60\" at a time xLet arm swing when walking at home   Elbow ext alexa against door frame  5-10x    5\" hold x   4/11/22  Leaning on counter: sh ext  2x10     2 # x          Leaning short row 2x10     \"  2#                x     Seated bicep curls 2#  2x 10   x   Scapular depression in supine or recline ( back pockets) 2x10 x   Supine elbow ext 1#  2x 10  x   Small ceiling punch w/ asst 1#  2x10  ''                ''   scap ex in SL w/ resitance 3x10    tband row Yellow, x10 x   tband ext Yellow, x10 x   tband add Yellow, x10 x   tband IR Yellow, x10 x   tband ER Yellow, x10 x        SL ER 2x10 x   SL abd 2x10 x                         Therapeutic Exercise:   30 ,  Advanced HEP to work on AROM and strength. 5/5/22 IR [email or web address], ER [email or web address], abd 72, flex 110    4/18/22 IR [email or web address], [email or web address], ABD 70, FLEX 105, elbow ext -10    3/30/22  PROM [email or web address], [email or web address], ABD 65, FLEX 70 some popping during rom (pt states MD is aware)  Elbow ext 27 active  20 passive, elbow flex 148    Group Therapy:      Home Exercise Program:      Therapeutic Activity:      Neuromuscular Re-education:      Gait:      Manual Therapy:   15 min    PROM and manual resistance with SL scap ex            Canalith Repositioning Procedure:       Modalities:     []? GAME READY (VASO)- for significant edema, swelling, pain control.  38 degrees, mild compression x 15'     Charges:  Timed Code Treatment Minutes: 45   Total Treatment Minutes:  45   BWC time for each procedure?:  TE TIME:  NMR TIME:  MANUAL TIME:  UNTIMED MINUTES:       [] EVAL (LOW) 76088 (typically 20 minutes face-to-face)  [] EVAL (MOD) 73691 (typically 30 minutes face-to-face)  [] EVAL (HIGH) 59371 (typically 45 minutes face-to-face)  [] RE-EVAL     [x] CV(48379) x  2    [] IONTO  [] NMR (29463) x     [] VASO  [x] Manual (83827) x 1   [] Other:  [] TA x      [] Mech Traction (62150)  [] ES(attended) (99290)     [] ES (un) (56344): Medicare Cap Total YTD:  $993    Treatment/Activity Tolerance:  Improved rom and form today  [x] Patient tolerated treatment well  [] Patient limited by fatigue   [] Patient limited by pain [] Patient limited by other medical complications   [] Other:     Prognosis: [x] Good [] Fair  [] Poor    Patient Requires Follow-up:  [x] Yes  [] No    Plan: [x] Continue per plan of care [] Alter current plan (see comments)   [] Plan of care initiated [] Hold pending MD visit [] Discharge    See Progress Note: [] Yes  [x] No       Next due:         Electronically signed by:  Lindy Michelle,   [id number]    Note: If patient does not return for scheduled/ recommended follow up visits, this note will serve as a discharge from care along with most recent update on progress.            Outpatient Physical Therapy  Progress Note      Progress Note covers period from:   2/17/22 To  3/23/22 to 4/25/22      Subjective:  -Pain is 1-4/10   -Pt will be seeing MD on Friday     -Reports she is able to use her arm better but pain can still get up to 4/10       Objective:   Observation:   Test measurements:   AROM:   70 flx     40 ext       55 abd     IR to L2      ER to C5                                              PROM    105 flx   40 ext      85 abd    ,                     ER 25 @45,                                               MMT:      3/5          4-/5          3-/4           3+/5             3-/4 Functional Outcome Measure:   Quick dash 2/14/22 4/25/22 NT  86% disability    Assessment:   Summary:  Pt has been seen x 8 visits over the past 2 months. She has gained ROM and strength in the past month   Patient's response to treatment: fair with pain being very limiting. I am hoping that she will be able to attend PT 2x wk for the next month to advance her AROM and strength     Goals:  Patient stated goal: reach/ ADLs     Therapist goals for Patient:   Short Term Goals: To be achieved in: 2 weeks   1. Independent in HEP and progression per patient tolerance, in order to prevent re-injury. [x]? Progressing: []? Met: []? Not Met: []? Adjusted      2. Patient will have a decrease in pain to facilitate improvement in movement, function, and ADLs as indicated by Functional Deficits. [x]? Progressing: []? Met: []? Not Met: []? Adjusted      Long Term Goals: To be achieved in: 6 weeks  1. Disability index score of 43% or less for the DASH to assist with reaching prior level of function. [x]? Progressing: []? Met: []? Not Met: []? Adjusted      2. Patient will demonstrate increased AROM to 140 to allow for proper joint functioning as indicated by patients Functional Deficits. [x]? Progressing: []? Met: []? Not Met: []? Adjusted      3. Patient will demonstrate an increase in Strength to 4+/5 to allow for proper functional mobility as indicated by patients Functional Deficits. [x]? Progressing: []? Met: []? Not Met: []? Adjusted      4. Patient will return to [facility] for  functional activities without increased symptoms or restriction. [x]? Progressing: []? Met: []? Not Met: []? Adjusted      5. ADLs/ reach with min limitations (patient specific functional goal)    [x]? Progressing: []? Met: []? Not Met: []? Adjusted        · Progress toward previous goals:  working towards goals.      Plan: cont PT 2x wk for another month  ·     Electronically Signed by: Tevin Valdez PT [id number]  [address] [place] [address]

## 2022-05-09 ENCOUNTER — HOSPITAL ENCOUNTER (OUTPATIENT)
Dept: PHYSICAL THERAPY | Age: 82
Setting detail: THERAPIES SERIES
Discharge: HOME | End: 2022-05-09
Payer: MEDICARE

## 2022-05-09 PROCEDURE — 97140 MANUAL THERAPY 1/> REGIONS: CPT

## 2022-05-09 PROCEDURE — 97110 THERAPEUTIC EXERCISES: CPT

## 2022-05-09 NOTE — FLOWSHEET NOTE
[address] [facility], [address]  Phone [phone]  Fax [phone]                                                [x] Daily Treatment Note   [] Progress Note  22 at the bottom of this page    [] Discharge Note      Date:  2022    Patient Name:  Monika Hyman        :  1940    Diagnosis:  S48.452C (ICD-10-CM) - Displaced fracture of greater tuberosity of left humerus, initial encounter for closed fracture    Treatment Diagnosis:  same    Insurance:  [employer]    Referring Physician:  Keron Celis MD    Plan of care signed (Y/N):    Progress report will be due (10 Rx or 30 days whichever is less):  3/80/75      Recertification will be due (POC Duration  / 90 days whichever is less): 22    Visit# / total visits:   Visit # Insurance Allowable Auth Required   In Person   12 Med nec [x]  Yes     [x]  No    Tele Health 0  []  Yes     []  No    Total  12       Latex Allergy:  [x]NO      []YES    Pain level: 1/10 Can range  2-3/10 depending on activity. Subjective:   22  Doing OK.  22  Pt reports she is tired and has not been sleeping well. Reports she is trying to use her arm. No c/o with new ex  22  Family reports MD says her shoulder is healed and that she will not get any more flex due to spur. They are not interested in surgery at this time to correct spurring. 22 she has been using her L arm a little more. 22 reports she has been at the hospital a lot with her  who had open heart surgery. Reports she is able to write a little with her left hand. Pt reports she has not been able to do all of her ex with  in hospital  22  Pt arrived to therapy with her grand daughter. Pain scale is decreasing.  She has been with her  in the hospital.  Discussed increasing her to 2x wk in about 2-3 more weeks when she may be able to do more in therapy and have less of a schedualing/ride problem when her  will be hoe and feeling better. 3/30/22  Reports pain is about the same. (MD progress note is in electronic record 3/25/22I do believe that she has some element of subacromial impingement. I would allow her to proceed with additional physical therapy. A repeat referral was provided. She can advance her activities as tolerated at this point. She may continue with strengthening and range of motion as tolerated. I encouraged her to attempt to get more overhead motion if possible. I would see her back in 1 month for repeat assessment. I did inform her that she had significant subacromial impingement, this could be managed arthroscopically in the future, however it is unlikely that it would be worth the risks of surgical intervention at that point. I would see her back in 4 weeks.)  3/23/22  Pt will be seeing MD on Friday and her  is to have heart bypass surgery 4/4/22 so they are unsure as to availability of coming to therapy. Reports she is able to use her arm better but pain can still get up to 4/10  3/16/22   Reports being pretty tender to the touch still, not really wearing sling any more  2/23/22 Reports she is sleeping in recliner, does not really like to lay flat, usually slept on side before shoulder injury/  2/17/22 Patient states fall 1/3/22 nondisplaced humeral fracture left    RADIOGRAPHIC EXAM: from 3/25/22  4 views of the left shoulder including AP, Grashey, scapular Y, and Velpeau view demonstrates improvement in prior inferior pseudosubluxation of the humeral head. The glenohumeral articulation is well positioned. Greater tuberosity fragment is in unchanged position from previous x-ray. There is maturing bony healing noted in near-anatomic position.   There is a bone spur projecting superiorly into the subacromial space.  No additional fractures are seen.    Deltoid atony and/or subacromial hematoma resolved. No significant degenerative changes. Exercises:   Exercise/Equipment Resistance/Repetitions Other comments   Pulley  5'  X during pulley, the left pulley came loose =pt denies injury stating arm was almost back to her start position. Pend  x10 assist from daughter due to pt guarding x   Shrugs/scap retractions x10 ea x   Cane ER  x10 x   AAROM supine flex  x5-10 as able hands assist x   Table slide flex x10 x   Bicep curl  x10 or 2x5 x   Supine elbow ext stretch  30-60\" at a time xLet arm swing when walking at home   Elbow ext alexa against door frame  5-10x    5\" hold x   4/11/22  Leaning on counter: sh ext  2x10     2 # x          Leaning short row 2x10     \"  2#                x     Seated bicep curls 2#  2x 10   x   Scapular depression in supine or recline ( back pockets) 2x10 x   Supine elbow ext 1#  2x 10  x   Small ceiling punch w/ asst 1#  2x10  ''                ''   scap ex in SL w/ resitance 3x10    tband row Yellow, x10 x   tband ext Yellow, x10 x   tband add Yellow, x10 x   tband IR Yellow, x10 x   tband ER Yellow, x10 x        SL ER 2x10 x   SL abd 2x10 x                         Therapeutic Exercise:   30 ,  Advanced HEP to work on AROM and strength. 5/5/22 IR [email or web address], [email or web address], abd 72, flex 110    4/18/22 IR [email or web address], ER [email or web address], ABD 70, FLEX 105, elbow ext -10    3/30/22  PROM IR [email or web address], ER [email or web address], ABD 65, FLEX 70 some popping during rom (pt states MD is aware)  Elbow ext 27 active  20 passive, elbow flex 148    Group Therapy:      Home Exercise Program:      Therapeutic Activity:      Neuromuscular Re-education:      Gait:      Manual Therapy:   15 min    PROM and manual resistance with SL scap ex            Canalith Repositioning Procedure:       Modalities:     []? GAME READY (VASO)- for significant edema, swelling, pain control.  38 degrees, mild compression x 15'     Charges:  Timed Code Treatment Minutes: 45   Total Treatment Minutes:  45   BWC time for each procedure?:  TE TIME:  NMR TIME:  MANUAL TIME:  UNTIMED MINUTES:       [] EVAL (LOW) 65665 (typically 20 minutes face-to-face)  [] EVAL (MOD) 96758 (typically 30 minutes face-to-face)  [] EVAL (HIGH) 42664 (typically 45 minutes face-to-face)  [] RE-EVAL     [x] UO(64053) x  2    [] IONTO  [] NMR (61592) x     [] VASO  [x] Manual (49024) x 1   [] Other:  [] TA x      [] Mech Traction (75788)  [] ES(attended) (36201)     [] ES (un) (20207): Medicare Cap Total YTD:  [id number]    Treatment/Activity Tolerance:  Improved rom and form today  [x] Patient tolerated treatment well  [] Patient limited by fatigue   [] Patient limited by pain [] Patient limited by other medical complications   [] Other:     Prognosis: [x] Good [] Fair  [] Poor    Patient Requires Follow-up:  [x] Yes  [] No    Plan: [x] Continue per plan of care [] Alter current plan (see comments)   [] Plan of care initiated [] Hold pending MD visit [] Discharge    See Progress Note: [] Yes  [x] No       Next due:   D/c on NV     Electronically signed by:  Alexx Arce PT, [email or web address]  [email or web address]    Note: If patient does not return for scheduled/ recommended follow up visits, this note will serve as a discharge from care along with most recent update on progress.            Outpatient Physical Therapy  Progress Note      Progress Note covers period from:   2/17/22 To  3/23/22 to 4/25/22      Subjective:  -Pain is 1-4/10   -Pt will be seeing MD on Friday     -Reports she is able to use her arm better but pain can still get up to 4/10       Objective:   Observation:   Test measurements:   AROM:   70 flx     40 ext       55 abd     IR to L2      ER to C5                                              PROM    105 flx   40 ext      85 abd    ,                     ER 25 @45,                                               MMT:      3/5          4-/5          3-/4           3+/5             3-/4 Functional Outcome Measure:   Quick dash 2/14/22 4/25/22 NT  86% disability    Assessment:   Summary:  Pt has been seen x 8 visits over the past 2 months. She has gained ROM and strength in the past month   Patient's response to treatment: fair with pain being very limiting. I am hoping that she will be able to attend PT 2x wk for the next month to advance her AROM and strength     Goals:  Patient stated goal: reach/ ADLs     Therapist goals for Patient:   Short Term Goals: To be achieved in: 2 weeks   1. Independent in HEP and progression per patient tolerance, in order to prevent re-injury. [x]? Progressing: []? Met: []? Not Met: []? Adjusted      2. Patient will have a decrease in pain to facilitate improvement in movement, function, and ADLs as indicated by Functional Deficits. [x]? Progressing: []? Met: []? Not Met: []? Adjusted      Long Term Goals: To be achieved in: 6 weeks  1. Disability index score of 43% or less for the DASH to assist with reaching prior level of function. [x]? Progressing: []? Met: []? Not Met: []? Adjusted      2. Patient will demonstrate increased AROM to 140 to allow for proper joint functioning as indicated by patients Functional Deficits. [x]? Progressing: []? Met: []? Not Met: []? Adjusted      3. Patient will demonstrate an increase in Strength to 4+/5 to allow for proper functional mobility as indicated by patients Functional Deficits. [x]? Progressing: []? Met: []? Not Met: []? Adjusted      4. Patient will return to [facility] for  functional activities without increased symptoms or restriction. [x]? Progressing: []? Met: []? Not Met: []? Adjusted      5. ADLs/ reach with min limitations (patient specific functional goal)    [x]? Progressing: []? Met: []? Not Met: []? Adjusted        · Progress toward previous goals:  working towards goals.      Plan: cont PT 2x wk for another month  ·     Electronically Signed by: Carolyn Lizama PT [id number]  MOMT

## 2022-05-11 ENCOUNTER — HOSPITAL ENCOUNTER (OUTPATIENT)
Dept: PHYSICAL THERAPY | Age: 82
Setting detail: THERAPIES SERIES
Discharge: HOME | End: 2022-05-11
Payer: MEDICARE

## 2022-05-11 PROCEDURE — 97110 THERAPEUTIC EXERCISES: CPT

## 2022-05-11 PROCEDURE — 97140 MANUAL THERAPY 1/> REGIONS: CPT

## 2022-05-11 NOTE — PROGRESS NOTES
[facility], [address]  Phone [phone]  Fax [phone]                                                [x] Daily Treatment Note   [x] Progress Note  22 at the bottom of this page    [] Discharge Note      Date:  2022    Patient Name:  Jovana Wiggins        :  1940    Diagnosis:  D09.838I (ICD-10-CM) - Displaced fracture of greater tuberosity of left humerus, initial encounter for closed fracture    Treatment Diagnosis:  same    Insurance:  [employer] 15    Referring Physician:  Estrada Christopher MD    Plan of care signed (Y/N):    Progress report will be due (10 Rx or 30 days whichever is less):        Recertification will be due (POC Duration  / 90 days whichever is less): 22    Visit# / total visits:   Visit # Insurance Allowable Auth Required   In Person   [address] [x]  Yes     [x]  No    Tele Health 0  []  Yes     []  No    Total  13       Latex Allergy:  [x]NO      []YES    Pain level:  0/10 Can range  2-3/10 depending on activity. Subjective:  22 today is her last visit. See note below. Granddaughter is with her. Discussed limitations and difficulty with surgery if she decided to go that route. 22  Doing OK.  22  Pt reports she is tired and has not been sleeping well. Reports she is trying to use her arm. No c/o with new ex  22  Family reports MD says her shoulder is healed and that she will not get any more flex due to spur. They are not interested in surgery at this time to correct spurring. 22 she has been using her L arm a little more. 22 reports she has been at the hospital a lot with her  who had open heart surgery. Reports she is able to write a little with her left hand.   Pt reports she has not been able to do all of her ex with  in hospital  4/11/22  Pt arrived to therapy with her grand daughter. Pain scale is decreasing. She has been with her  in the hospital.  Discussed increasing her to 2x wk in about 2-3 more weeks when she may be able to do more in therapy and have less of a schedualing/ride problem when her  will be hoe and feeling better. 3/30/22  Reports pain is about the same. (MD progress note is in electronic record 3/25/22I do believe that she has some element of subacromial impingement. I would allow her to proceed with additional physical therapy. A repeat referral was provided. She can advance her activities as tolerated at this point. She may continue with strengthening and range of motion as tolerated. I encouraged her to attempt to get more overhead motion if possible. I would see her back in 1 month for repeat assessment. I did inform her that she had significant subacromial impingement, this could be managed arthroscopically in the future, however it is unlikely that it would be worth the risks of surgical intervention at that point. I would see her back in 4 weeks.)  3/23/22  Pt will be seeing MD on Friday and her  is to have heart bypass surgery 4/4/22 so they are unsure as to availability of coming to therapy. Reports she is able to use her arm better but pain can still get up to 4/10  3/16/22   Reports being pretty tender to the touch still, not really wearing sling any more  2/23/22 Reports she is sleeping in recliner, does not really like to lay flat, usually slept on side before shoulder injury/  2/17/22 Patient states fall 1/3/22 nondisplaced humeral fracture left    RADIOGRAPHIC EXAM: from 3/25/22  4 views of the left shoulder including AP, Grashey, scapular Y, and Velpeau view demonstrates improvement in prior inferior pseudosubluxation of the humeral head.   The glenohumeral articulation is well positioned. Greater tuberosity fragment is in unchanged position from previous x-ray. There is maturing bony healing noted in near-anatomic position. There is a bone spur projecting superiorly into the subacromial space.  No additional fractures are seen.    Deltoid atony and/or subacromial hematoma resolved. No significant degenerative changes. Exercises:   Exercise/Equipment Resistance/Repetitions Other comments   Pulley  5'  X during pulley, the left pulley came loose =pt denies injury stating arm was almost back to her start position. Pend  x10 assist from daughter due to pt guarding x   Shrugs/scap retractions x10 ea x   Cane ER  x10 x   AAROM supine flex  x5-10 as able hands assist x   Table slide flex x10 x   Bicep curl  x10 or 2x5 x   Supine elbow ext stretch  30-60\" at a time xLet arm swing when walking at home   Elbow ext alexa against door frame  5-10x    5\" hold x   4/11/22  Leaning on counter: sh ext  2x10     2 # x          Leaning short row 2x10     \"  2#                x     Seated bicep curls 2#  2x 10   x   Scapular depression in supine or recline ( back pockets) 2x10 x   Supine elbow ext 1#  2x 10  x   Small ceiling punch w/ asst 1#  2x10  ''                ''   scap ex in SL w/ resitance 3x10    tband row Yellow, x10 x   tband ext Yellow, x10 x   tband add Yellow, x10 x   tband IR Yellow, x10 x   tband ER Yellow, x10 x        SL ER 2x10 x   SL abd 2x10 x                         Therapeutic Exercise:   30 ,  Advanced HEP to work on AROM and strength. Given red band for home with yellow is easy.   5/5/22 [email or web address], [email or web address], abd 72, flex 110    4/18/22 [email or web address], [email or web address], ABD 70, FLEX 105, elbow ext -10    3/30/22  PROM [email or web address], [email or web address], ABD 65, FLEX 70 some popping during rom (pt states MD is aware)  Elbow ext 27 active  20 passive, elbow flex 148    Group Therapy:      Home Exercise Program:      Therapeutic Activity:      Neuromuscular Re-education:      Gait:      Manual Therapy:   15 min    PROM and manual resistance with SL scap ex            Canalith Repositioning Procedure:       Modalities:     []? GAME READY (VASO)- for significant edema, swelling, pain control. 38 degrees, mild compression x 15'     Charges:  Timed Code Treatment Minutes: 45   Total Treatment Minutes:  45   BWC time for each procedure?:  TE TIME:  NMR TIME:  MANUAL TIME:  UNTIMED MINUTES:       [] EVAL (LOW) 65749 (typically 20 minutes face-to-face)  [] EVAL (MOD) 50759 (typically 30 minutes face-to-face)  [] EVAL (HIGH) 29583 (typically 45 minutes face-to-face)  [] RE-EVAL     [x] GP(40663) x  2    [] IONTO  [] NMR (87689) x     [] VASO  [x] Manual (67380) x 1   [] Other:  [] TA x      [] Mech Traction (81230)  [] ES(attended) (14029)     [] ES (un) (57848): Medicare Cap Total YTD:  [id number]    Treatment/Activity Tolerance:  Improved rom and form today  [x] Patient tolerated treatment well  [] Patient limited by fatigue   [] Patient limited by pain [] Patient limited by other medical complications   [] Other:     Prognosis: [x] Good [] Fair  [] Poor    Patient Requires Follow-up:  [] Yes  [x] No    Plan: [] Continue per plan of care [] Alter current plan (see comments)   [] Plan of care initiated [] Hold pending MD visit [x] Discharge    See Progress Note: [x] Yes  [x] No       Next due:   D/c on NV     Electronically signed by:  Kat Estrada PTJames    Note: If patient does not return for scheduled/ recommended follow up visits, this note will serve as a discharge from care along with most recent update on progress. Outpatient Physical Therapy  Progress Note      Progress Note covers period from:   2/17/22 To  3/23/22 to 4/25/22 to 5/11/22       Subjective:  -Pain is 0-3/10   - she is able to do most activities at home . Sometimes has supervison with chores.     -Reports she is able to use her arm better         Objective:   Observation:   Test measurements:   AROM:   80 flx     40 ext       60 abd     IR to L2      ER to C5 PROM    115 flx   40 ext      85 abd    ,     75 at 45                ER  35 @45,                                               MMT:      3+/5          4/5          3/4           4-/5             3/4                                                Functional Outcome Measure:   Quick dash 2/14/22 4/25/22 NT         5/11/22 NT  86% disability    Assessment:   Summary:  Pt has been seen x 13 visits over the past 3 months. She has gained ROM and strength in the past month   Patient's response to treatment: fair with pain being very limiting.  She has family support to asst with her daily shoulder exercises for ROM and strength     Goals:  Patient stated goal: reach/ ADLs     Therapist goals for Patient:   Short Term Goals: To be achieved in: 2 weeks   1. Independent in HEP and progression per patient tolerance, in order to prevent re-injury. []? Progressing: [x]? Met: []? Not Met: []? Adjusted      2. Patient will have a decrease in pain to facilitate improvement in movement, function, and ADLs as indicated by Functional Deficits. []? Progressing: [x]? Met: []? Not Met: []? Adjusted      Long Term Goals: To be achieved in: 6 weeks  1. Disability index score of 43% or less for the DASH to assist with reaching prior level of function. []? Progressing: []? Met: []? Not Met: []? Adjusted      2. Patient will demonstrate increased AROM to 140 to allow for proper joint functioning as indicated by patients Functional Deficits. []? Progressing: []? Met: [x]? Not Met: []? Adjusted      3. Patient will demonstrate an increase in Strength to 4+/5 to allow for proper functional mobility as indicated by patients Functional Deficits. []? Progressing: []? Met: [x]? Not Met: []? Adjusted      4. Patient will return to [facility] for  functional activities without increased symptoms or restriction. []? Progressing: [x]? Met: []? Not Met: []? Adjusted      5.  ADLs/ reach with min limitations (patient specific functional goal)    []? Progressing: [x]? Met: []? Not Met: []? Adjusted        · Progress toward previous goals:   STG met, LTG partially met.      Plan:  D>c to HEp with family asst.  ·     Electronically Signed by: Carolyn Lizama PT [id number] [place] [address] Drive

## 2022-06-28 ENCOUNTER — OFFICE VISIT (OUTPATIENT)
Dept: FAMILY MEDICINE CLINIC | Age: 82
End: 2022-06-28
Payer: MEDICARE

## 2022-06-28 VITALS
OXYGEN SATURATION: 98 % | WEIGHT: 139 LBS | SYSTOLIC BLOOD PRESSURE: 134 MMHG | BODY MASS INDEX: 23.86 KG/M2 | DIASTOLIC BLOOD PRESSURE: 71 MMHG | HEART RATE: 58 BPM

## 2022-06-28 DIAGNOSIS — F03.90 DEMENTIA WITHOUT BEHAVIORAL DISTURBANCE, UNSPECIFIED DEMENTIA TYPE: ICD-10-CM

## 2022-06-28 DIAGNOSIS — F32.9 REACTIVE DEPRESSION: ICD-10-CM

## 2022-06-28 DIAGNOSIS — Z00.00 MEDICARE ANNUAL WELLNESS VISIT, SUBSEQUENT: Primary | ICD-10-CM

## 2022-06-28 DIAGNOSIS — F41.8 SITUATIONAL ANXIETY: ICD-10-CM

## 2022-06-28 DIAGNOSIS — E78.2 MIXED HYPERLIPIDEMIA: ICD-10-CM

## 2022-06-28 DIAGNOSIS — M79.89 RIGHT LEG SWELLING: ICD-10-CM

## 2022-06-28 DIAGNOSIS — R25.2 LEG CRAMPS: ICD-10-CM

## 2022-06-28 DIAGNOSIS — L40.9 PSORIASIS: ICD-10-CM

## 2022-06-28 DIAGNOSIS — N32.81 OVERACTIVE BLADDER: ICD-10-CM

## 2022-06-28 LAB
ANION GAP SERPL CALCULATED.3IONS-SCNC: 11 MMOL/L (ref 3–16)
BASOPHILS ABSOLUTE: 0.1 K/UL (ref 0–0.2)
BASOPHILS RELATIVE PERCENT: 1.3 %
BUN BLDV-MCNC: 18 MG/DL (ref 7–20)
CALCIUM SERPL-MCNC: 9.3 MG/DL (ref 8.3–10.6)
CHLORIDE BLD-SCNC: 106 MMOL/L (ref 99–110)
CHOLESTEROL, TOTAL: 185 MG/DL (ref 0–199)
CO2: 24 MMOL/L (ref 21–32)
CREAT SERPL-MCNC: 0.9 MG/DL (ref 0.6–1.2)
EOSINOPHILS ABSOLUTE: 0 K/UL (ref 0–0.6)
EOSINOPHILS RELATIVE PERCENT: 0.6 %
GFR AFRICAN AMERICAN: >60
GFR NON-AFRICAN AMERICAN: 60
GLUCOSE BLD-MCNC: 69 MG/DL (ref 70–99)
HCT VFR BLD CALC: 38 % (ref 36–48)
HDLC SERPL-MCNC: 76 MG/DL (ref 40–60)
HEMOGLOBIN: 12.8 G/DL (ref 12–16)
LDL CHOLESTEROL CALCULATED: 90 MG/DL
LYMPHOCYTES ABSOLUTE: 0.8 K/UL (ref 1–5.1)
LYMPHOCYTES RELATIVE PERCENT: 17 %
MAGNESIUM: 2.4 MG/DL (ref 1.8–2.4)
MCH RBC QN AUTO: 29.5 PG (ref 26–34)
MCHC RBC AUTO-ENTMCNC: 33.6 G/DL (ref 31–36)
MCV RBC AUTO: 87.9 FL (ref 80–100)
MONOCYTES ABSOLUTE: 0.4 K/UL (ref 0–1.3)
MONOCYTES RELATIVE PERCENT: 9 %
NEUTROPHILS ABSOLUTE: 3.6 K/UL (ref 1.7–7.7)
NEUTROPHILS RELATIVE PERCENT: 72.1 %
PDW BLD-RTO: 14.5 % (ref 12.4–15.4)
PLATELET # BLD: 185 K/UL (ref 135–450)
PMV BLD AUTO: 9.2 FL (ref 5–10.5)
POTASSIUM SERPL-SCNC: 4.6 MMOL/L (ref 3.5–5.1)
RBC # BLD: 4.33 M/UL (ref 4–5.2)
SODIUM BLD-SCNC: 141 MMOL/L (ref 136–145)
T4 FREE: 1.2 NG/DL (ref 0.9–1.8)
TRIGL SERPL-MCNC: 94 MG/DL (ref 0–150)
TSH REFLEX: 6.48 UIU/ML (ref 0.27–4.2)
VITAMIN D 25-HYDROXY: 35.4 NG/ML
VLDLC SERPL CALC-MCNC: 19 MG/DL
WBC # BLD: 5 K/UL (ref 4–11)

## 2022-06-28 PROCEDURE — 1123F ACP DISCUSS/DSCN MKR DOCD: CPT | Performed by: FAMILY MEDICINE

## 2022-06-28 PROCEDURE — G0439 PPPS, SUBSEQ VISIT: HCPCS | Performed by: FAMILY MEDICINE

## 2022-06-28 PROCEDURE — G8420 CALC BMI NORM PARAMETERS: HCPCS | Performed by: FAMILY MEDICINE

## 2022-06-28 PROCEDURE — G8399 PT W/DXA RESULTS DOCUMENT: HCPCS | Performed by: FAMILY MEDICINE

## 2022-06-28 PROCEDURE — G8427 DOCREV CUR MEDS BY ELIG CLIN: HCPCS | Performed by: FAMILY MEDICINE

## 2022-06-28 PROCEDURE — 36415 COLL VENOUS BLD VENIPUNCTURE: CPT | Performed by: FAMILY MEDICINE

## 2022-06-28 PROCEDURE — 1090F PRES/ABSN URINE INCON ASSESS: CPT | Performed by: FAMILY MEDICINE

## 2022-06-28 PROCEDURE — 1036F TOBACCO NON-USER: CPT | Performed by: FAMILY MEDICINE

## 2022-06-28 PROCEDURE — 99214 OFFICE O/P EST MOD 30 MIN: CPT | Performed by: FAMILY MEDICINE

## 2022-06-28 ASSESSMENT — PATIENT HEALTH QUESTIONNAIRE - PHQ9
3. TROUBLE FALLING OR STAYING ASLEEP: 0
SUM OF ALL RESPONSES TO PHQ QUESTIONS 1-9: 1
1. LITTLE INTEREST OR PLEASURE IN DOING THINGS: 0
SUM OF ALL RESPONSES TO PHQ QUESTIONS 1-9: 1
9. THOUGHTS THAT YOU WOULD BE BETTER OFF DEAD, OR OF HURTING YOURSELF: 0
5. POOR APPETITE OR OVEREATING: 0
SUM OF ALL RESPONSES TO PHQ QUESTIONS 1-9: 1
2. FEELING DOWN, DEPRESSED OR HOPELESS: 1
6. FEELING BAD ABOUT YOURSELF - OR THAT YOU ARE A FAILURE OR HAVE LET YOURSELF OR YOUR FAMILY DOWN: 0
SUM OF ALL RESPONSES TO PHQ QUESTIONS 1-9: 1
8. MOVING OR SPEAKING SO SLOWLY THAT OTHER PEOPLE COULD HAVE NOTICED. OR THE OPPOSITE, BEING SO FIGETY OR RESTLESS THAT YOU HAVE BEEN MOVING AROUND A LOT MORE THAN USUAL: 0
7. TROUBLE CONCENTRATING ON THINGS, SUCH AS READING THE NEWSPAPER OR WATCHING TELEVISION: 0
SUM OF ALL RESPONSES TO PHQ9 QUESTIONS 1 & 2: 1
10. IF YOU CHECKED OFF ANY PROBLEMS, HOW DIFFICULT HAVE THESE PROBLEMS MADE IT FOR YOU TO DO YOUR WORK, TAKE CARE OF THINGS AT HOME, OR GET ALONG WITH OTHER PEOPLE: 0
4. FEELING TIRED OR HAVING LITTLE ENERGY: 0

## 2022-06-28 ASSESSMENT — LIFESTYLE VARIABLES
HOW OFTEN DURING THE LAST YEAR HAVE YOU NEEDED AN ALCOHOLIC DRINK FIRST THING IN THE MORNING TO GET YOURSELF GOING AFTER A NIGHT OF HEAVY DRINKING: 0
HOW OFTEN DO YOU HAVE A DRINK CONTAINING ALCOHOL: 4 OR MORE TIMES A WEEK
HOW OFTEN DURING THE LAST YEAR HAVE YOU BEEN UNABLE TO REMEMBER WHAT HAPPENED THE NIGHT BEFORE BECAUSE YOU HAD BEEN DRINKING: 0
HAS A RELATIVE, FRIEND, DOCTOR, OR ANOTHER HEALTH PROFESSIONAL EXPRESSED CONCERN ABOUT YOUR DRINKING OR SUGGESTED YOU CUT DOWN: 0
HOW OFTEN DURING THE LAST YEAR HAVE YOU HAD A FEELING OF GUILT OR REMORSE AFTER DRINKING: 0
HOW OFTEN DURING THE LAST YEAR HAVE YOU FOUND THAT YOU WERE NOT ABLE TO STOP DRINKING ONCE YOU HAD STARTED: 0
HOW OFTEN DURING THE LAST YEAR HAVE YOU FAILED TO DO WHAT WAS NORMALLY EXPECTED FROM YOU BECAUSE OF DRINKING: 0
HAVE YOU OR SOMEONE ELSE BEEN INJURED AS A RESULT OF YOUR DRINKING: 0
HOW MANY STANDARD DRINKS CONTAINING ALCOHOL DO YOU HAVE ON A TYPICAL DAY: 1 OR 2

## 2022-06-28 NOTE — PROGRESS NOTES
Chief Complaint   Patient presents with    Swelling     right leg    Memory Loss    Hyperlipidemia        Internal Administration   First Dose COVID-19, Boris Reagan, Primary or Immunocompromised, PF, 100mcg/0.5mL  2021   Second Dose COVID-19, Moderna, Primary or Immunocompromised, PF, 100mcg/0.5mL   2021       Last COVID Lab No results found for: SARS-COV-2, SARS-COV-2 RNA, SARS-COV-2, SARS-COV-2, SARS-COV-2 BY PCR, SARS-COV-2, SARS-COV-2, SARS-COV-2          Wt Readings from Last 3 Encounters:   22 139 lb (63 kg)   22 136 lb (61.7 kg)   22 136 lb (61.7 kg)     BP Readings from Last 3 Encounters:   22 134/71   21 (!) 146/54   10/04/21 138/86      No results found for: LABA1C    HPI:  Dayami Alex is a 80 y.o. (: 1940) here today for    Patient states that her right leg continues to swell. She states that her right ankle doesn't bother her but it bothers every one else. She states she does have some burning and stinging in her ankle. Discussed this could be due to varicose veins. Discussed it does not appear to be a blood clot, no redness and no appearance of DVT. She states that she stopped taking the Aricept, her daughter felt her memory was worse on the medication. Memory is about the same. Leg cramps have improved. [] Patient has completed an advance directive  [x] Patient has NOT completed an advanced directive  [] Patient has a documented healthcare surrogate  [x] Patient does NOT have a documented healthcare surrogate  [] Discussed the importance of establishing and updating an advanced directive. Patient has questions at this time and those were answered. [x] Discussed the importance of establishing and updating an advanced directive. Patient does NOT have questions at this time.     Discussed with: [x] Patient            [] Family             [] Other caregiver    Patient's medications, allergies, past medical, surgical, social and family histories were reviewed and updated asappropriate on 2022 at 9:38 AM.    ROS:  Review of Systems    All other systems reviewed and are negative except as noted above on 2022 at 9:38 AM. Additional review of systems may be scanned into the media section ofthis medical record. Any responses requiring further intervention were pursued. Past Medical History:   Diagnosis Date    Closed nondisplaced fracture of distal phalanx of lesser toe of left foot 2017    DDD (degenerative disc disease)     Grave's disease     Hyperlipidemia     Lichen planus     Overactive bladder     Psoriasis     Radiculopathy      Family History   Problem Relation Age of Onset    Heart Disease Mother     Heart Disease Father      Social History     Socioeconomic History    Marital status:      Spouse name: Not on file    Number of children: Not on file    Years of education: Not on file    Highest education level: Not on file   Occupational History    Not on file   Tobacco Use    Smoking status: Former Smoker     Packs/day: 0.15     Years: 5.00     Pack years: 0.75     Types: Cigarettes     Quit date: 1998     Years since quittin.9    Smokeless tobacco: Never Used   Vaping Use    Vaping Use: Never used   Substance and Sexual Activity    Alcohol use: Yes    Drug use: No    Sexual activity: Yes     Partners: Male   Other Topics Concern    Not on file   Social History Narrative    Not on file     Social Determinants of Health     Financial Resource Strain: Low Risk     Difficulty of Paying Living Expenses: Not hard at all   Food Insecurity: No Food Insecurity    Worried About Running Out of Food in the Last Year: Never true    [address] in the Last Year: Never true   Transportation Needs:     Lack of Transportation (Medical): Not on file    Lack of Transportation (Non-Medical):  Not on file   Physical Activity:     Days of Exercise per Week: Not on file    Minutes of Exercise per Session: Not on file   Stress:     Feeling of Stress : Not on file   Social Connections:     Frequency of Communication with Friends and Family: Not on file    Frequency of Social Gatherings with Friends and Family: Not on file    Attends Confucianism Services: Not on file    Active Member of Clubs or Organizations: Not on file    Attends Club or Organization Meetings: Not on file    Marital Status: Not on file   Intimate Partner Violence:     Fear of Current or Ex-Partner: Not on file    Emotionally Abused: Not on file    Physically Abused: Not on file    Sexually Abused: Not on file   Housing Stability:     Unable to Pay for Housing in the Last Year: Not on file    Number of [place] in the Last Year: Not on file    Unstable Housing in the Last Year: Not on file     Prior to Visit Medications    Medication Sig Taking? Authorizing Provider   clobetasol (TEMOVATE) 0.05 % ointment Apply to affected areas on frontal scalp and ears twice daily for 1-2 weeks, then as needed with flares Yes Terrance Brito MD   fluocinonide (LIDEX) 0.05 % external solution Apply to scalp twice daily for two weeks, then taper to as needed with flares. Yes Terrance Brito MD   ketoconazole (NIZORAL) 2 % shampoo Apply shampoo three times weekly (M, W, F) leave in 3-5 min, then rinse.  May alternate with other shampoos Yes Terrance Brito MD   Apremilast (OTEZLA) 30 MG TABS Take 30 mg by mouth 2 times daily Yes Terrance Brito MD   levothyroxine (SYNTHROID) 50 MCG tablet TAKE 1 TABLET BY MOUTH EVERY DAY Yes Manuel Agrawal MD   escitalopram (LEXAPRO) 10 MG tablet Take 1 tablet by mouth daily Yes Manuel Agrawal MD   atorvastatin (LIPITOR) 10 MG tablet TAKE 1 TABLET DAILY Yes Manuel Agrawal MD   loratadine (CLARITIN) 10 MG tablet Take 1 tablet by mouth daily Yes Manuel Agrawal MD   fluticasone (FLONASE) 50 MCG/ACT nasal spray SPRAY 1 SPRAY INTO EACH NOSTRIL EVERY DAY Yes Tej Obrien Barbi Pham MD   [facility] Natural Products (OSTEO BI-FLEX JOINT SHIELD PO) Take by mouth + VITAMIN D Yes Historical Provider, MD   ibandronate (BONIVA) 150 MG tablet Take 150 mg by mouth every 30 days Take 1 tablet every 30 days. Yes Historical Provider, MD   tolterodine (DETROL LA) 4 MG ER capsule Take 4 mg by mouth daily. Yes Historical Provider, MD   timolol (BETIMOL) 0.5 % ophthalmic solution 1 drop 2 times daily. Yes Historical Provider, MD     Allergies   Allergen Reactions    Bacitracin        OBJECTIVE:  Estimated body mass index is 23.86 kg/m² as calculated from the following:    Height as of 2/11/22: 5' 4\" (1.626 m). Weight as of this encounter: 139 lb (63 kg). Vitals:    06/28/22 0927   BP: 134/71   Pulse: 58   SpO2: 98%   Weight: 139 lb (63 kg)       Physical Exam  Vitals and nursing note reviewed. Constitutional:       General: She is not in acute distress. Appearance: She is well-developed. She is not diaphoretic. HENT:      Head: Normocephalic and atraumatic. Right Ear: External ear normal.      Left Ear: External ear normal.      Nose: Nose normal.   Eyes:      General: Lids are normal. No scleral icterus. Right eye: No discharge. Left eye: No discharge. Pupils: Pupils are equal, round, and reactive to light. Neck:      Thyroid: No thyromegaly. Vascular: No JVD. Cardiovascular:      Rate and Rhythm: Normal rate and regular rhythm. Heart sounds: Normal heart sounds. Pulmonary:      Effort: Pulmonary effort is normal. No respiratory distress. Breath sounds: Normal breath sounds. Abdominal:      Palpations: Abdomen is soft. There is no hepatomegaly or splenomegaly. Tenderness: There is no abdominal tenderness. Musculoskeletal:         General: No tenderness (right ankle). Right lower leg: Edema (2+) present. Left lower leg: No edema. Skin:     General: Skin is warm and dry. Coloration: Skin is not pale.       Findings: No erythema or rash. Comments: Turgor normal   Neurological:      Mental Status: She is oriented to person, place, and time. Psychiatric:         Mood and Affect: Mood normal.         Behavior: Behavior normal.         Thought Content: Thought content normal.         Judgment: Judgment normal.              ASSESSMENT PLAN      Diagnosis Orders   1. Right leg swelling     2. Dementia without behavioral disturbance, unspecified dementia type ([facility] [address]     3. Situational anxiety     4. Reactive depression     5. Mixed hyperlipidemia  LIPID PANEL   6. Leg cramps  Magnesium    TSH with Reflex    Vitamin D 25 Hydroxy   7. Overactive bladder  CBC with Auto Differential    Basic Metabolic Panel   8. Psoriasis     In addition to family and if likely, patient has an ankle that is mildly swollen, this is likely a combination of venous insufficiency and arthritis and question past trauma. Patient does not remember anything in particular regarding trauma. Regardless I reassured her I did not think she had a blood clot or infection in her leg or heart failure or kidney failure therefore no treatment necessary could be observed. She will pass this on to her family for reassurance. Stopped Aricept as family thought that she was more confused on the higher dose. Will not start Namenda and will not restart Aricept. Nerves are about the same. Leg cramps are better. Bladder insufficiency the same. Psoriasis treated elsewhere. Lipids will be monitored based upon levels requiring treatment and other cardiac risks. Medications for hyperlipidemia and hypertriglyceridemia as listed on the medication list will be changed as necessary to reach control parameters. Follow-up 6 months    Patient should call the office immediately with new or ongoing signs or symptoms or worsening, or proceed to the emergency room.   No changes in past medical history, past surgical history, social history, or family history were noted during the patient encounter unless specifically listed above. All updates of past medical history, past surgical history, social history, or family history were reviewed personally by me during the office visit. All problems listed in the assessment are stable unless noted otherwise. Medication profile reviewed personally by me during the visit. Medication side effects and possible impairments from medications were discussed as applicable. This document was prepared by a combination of typing and transcription through a voice recognition software. Scribe attestation: Yolanda Loredo RN, am scribing for and in the presence of Carrol Fletcher MD. Electronically signed by Rosy Lancaster RN on 6/28/2022 at 9:38 AM      Provider attestation:     I, Dr. Giles De Santiago, personally performed the services described in this documentation, as scribed by the above signed scribe in my presence, and it is both accurate and complete. I agree with the ROS and Past Histories independently gathered by the clinical support staff and the remaining scribed note accurately describes my personal service to the patient.       6/28/2022    10:28 AM

## 2022-06-28 NOTE — PATIENT INSTRUCTIONS

## 2022-06-28 NOTE — PROGRESS NOTES
Medicare Annual Wellness Visit  Name: David Whyte Date: 2022   MRN: [id number] Sex: Female   Age: 80 y.o. Ethnicity: Non- / Non    : 1940 Race: White (non-)      Babs Montague is here for Medicare AWV, Memory Loss, Hyperlipidemia, and Swelling (right leg)    Screenings for behavioral, psychosocial and functional/safety risks, and cognitive dysfunction are all negative except as indicated below. These results, as well as other patient data from the [address] form, are documented in Flowsheets linked to this Encounter. Allergies   Allergen Reactions    Bacitracin        Prior to Visit Medications    Medication Sig Taking? Authorizing Provider   clobetasol (TEMOVATE) 0.05 % ointment Apply to affected areas on frontal scalp and ears twice daily for 1-2 weeks, then as needed with flares Yes Sally Kasper MD   fluocinonide (LIDEX) 0.05 % external solution Apply to scalp twice daily for two weeks, then taper to as needed with flares. Yes Sally Kasper MD   ketoconazole (NIZORAL) 2 % shampoo Apply shampoo three times weekly (M, W, F) leave in 3-5 min, then rinse. May alternate with other shampoos Yes Sally Kasper MD   Apremilast (OTEZLA) 30 MG TABS Take 30 mg by mouth 2 times daily Yes Sally Kasper MD   levothyroxine (SYNTHROID) 50 MCG tablet TAKE 1 TABLET BY MOUTH EVERY DAY Yes Jena Arnold MD   escitalopram (LEXAPRO) 10 MG tablet Take 1 tablet by mouth daily Yes Jena Arnold MD   atorvastatin (LIPITOR) 10 MG tablet TAKE 1 TABLET DAILY Yes Jena Arnold MD   fluticasone (FLONASE) 50 MCG/ACT nasal spray SPRAY 1 SPRAY INTO EACH NOSTRIL EVERY DAY Yes Jena Arnold MD   Misc Natural Products (OSTEO BI-FLEX JOINT SHIELD PO) Take by mouth + VITAMIN D Yes Historical Provider, MD   ibandronate (BONIVA) 150 MG tablet Take 150 mg by mouth every 30 days Take 1 tablet every 30 days.  Yes Historical Provider, MD   tolterodine (DETROL LA) 4 MG ER capsule Take 4 mg by mouth daily. Yes Historical Provider, MD   timolol (BETIMOL) 0.5 % ophthalmic solution 1 drop 2 times daily. Yes Historical Provider, MD       Past Medical History:   Diagnosis Date    Closed nondisplaced fracture of distal phalanx of lesser toe of left foot 6/14/2017    DDD (degenerative disc disease)     Grave's disease     Hyperlipidemia     Lichen planus     Overactive bladder     Psoriasis     Radiculopathy        Past Surgical History:   Procedure Laterality Date    CYST REMOVAL      from right forearm 9/2016    EYE SURGERY      HYSTERECTOMY (CERVIX STATUS UNKNOWN)         Family History   Problem Relation Age of Onset    Heart Disease Mother     Heart Disease Father        CareTeam (Including outside providers/suppliers regularly involved in providing care):   Patient Care Team:  Adilson Sharp MD as PCP - General (Family Medicine)  Adilson Sharp MD as PCP - [facility] Empaneled Provider    Wt Readings from Last 3 Encounters:   06/28/22 139 lb (63 kg)   02/11/22 136 lb (61.7 kg)   01/14/22 136 lb (61.7 kg)     Vitals:    06/28/22 0927   BP: 134/71   Pulse: 58   SpO2: 98%   Weight: 139 lb (63 kg)     Body mass index is 23.86 kg/m². Based upon direct observation of the patient, evaluation of cognition reveals recent and remote memory intact. Patient's complete Health Risk Assessment and screening values have been reviewed and are found in Flowsheets. The following problems were reviewed today and where indicated follow up appointments were made and/or referrals ordered.     Positive Risk Factor Screenings with Interventions:     Fall Risk:  Do you feel unsteady or are you worried about falling? : (!) yes  2 or more falls in past year?: no  Fall with injury in past year?: (!) yes     Fall Risk Interventions:    · Patient declines any further evaluation/treatment for this issue        General Health and ACP:  General  In general, how would you say your health is?: Good  In the past 7 days, have you experienced any of the following: New or Increased Pain, New or Increased Fatigue, Loneliness, Social Isolation, Stress or Anger?: No  Do you get the social and emotional support that you need?: Yes  Do you have a Living Will?: Yes    Advance Directives     Power of  Living Will ACP-Advance Directive ACP-Power of     Not on File Not on File Not on File Not on File      General Health Risk Interventions:  · No Living Will: ACP documents already completed- patient asked to provide copy to the office    Health Habits/Nutrition:     Physical Activity: Inactive    Days of Exercise per Week: 0 days    Minutes of Exercise per Session: 0 min     Have you lost any weight without trying in the past 3 months?: (!) Yes (states from stress)     Have you seen the dentist within the past year?: Yes    Health Habits/Nutrition Interventions:  · No interventions needed at this time.      Safety:  Do you have working smoke detectors?: Yes  Do you have any tripping hazards - loose or unsecured carpets or rugs?: No  Do you have any tripping hazards - clutter in doorways, halls, or stairs?: No  Do you have either shower bars, grab bars, non-slip mats or non-slip surfaces in your shower or bathtub?: Yes  Do all of your stairways have a railing or banister?: (!) No  Do you always fasten your seatbelt when you are in a car?: Yes    Safety Interventions:  · Patient declines any further evaluation/treatment for this issue     Personalized Preventive Plan   Current Health Maintenance Status  Immunization History   Administered Date(s) Administered    COVID-19, Alina Romero, Primary or Immunocompromised, PF, 100mcg/0.5mL 01/21/2021, 02/25/2021    Hepatitis A/Hepatitis B (Twinrix) 06/24/2014    Influenza Vaccine, unspecified formulation 10/13/2016    Influenza Virus Vaccine 11/03/2015, 10/11/2017, 08/20/2019    Influenza, High Dose (Fluzone 65 yrs and older) 09/06/2018    Influenza, MDCK Quadv, IM, PF (Flucelvax 2 yrs and older) 10/04/2021    Influenza, Quadv, IM, PF (6 mo and older Fluzone, Flulaval, Fluarix, and 3 yrs and older Afluria) 09/08/2020    Influenza, Triv, inactivated, subunit, adjuvanted, IM (Fluad 65 yrs and older) 08/20/2019    Pneumococcal Conjugate 13-valent (Bdtzhlz19) 04/14/2015    Pneumococcal Polysaccharide ([id number]) 05/08/2017    Tdap (Boostrix, Adacel) 05/09/2017    Zoster Recombinant (Shingrix) 08/01/2018, 10/16/2018        Health Maintenance   Topic Date Due    Depression Monitoring  12/08/2021    Annual Wellness Visit (AWV)  12/09/2021    Lipids  04/19/2022    DTaP/Tdap/Td vaccine (3 - Td or Tdap) 01/03/2032    DEXA (modify frequency per FRAX score)  Completed    Flu vaccine  Completed    Shingles vaccine  Completed    Pneumococcal 65+ years Vaccine  Completed    COVID-19 Vaccine  Completed    Hepatitis A vaccine  Aged Out    Hepatitis B vaccine  Aged Out    Hib vaccine  Aged Out    Meningococcal (ACWY) vaccine  Aged Out     Recommendations for Meritful Due: see orders and patient instructions/AVS.  . Recommended screening schedule for the next 5-10 years is provided to the patient in written form: see Patient Instructions/AVS.    Marysol Dela Cruz LPN, [id number], performed the documented evaluation under the direct supervision of the attending physician. This encounter was performed under my, David Sanches, direct supervision, 6/28/2022.

## 2022-06-29 DIAGNOSIS — R79.89 ABNORMAL THYROID BLOOD TEST: Primary | ICD-10-CM

## 2022-06-29 RX ORDER — LEVOTHYROXINE SODIUM 0.07 MG/1
75 TABLET ORAL DAILY
Qty: 90 TABLET | Refills: 1 | Status: SHIPPED | OUTPATIENT
Start: 2022-06-29

## 2022-07-11 ENCOUNTER — TELEPHONE (OUTPATIENT)
Dept: FAMILY MEDICINE CLINIC | Age: 82
End: 2022-07-11

## 2022-09-14 ENCOUNTER — NURSE ONLY (OUTPATIENT)
Dept: FAMILY MEDICINE CLINIC | Age: 82
End: 2022-09-14
Payer: MEDICARE

## 2022-09-14 DIAGNOSIS — R79.89 ABNORMAL THYROID BLOOD TEST: ICD-10-CM

## 2022-09-14 PROCEDURE — 36415 COLL VENOUS BLD VENIPUNCTURE: CPT | Performed by: FAMILY MEDICINE

## 2022-09-15 LAB
T4 FREE: 1.6 NG/DL (ref 0.9–1.8)
TSH REFLEX: 1.52 UIU/ML (ref 0.27–4.2)

## 2022-11-08 ENCOUNTER — TELEMEDICINE (OUTPATIENT)
Dept: FAMILY MEDICINE CLINIC | Age: 82
End: 2022-11-08
Payer: MEDICARE

## 2022-11-08 ENCOUNTER — HOSPITAL ENCOUNTER (OUTPATIENT)
Age: 82
Discharge: HOME | End: 2022-11-08
Payer: MEDICARE

## 2022-11-08 DIAGNOSIS — R68.89 FLU-LIKE SYMPTOMS: Primary | ICD-10-CM

## 2022-11-08 DIAGNOSIS — R68.89 FLU-LIKE SYMPTOMS: ICD-10-CM

## 2022-11-08 LAB
RAPID INFLUENZA  B AGN: NEGATIVE
RAPID INFLUENZA A AGN: NEGATIVE

## 2022-11-08 PROCEDURE — U0005 INFEC AGEN DETEC AMPLI PROBE: HCPCS

## 2022-11-08 PROCEDURE — U0003 INFECTIOUS AGENT DETECTION BY NUCLEIC ACID (DNA OR RNA); SEVERE ACUTE RESPIRATORY SYNDROME CORONAVIRUS 2 (SARS-COV-2) (CORONAVIRUS DISEASE [COVID-19]), AMPLIFIED PROBE TECHNIQUE, MAKING USE OF HIGH THROUGHPUT TECHNOLOGIES AS DESCRIBED BY CMS-2020-01-R: HCPCS

## 2022-11-08 PROCEDURE — 87804 INFLUENZA ASSAY W/OPTIC: CPT

## 2022-11-08 PROCEDURE — 99441 PR PHYS/QHP TELEPHONE EVALUATION 5-10 MIN: CPT | Performed by: FAMILY MEDICINE

## 2022-11-08 SDOH — ECONOMIC STABILITY: FOOD INSECURITY: WITHIN THE PAST 12 MONTHS, THE FOOD YOU BOUGHT JUST DIDN'T LAST AND YOU DIDN'T HAVE MONEY TO GET MORE.: NEVER TRUE

## 2022-11-08 SDOH — ECONOMIC STABILITY: FOOD INSECURITY: WITHIN THE PAST 12 MONTHS, YOU WORRIED THAT YOUR FOOD WOULD RUN OUT BEFORE YOU GOT MONEY TO BUY MORE.: NEVER TRUE

## 2022-11-08 ASSESSMENT — SOCIAL DETERMINANTS OF HEALTH (SDOH): HOW HARD IS IT FOR YOU TO PAY FOR THE VERY BASICS LIKE FOOD, HOUSING, MEDICAL CARE, AND HEATING?: NOT HARD AT ALL

## 2022-11-08 NOTE — PROGRESS NOTES
Jenny Smith is a 80 y.o. female evaluated via telephone on 11/8/2022. Headache and cough X 2-3 days. States  has been sick and on antibiotics so she believes she is starting to get it. She has taken Tylenol for the headache but no other OTC medication. Internal Administration   First Dose COVID-19, MODERNA BLUE border, Primary or Immunocompromised, (age 12y+), IM, 100 mcg/0.5mL  01/21/2021   Second Dose COVID-19, HESHAMA BLUE border, Primary or Immunocompromised, (age 12y+), IM, 100 mcg/0.5mL   02/25/2021       Last COVID Lab No results found for: SARS-COV-2, SARS-COV-2 RNA, SARS-COV-2, SARS-COV-2, SARS-COV-2 BY PCR, SARS-COV-2, SARS-COV-2, SARS-COV-2         Wt Readings from Last 3 Encounters:   06/28/22 139 lb (63 kg)   02/11/22 136 lb (61.7 kg)   01/14/22 136 lb (61.7 kg)     BP Readings from Last 3 Encounters:   06/28/22 134/71   12/09/21 (!) 146/54   10/04/21 138/86     No results found for: LABA1C     ASSESSMENT PLAN      Diagnosis Orders   1. Flu-like symptoms  COVID-19    RAPID INFLUENZA A/B ANTIGENS      Patient with cough congestion no fever.  recently ill who tested negative for COVID. Suggested she get a flu test and COVID test since treatment is different. Further recommendations on treatment after results are back. Patient should call the office immediately with new or ongoing signs or symptoms or worsening, or proceed to the emergency room. No changes in past medical history, past surgical history, social history, or family history were noted during the patient encounter unless specifically listed above. All updates of past medical history, past surgical history, social history, or family history were reviewed personally by me during the office visit. All problems listed in the assessment are stable unless noted otherwise. Medication profile reviewed personally by me during the visit.   Medication side effects and possible impairments from medications were discussed as applicable. This document was prepared by a combination of typing and transcription through a voice recognition software. [] Patient has completed an advance directive  [] Patient has NOT completed an advanced directive  [] Patient has a documented healthcare surrogate  [] Patient does NOT have a documented healthcare surrogate  [] Discussed the importance of establishing and updating an advanced directive. Patient has questions at this time and those were answered. [] Discussed the importance of establishing and updating an advanced directive. Patient does NOT have questions at this time. Discussed with: [] Patient            [] Family             [] Other caregiver         Consent:  She and/or health care decision maker is aware that that she may receive a bill for this telephone service, depending on her insurance coverage, and has provided verbal consent to proceed: Yes      Documentation:  I communicated with the patient and/or health care decision maker about see above. Details of this discussion including any medical advice provided: See above      I affirm this is a Patient Initiated Episode with an Established Patient who has not had a related appointment within my department in the past 7 days or scheduled within the next 24 hours.     Total Time: minutes: 5-10 minutes    Note: not billable if this call serves to triage the patient into an appointment for the relevant concern      Rose Marie Pack

## 2022-11-09 DIAGNOSIS — B96.89 ACUTE BACTERIAL RHINOSINUSITIS: Primary | ICD-10-CM

## 2022-11-09 DIAGNOSIS — J01.90 ACUTE BACTERIAL RHINOSINUSITIS: Primary | ICD-10-CM

## 2022-11-09 LAB — SARS-COV-2: NOT DETECTED

## 2022-11-09 RX ORDER — AZITHROMYCIN 250 MG/1
250 TABLET, FILM COATED ORAL SEE ADMIN INSTRUCTIONS
Qty: 6 TABLET | Refills: 0 | Status: SHIPPED | OUTPATIENT
Start: 2022-11-09 | End: 2022-11-14

## 2022-11-11 ENCOUNTER — TELEPHONE (OUTPATIENT)
Dept: FAMILY MEDICINE CLINIC | Age: 82
End: 2022-11-11

## 2022-11-11 NOTE — TELEPHONE ENCOUNTER
Pt states that she had a telemedicine on 11/8/22. And she was given Zithromax it made her sick. She states that she vomited was hot all over and was having cramps. If you want her to still be taking something she will need it called in to [facility] in [facility].

## 2022-11-28 DIAGNOSIS — G31.84 MILD COGNITIVE IMPAIRMENT: ICD-10-CM

## 2022-11-28 DIAGNOSIS — F03.90 DEMENTIA WITHOUT BEHAVIORAL DISTURBANCE (HCC): ICD-10-CM

## 2022-11-28 RX ORDER — DONEPEZIL HYDROCHLORIDE 10 MG/1
TABLET, FILM COATED ORAL
Qty: 90 TABLET | Refills: 3 | OUTPATIENT
Start: 2022-11-28

## 2022-12-19 RX ORDER — LEVOTHYROXINE SODIUM 0.07 MG/1
TABLET ORAL
Qty: 30 TABLET | Refills: 0 | Status: SHIPPED | OUTPATIENT
Start: 2022-12-19 | End: 2022-12-19 | Stop reason: SDUPTHER

## 2022-12-19 RX ORDER — LEVOTHYROXINE SODIUM 0.07 MG/1
TABLET ORAL
Qty: 90 TABLET | Refills: 0 | Status: SHIPPED | OUTPATIENT
Start: 2022-12-19

## 2023-01-09 ENCOUNTER — OFFICE VISIT (OUTPATIENT)
Dept: FAMILY MEDICINE CLINIC | Age: 83
End: 2023-01-09

## 2023-01-09 VITALS
SYSTOLIC BLOOD PRESSURE: 116 MMHG | BODY MASS INDEX: 25.4 KG/M2 | DIASTOLIC BLOOD PRESSURE: 70 MMHG | HEART RATE: 52 BPM | WEIGHT: 148 LBS | OXYGEN SATURATION: 95 %

## 2023-01-09 DIAGNOSIS — R25.2 LEG CRAMPS: ICD-10-CM

## 2023-01-09 DIAGNOSIS — F03.90 DEMENTIA WITHOUT BEHAVIORAL DISTURBANCE (HCC): Primary | ICD-10-CM

## 2023-01-09 DIAGNOSIS — R41.3 MEMORY LOSS: ICD-10-CM

## 2023-01-09 DIAGNOSIS — M51.36 LUMBAR DEGENERATIVE DISC DISEASE: ICD-10-CM

## 2023-01-09 DIAGNOSIS — E78.2 MIXED HYPERLIPIDEMIA: ICD-10-CM

## 2023-01-09 DIAGNOSIS — G31.84 MILD COGNITIVE IMPAIRMENT: ICD-10-CM

## 2023-01-09 RX ORDER — MEMANTINE HYDROCHLORIDE 7-14-21-28
KIT ORAL
Status: CANCELLED | OUTPATIENT
Start: 2023-01-09

## 2023-01-09 RX ORDER — MEMANTINE HYDROCHLORIDE 28 MG/1
28 CAPSULE, EXTENDED RELEASE ORAL DAILY
Qty: 90 CAPSULE | Refills: 3 | Status: CANCELLED | OUTPATIENT
Start: 2023-01-09

## 2023-01-09 RX ORDER — MEMANTINE HYDROCHLORIDE 5 MG/1
TABLET ORAL
Qty: 180 TABLET | Refills: 1 | Status: SHIPPED | OUTPATIENT
Start: 2023-01-09 | End: 2023-07-22

## 2023-01-09 ASSESSMENT — PATIENT HEALTH QUESTIONNAIRE - PHQ9
SUM OF ALL RESPONSES TO PHQ QUESTIONS 1-9: 14
SUM OF ALL RESPONSES TO PHQ QUESTIONS 1-9: 14
3. TROUBLE FALLING OR STAYING ASLEEP: 0
7. TROUBLE CONCENTRATING ON THINGS, SUCH AS READING THE NEWSPAPER OR WATCHING TELEVISION: 3
SUM OF ALL RESPONSES TO PHQ QUESTIONS 1-9: 14
4. FEELING TIRED OR HAVING LITTLE ENERGY: 3
SUM OF ALL RESPONSES TO PHQ9 QUESTIONS 1 & 2: 6
5. POOR APPETITE OR OVEREATING: 0
10. IF YOU CHECKED OFF ANY PROBLEMS, HOW DIFFICULT HAVE THESE PROBLEMS MADE IT FOR YOU TO DO YOUR WORK, TAKE CARE OF THINGS AT HOME, OR GET ALONG WITH OTHER PEOPLE: 1
SUM OF ALL RESPONSES TO PHQ QUESTIONS 1-9: 14
9. THOUGHTS THAT YOU WOULD BE BETTER OFF DEAD, OR OF HURTING YOURSELF: 0
8. MOVING OR SPEAKING SO SLOWLY THAT OTHER PEOPLE COULD HAVE NOTICED. OR THE OPPOSITE, BEING SO FIGETY OR RESTLESS THAT YOU HAVE BEEN MOVING AROUND A LOT MORE THAN USUAL: 1
6. FEELING BAD ABOUT YOURSELF - OR THAT YOU ARE A FAILURE OR HAVE LET YOURSELF OR YOUR FAMILY DOWN: 1
1. LITTLE INTEREST OR PLEASURE IN DOING THINGS: 3
2. FEELING DOWN, DEPRESSED OR HOPELESS: 3

## 2023-01-09 NOTE — PROGRESS NOTES
Chief Complaint   Patient presents with    Hyperlipidemia    Memory Loss        Internal Administration   First Dose COVID-19, MODERNA BLUE border, Primary or Immunocompromised, (age 12y+), IM, 100 mcg/0.5mL  2021   Second Dose COVID-19, MODERNA LAURIE border, Primary or Immunocompromised, (age 12y+), IM, 100 mcg/0.5mL   2021       Last COVID Lab SARS-CoV-2 (no units)   Date Value   2022 Not Detected             Wt Readings from Last 3 Encounters:   22 139 lb (63 kg)   22 136 lb (61.7 kg)   22 136 lb (61.7 kg)     BP Readings from Last 3 Encounters:   22 134/71   21 (!) 146/54   10/04/21 138/86      No results found for: LABA1C    HPI:  Mehdi Smith is a 80 y.o. (: 1940) here today for    She states that she still gets leg cramps every now and then but not like she was previously where they were daily. Blood pressures are running excellent. Her memory is about the same and is still not taking anything for it. She did not tolerate the donepazil, asking about another type she could take for her memory. Will trial namenda. [] Patient has completed an advance directive  [x] Patient has NOT completed an advanced directive  [] Patient has a documented healthcare surrogate  [x] Patient does NOT have a documented healthcare surrogate  [] Discussed the importance of establishing and updating an advanced directive. Patient has questions at this time and those were answered. [x] Discussed the importance of establishing and updating an advanced directive. Patient does NOT have questions at this time.     Discussed with: [x] Patient            [] Family             [] Other caregiver    Patient's medications, allergies, past medical, surgical, social and family histories were reviewed and updated asappropriate on 2023 at 10:59 AM.    ROS:  Review of Systems    All other systems reviewed and are negative except as noted above on 2023 at 10:59 AM. Additional review of systems [facility] Any responses requiring further intervention were pursued. Past Medical History:   Diagnosis Date    Closed nondisplaced fracture of distal phalanx of lesser toe of left foot 2017    DDD (degenerative disc disease)     Grave's disease     Hyperlipidemia     Lichen planus     Overactive bladder     Psoriasis     Radiculopathy      Family History   Problem Relation Age of Onset    Heart Disease Mother     Heart Disease Father      Social History     Socioeconomic History    Marital status:      Spouse name: Not on file    Number of children: Not on file    Years of education: Not on file    Highest education level: Not on file   Occupational History    Not on file   Tobacco Use    Smoking status: Former     Packs/day: 0.15     Years: 5.00     Pack years: 0.75     Types: Cigarettes     Quit date: 1998     Years since quittin.5    Smokeless tobacco: Never   Vaping Use    Vaping Use: Never used   Substance and Sexual Activity    Alcohol use: Yes    Drug use: No    Sexual activity: Yes     Partners: Male   Other Topics Concern    Not on file   Social History Narrative    Not on file     Social Determinants of Health     Financial Resource Strain: Low Risk     Difficulty of Paying Living Expenses: Not hard at all   Food Insecurity: No Food Insecurity    Worried About Running Out of Food in the Last Year: Never true    Ran Out of Food in the Last Year: Never true   Transportation Needs: Not on file   Physical Activity: Inactive    Days of Exercise per Week: 0 days    Minutes of Exercise per Session: 0 min   Stress: Not on file   Social Connections: Not on file   Intimate Partner Violence: Not on file   Housing Stability: Not on file     Prior to Visit Medications    Medication Sig Taking?  Authorizing Provider   levothyroxine (SYNTHROID) 75 MCG tablet TAKE 1 TABLET BY MOUTH EVERY DAY Yes Jessie Chao APRN - CNP   clobetasol (TEMOVATE) 0.05 % ointment Apply to affected areas on frontal scalp and ears twice daily for 1-2 weeks, then as needed with flares Yes Truong Prado MD   fluocinonide (LIDEX) 0.05 % external solution Apply to scalp twice daily for two weeks, then taper to as needed with flares. Yes Truong Prado MD   ketoconazole (NIZORAL) 2 % shampoo Apply shampoo three times weekly (M, W, F) leave in 3-5 min, then rinse. May alternate with other shampoos Yes Truong Prado MD   Apremilast (OTEZLA) 30 MG TABS Take 30 mg by mouth 2 times daily Yes Truong Prado MD   escitalopram (LEXAPRO) 10 MG tablet Take 1 tablet by mouth daily Yes Sunita Martínez MD   atorvastatin (LIPITOR) 10 MG tablet TAKE 1 TABLET DAILY Yes Sunita Martínez MD   fluticasone (FLONASE) 50 MCG/ACT nasal spray SPRAY 1 SPRAY INTO EACH NOSTRIL EVERY DAY Yes Sunita Martínez MD   Misc Natural Products (OSTEO BI-FLEX JOINT SHIELD PO) Take by mouth + VITAMIN D Yes Historical Provider, MD   ibandronate (BONIVA) 150 MG tablet Take 150 mg by mouth every 30 days Take 1 tablet every 30 days. Yes Historical Provider, MD   tolterodine (DETROL LA) 4 MG extended release capsule Take 4 mg by mouth daily. Yes Historical Provider, MD   timolol (BETIMOL) 0.5 % ophthalmic solution 1 drop 2 times daily. Yes Historical Provider, MD     Allergies   Allergen Reactions    Bacitracin        OBJECTIVE:  Estimated body mass index is 23.86 kg/m² as calculated from the following:    Height as of 2/11/22: 5' 4\" (1.626 m). Weight as of 6/28/22: 139 lb (63 kg). Vitals:    01/09/23 1103   BP: 116/70   Pulse: 52   SpO2: 95%   Weight: 148 lb (67.1 kg)       Physical Exam  Vitals and nursing note reviewed. Constitutional:       General: She is not in acute distress. Appearance: She is well-developed. She is not diaphoretic. HENT:      Head: Normocephalic and atraumatic.       Right Ear: External ear normal.      Left Ear: External ear normal.      Nose: Nose normal.   Eyes:      General: Lids are normal. No scleral icterus. Right eye: No discharge. Left eye: No discharge. Pupils: Pupils are equal, round, and reactive to light. Neck:      Thyroid: No thyromegaly. Vascular: No JVD. Cardiovascular:      Rate and Rhythm: Normal rate and regular rhythm. Heart sounds: Normal heart sounds. Pulmonary:      Effort: Pulmonary effort is normal. No respiratory distress. Breath sounds: Normal breath sounds. Abdominal:      Palpations: Abdomen is soft. There is no hepatomegaly or splenomegaly. Tenderness: There is no abdominal tenderness. Musculoskeletal:      Right lower leg: No edema. Left lower leg: No edema. Skin:     General: Skin is warm and dry. Coloration: Skin is not pale. Findings: No erythema or rash. Comments: Turgor normal   Neurological:      Mental Status: She is oriented to person, place, and time. Psychiatric:         Mood and Affect: Mood normal.         Behavior: Behavior normal.         Thought Content: Thought content normal.         Cognition and Memory: She exhibits impaired remote memory. Judgment: Judgment normal.            ASSESSMENT PLAN      Diagnosis Orders   1. Dementia without behavioral disturbance (HCC)  memantine (NAMENDA) 5 MG tablet      2. Mixed hyperlipidemia        3. Memory loss        4. Leg cramps        5. Lumbar degenerative disc disease        6. Mild cognitive impairment        Patient would like to try something else for her memory. Previously her daughters told her that they thought donepezil was making her worse. We will start with Namenda titrate slowly due to previous side effects with donepezil. Lipids will be monitored based upon levels requiring treatment and other cardiac risks.   Medications for hyperlipidemia and hypertriglyceridemia as listed on the medication list will be changed as necessary to reach control parameters. Leg cramps have improved. Her back is about the same. Follow-up 6 months    Patient should call the office immediately with new or ongoing signs or symptoms or worsening, or proceed to the emergency room. No changes in past medical history, past surgical history, social history, or family history were noted during the patient encounter unless specifically listed above. All updates of past medical history, past surgical history, social history, or family history were reviewed personally by me during the office visit. All problems listed in the assessment are stable unless noted otherwise. Medication profile reviewed personally by me during the visit. Medication side effects and possible impairments from medications were discussed as applicable. Unless stated otherwise, we will continue current medications as listed in the medication review report contained in the patient's medical record. This document was prepared by a combination of typing and transcription through a voice recognition software. Scribe attestation: Lynnette Schultz RN, am scribing for and in the presence of Kirt Medina MD. Electronically signed by Antoinette Santamaria RN on 1/9/2023 at 10:59 AM      Provider attestation:     I, Dr. Junior Bowser, personally performed the services described in this documentation, as scribed by the above signed scribe in my presence, and it is both accurate and complete. I agree with the ROS and Past Histories independently gathered by the clinical support staff and the remaining scribed note accurately describes my personal service to the patient.       1/9/2023    12:19 PM

## 2023-01-09 NOTE — PATIENT INSTRUCTIONS

## 2023-01-23 NOTE — TELEPHONE ENCOUNTER
Dr Mehrdad Banks patient  Pt c/b #[phone]  Pt stated  Requesting appt due to having extreme side effects which are diarrhea, headaches, loosing weight, from medication Otezla.  Last ov 7.24.20  Please c/b to discuss [FreeTextEntry1] : Pt is a 43 y/o F who is referred here today by their PCP for evaluation.  At age 35 she had phlebitis while pregnancy, f/u with vasc and had procedure.  She recently saw vascular, history of varicose veins.  \par Pt reports feeling well and has no active cardiac complaints - denies CP, SOB, palpitations, dizziness, syncope, edema, orthopnea, PND, orthopnea.  No exertional symptoms.   \par \par PMH: Hasimotos\par Family hx: grandfather MI\par Smoking status: never\par social ETOH\par no drug use\par Current exercise: none\par Daily water intake: >64oz\par Daily caffeine intake: none\par OTC medications: claritin\par Previous cardiac testing: none\par Previous hospitalizations: sinus surgery - no problems with anesthesia\par 3 children - no problem with pregnancies\par

## 2023-03-06 DIAGNOSIS — M85.80 OSTEOPENIA: ICD-10-CM

## 2023-03-06 RX ORDER — IBANDRONATE SODIUM 150 MG/1
150 TABLET, FILM COATED ORAL
Qty: 3 TABLET | Refills: 1 | Status: SHIPPED | OUTPATIENT
Start: 2023-03-06

## 2023-03-06 NOTE — TELEPHONE ENCOUNTER
----- Message from Denise Reddy MD sent at 3/3/2023 10:30 AM EST -----  Yes, and take OsCal D or equivalent 1 po bid

## 2023-03-20 RX ORDER — LEVOTHYROXINE SODIUM 0.07 MG/1
TABLET ORAL
Qty: 90 TABLET | Refills: 0 | Status: SHIPPED | OUTPATIENT
Start: 2023-03-20

## 2023-03-20 NOTE — TELEPHONE ENCOUNTER
Future appt scheduled 07/12/2023            Last appt 01/09/2023      Last Written 12/19/2022    levothyroxine (SYNTHROID) 75 MCG tablet  #90  0 RF

## 2023-04-27 NOTE — TELEPHONE ENCOUNTER
Pt calling states she is still having diarrhea from taking pepto bismol want to know what else she can take pls return patient call back @ [phone] thank you [FreeTextEntry1] : A case of hypertension with proteinuria, h/o CVA, necrotizing pancreatitis, portal vein thrombosis, and hypothyroidism has been referred for proteinuria.  \par Patient has lost 19 lbs. BP is controlled. Cause of proteinuria may be related to hypertension. Advised to rule out other causes including serological and structural including renal sonogram.\par Lab tests discussed with the patient. Renal function normal. Serological markers within acceptable range. Advised to d/c amlodipine and start an ARB to decrease proteinuria. BMP after one week.

## 2023-05-30 RX ORDER — LEVOTHYROXINE SODIUM 0.07 MG/1
TABLET ORAL
Qty: 90 TABLET | Refills: 0 | Status: SHIPPED | OUTPATIENT
Start: 2023-05-30

## 2023-05-30 NOTE — TELEPHONE ENCOUNTER
Javier Tatum is requesting refill(s)   Last OV 1/9/23 (pertaining to medication)  LR 3/20/23 (per medication requested)  Next office visit scheduled or attempted Yes   If no, reason:  7/12/23

## 2023-06-07 RX ORDER — LEVOTHYROXINE SODIUM 0.07 MG/1
TABLET ORAL
Qty: 90 TABLET | Refills: 0 | Status: SHIPPED | OUTPATIENT
Start: 2023-06-07

## 2023-06-19 DIAGNOSIS — F03.90 DEMENTIA WITHOUT BEHAVIORAL DISTURBANCE (HCC): ICD-10-CM

## 2023-06-20 RX ORDER — MEMANTINE HYDROCHLORIDE 5 MG/1
5 TABLET ORAL 2 TIMES DAILY
Qty: 180 TABLET | Refills: 3 | Status: SHIPPED | OUTPATIENT
Start: 2023-06-20

## 2023-07-11 SDOH — ECONOMIC STABILITY: FOOD INSECURITY: WITHIN THE PAST 12 MONTHS, YOU WORRIED THAT YOUR FOOD WOULD RUN OUT BEFORE YOU GOT MONEY TO BUY MORE.: NEVER TRUE

## 2023-07-11 SDOH — ECONOMIC STABILITY: TRANSPORTATION INSECURITY
IN THE PAST 12 MONTHS, HAS LACK OF TRANSPORTATION KEPT YOU FROM MEETINGS, WORK, OR FROM GETTING THINGS NEEDED FOR DAILY LIVING?: NO

## 2023-07-11 SDOH — ECONOMIC STABILITY: HOUSING INSECURITY
IN THE LAST 12 MONTHS, WAS THERE A TIME WHEN YOU DID NOT HAVE A STEADY PLACE TO SLEEP OR SLEPT IN A SHELTER (INCLUDING NOW)?: NO

## 2023-07-11 SDOH — ECONOMIC STABILITY: FOOD INSECURITY: WITHIN THE PAST 12 MONTHS, THE FOOD YOU BOUGHT JUST DIDN'T LAST AND YOU DIDN'T HAVE MONEY TO GET MORE.: NEVER TRUE

## 2023-07-11 SDOH — ECONOMIC STABILITY: INCOME INSECURITY: HOW HARD IS IT FOR YOU TO PAY FOR THE VERY BASICS LIKE FOOD, HOUSING, MEDICAL CARE, AND HEATING?: NOT VERY HARD

## 2023-07-12 ENCOUNTER — OFFICE VISIT (OUTPATIENT)
Dept: FAMILY MEDICINE CLINIC | Age: 83
End: 2023-07-12

## 2023-07-12 VITALS
HEART RATE: 62 BPM | SYSTOLIC BLOOD PRESSURE: 138 MMHG | BODY MASS INDEX: 26.78 KG/M2 | WEIGHT: 156 LBS | DIASTOLIC BLOOD PRESSURE: 88 MMHG | OXYGEN SATURATION: 99 %

## 2023-07-12 DIAGNOSIS — R05.2 SUBACUTE COUGH: Primary | ICD-10-CM

## 2023-07-12 DIAGNOSIS — E78.2 MIXED HYPERLIPIDEMIA: ICD-10-CM

## 2023-07-12 DIAGNOSIS — R09.82 POST-NASAL DISCHARGE: ICD-10-CM

## 2023-07-12 DIAGNOSIS — F03.90 DEMENTIA WITHOUT BEHAVIORAL DISTURBANCE (HCC): ICD-10-CM

## 2023-07-12 DIAGNOSIS — R25.2 LEG CRAMPS: ICD-10-CM

## 2023-07-12 DIAGNOSIS — E05.00 GRAVES DISEASE: ICD-10-CM

## 2023-07-12 DIAGNOSIS — M79.89 RIGHT LEG SWELLING: ICD-10-CM

## 2023-07-12 LAB
BASOPHILS # BLD: 0.1 K/UL (ref 0–0.2)
BASOPHILS NFR BLD: 1 %
DEPRECATED RDW RBC AUTO: 14.4 % (ref 12.4–15.4)
EOSINOPHIL # BLD: 0.1 K/UL (ref 0–0.6)
EOSINOPHIL NFR BLD: 1.1 %
HCT VFR BLD AUTO: 40.8 % (ref 36–48)
HGB BLD-MCNC: 13.7 G/DL (ref 12–16)
LYMPHOCYTES # BLD: 1 K/UL (ref 1–5.1)
LYMPHOCYTES NFR BLD: 17.2 %
MCH RBC QN AUTO: 31.4 PG (ref 26–34)
MCHC RBC AUTO-ENTMCNC: 33.6 G/DL (ref 31–36)
MCV RBC AUTO: 93.6 FL (ref 80–100)
MONOCYTES # BLD: 0.5 K/UL (ref 0–1.3)
MONOCYTES NFR BLD: 8.9 %
NEUTROPHILS # BLD: 4.1 K/UL (ref 1.7–7.7)
NEUTROPHILS NFR BLD: 71.8 %
PLATELET # BLD AUTO: 188 K/UL (ref 135–450)
PMV BLD AUTO: 9.4 FL (ref 5–10.5)
RBC # BLD AUTO: 4.36 M/UL (ref 4–5.2)
T4 FREE SERPL-MCNC: 1.8 NG/DL (ref 0.9–1.8)
TSH SERPL DL<=0.005 MIU/L-ACNC: 0.99 UIU/ML (ref 0.27–4.2)
WBC # BLD AUTO: 5.8 K/UL (ref 4–11)

## 2023-07-12 NOTE — PROGRESS NOTES
Chief Complaint   Patient presents with    Hyperlipidemia        Internal Administration   First Dose COVID-19, MODERNA BLUE border, Primary or Immunocompromised, (age 12y+), IM, 100 mcg/0.5mL  2021   Second Dose COVID-19, MODERNA BLUE border, Primary or Immunocompromised, (age 12y+), IM, 100 mcg/0.5mL   2021       Last COVID Lab SARS-CoV-2 (no units)   Date Value   2022 Not Detected             Wt Readings from Last 3 Encounters:   23 156 lb (70.8 kg)   23 148 lb (67.1 kg)   22 139 lb (63 kg)     BP Readings from Last 3 Encounters:   23 138/88   23 116/70   22 134/71      No results found for: LABA1C    HPI:  Santa Mendez is a 80 y.o. (: 1940) here today for    Patient states that her memory is about the same doesn't feel like an improvement has been noted with the addition of namenda. She states that her leg cramps are about the same. She is also starting to have some swelling in her legs. Discussed venous insufficiency causing the leg swelling. She states that she has been having a lingering cough. She states she has been getting phlegm up and admits to some sinus drainage. Discussed that a chest xray could be done. She denies any SOB and her lungs sound clear. Denied chest xray. Discussed sinus related cough. [] Patient has completed an advance directive  [x] Patient has NOT completed an advanced directive  [] Patient has a documented healthcare surrogate  [x] Patient does NOT have a documented healthcare surrogate  [] Discussed the importance of establishing and updating an advanced directive. Patient has questions at this time and those were answered. [x] Discussed the importance of establishing and updating an advanced directive. Patient does NOT have questions at this time.     Discussed with: [x] Patient            [] Family             [] Other caregiver    Patient's medications, allergies, past medical, surgical, social

## 2023-07-13 LAB
ALBUMIN SERPL-MCNC: 4.3 G/DL (ref 3.4–5)
ALBUMIN/GLOB SERPL: 2 {RATIO} (ref 1.1–2.2)
ALP SERPL-CCNC: 116 U/L (ref 40–129)
ALT SERPL-CCNC: 10 U/L (ref 10–40)
ANION GAP SERPL CALCULATED.3IONS-SCNC: 12 MMOL/L (ref 3–16)
AST SERPL-CCNC: 18 U/L (ref 15–37)
BILIRUB SERPL-MCNC: 0.5 MG/DL (ref 0–1)
BUN SERPL-MCNC: 18 MG/DL (ref 7–20)
CALCIUM SERPL-MCNC: 9.3 MG/DL (ref 8.3–10.6)
CHLORIDE SERPL-SCNC: 106 MMOL/L (ref 99–110)
CHOLEST SERPL-MCNC: 213 MG/DL (ref 0–199)
CO2 SERPL-SCNC: 25 MMOL/L (ref 21–32)
CREAT SERPL-MCNC: 1.1 MG/DL (ref 0.6–1.2)
GFR SERPLBLD CREATININE-BSD FMLA CKD-EPI: 50 ML/MIN/{1.73_M2}
GLUCOSE SERPL-MCNC: 84 MG/DL (ref 70–99)
HDLC SERPL-MCNC: 82 MG/DL (ref 40–60)
LDLC SERPL CALC-MCNC: 112 MG/DL
MAGNESIUM SERPL-MCNC: 2.2 MG/DL (ref 1.8–2.4)
POTASSIUM SERPL-SCNC: 4.8 MMOL/L (ref 3.5–5.1)
PROT SERPL-MCNC: 6.5 G/DL (ref 6.4–8.2)
SODIUM SERPL-SCNC: 143 MMOL/L (ref 136–145)
TRIGL SERPL-MCNC: 95 MG/DL (ref 0–150)
VLDLC SERPL CALC-MCNC: 19 MG/DL

## 2023-07-17 ENCOUNTER — TELEMEDICINE (OUTPATIENT)
Dept: FAMILY MEDICINE CLINIC | Age: 83
End: 2023-07-17

## 2023-07-17 DIAGNOSIS — Z00.00 MEDICARE ANNUAL WELLNESS VISIT, SUBSEQUENT: Primary | ICD-10-CM

## 2023-07-17 ASSESSMENT — PATIENT HEALTH QUESTIONNAIRE - PHQ9
4. FEELING TIRED OR HAVING LITTLE ENERGY: 1
SUM OF ALL RESPONSES TO PHQ9 QUESTIONS 1 & 2: 2
8. MOVING OR SPEAKING SO SLOWLY THAT OTHER PEOPLE COULD HAVE NOTICED. OR THE OPPOSITE, BEING SO FIGETY OR RESTLESS THAT YOU HAVE BEEN MOVING AROUND A LOT MORE THAN USUAL: 1
5. POOR APPETITE OR OVEREATING: 0
2. FEELING DOWN, DEPRESSED OR HOPELESS: 1
SUM OF ALL RESPONSES TO PHQ QUESTIONS 1-9: 5
10. IF YOU CHECKED OFF ANY PROBLEMS, HOW DIFFICULT HAVE THESE PROBLEMS MADE IT FOR YOU TO DO YOUR WORK, TAKE CARE OF THINGS AT HOME, OR GET ALONG WITH OTHER PEOPLE: 1
3. TROUBLE FALLING OR STAYING ASLEEP: 0
1. LITTLE INTEREST OR PLEASURE IN DOING THINGS: 1
9. THOUGHTS THAT YOU WOULD BE BETTER OFF DEAD, OR OF HURTING YOURSELF: 0
SUM OF ALL RESPONSES TO PHQ QUESTIONS 1-9: 5
7. TROUBLE CONCENTRATING ON THINGS, SUCH AS READING THE NEWSPAPER OR WATCHING TELEVISION: 0
SUM OF ALL RESPONSES TO PHQ QUESTIONS 1-9: 5
6. FEELING BAD ABOUT YOURSELF - OR THAT YOU ARE A FAILURE OR HAVE LET YOURSELF OR YOUR FAMILY DOWN: 1
SUM OF ALL RESPONSES TO PHQ QUESTIONS 1-9: 5

## 2023-07-17 ASSESSMENT — LIFESTYLE VARIABLES
HAS A RELATIVE, FRIEND, DOCTOR, OR ANOTHER HEALTH PROFESSIONAL EXPRESSED CONCERN ABOUT YOUR DRINKING OR SUGGESTED YOU CUT DOWN: 0
HOW MANY STANDARD DRINKS CONTAINING ALCOHOL DO YOU HAVE ON A TYPICAL DAY: 1 OR 2
HOW OFTEN DURING THE LAST YEAR HAVE YOU FAILED TO DO WHAT WAS NORMALLY EXPECTED FROM YOU BECAUSE OF DRINKING: 0
HOW OFTEN DO YOU HAVE A DRINK CONTAINING ALCOHOL: 4 OR MORE TIMES A WEEK
HOW OFTEN DURING THE LAST YEAR HAVE YOU HAD A FEELING OF GUILT OR REMORSE AFTER DRINKING: 0
HAVE YOU OR SOMEONE ELSE BEEN INJURED AS A RESULT OF YOUR DRINKING: 0
HOW OFTEN DURING THE LAST YEAR HAVE YOU FOUND THAT YOU WERE NOT ABLE TO STOP DRINKING ONCE YOU HAD STARTED: 0
HOW OFTEN DURING THE LAST YEAR HAVE YOU NEEDED AN ALCOHOLIC DRINK FIRST THING IN THE MORNING TO GET YOURSELF GOING AFTER A NIGHT OF HEAVY DRINKING: 0
HOW OFTEN DURING THE LAST YEAR HAVE YOU BEEN UNABLE TO REMEMBER WHAT HAPPENED THE NIGHT BEFORE BECAUSE YOU HAD BEEN DRINKING: 0

## 2023-07-17 NOTE — PROGRESS NOTES
Medicare Annual Wellness Visit    Daniel Mitatl is here for Medicare AWV    Assessment & Plan   Medicare annual wellness visit, subsequent  Recommendations for Preventive Services Due: see orders and patient instructions/AVS.  Recommended screening schedule for the next 5-10 years is provided to the patient in written form: see Patient Instructions/AVS.     No follow-ups on file. Subjective     Patient's complete Health Risk Assessment and screening values have been reviewed and are found in Flowsheets. The following problems were reviewed today and where indicated follow up appointments were made and/or referrals ordered. Positive Risk Factor Screenings with Interventions:    Fall Risk:  Do you feel unsteady or are you worried about falling? : (!) yes  2 or more falls in past year?: (!) yes  Fall with injury in past year?: no     Interventions:    Patient declines any further evaluation or treatment     Depression:  PHQ-2 Score: 2  PHQ-9 Total Score: 5    Interpretation:   1-4 = minimal  5-9 = mild  10-14 = moderate  15-19 = moderately severe  20-27 = severe  Interventions:  Patient is taking medication to help with this. General HRA Questions:  Select all that apply: (!) New or Increased Fatigue    Fatigue Interventions:  Patient comments: thinks this is due to some of her medications that she takes. Weight and Activity:  Physical Activity: Inactive    Days of Exercise per Week: 0 days    Minutes of Exercise per Session: 0 min     On average, how many days per week do you engage in moderate to strenuous exercise (like a brisk walk)?: 0 days  Have you lost any weight without trying in the past 3 months?: No  There is no height or weight on file to calculate BMI.                    Objective      Patient-Reported Vitals  Patient-Reported Systolic (Top): 315 mmHg  Patient-Reported Diastolic (Bottom): 88 mmHg  Patient-Reported Pulse: 62  Patient-Reported Weight: 156 lbs       Allergies

## 2023-07-17 NOTE — PATIENT INSTRUCTIONS
Personalized Preventive Plan for Cheryle Buchanan - 7/17/2023  Medicare offers a range of preventive health benefits. Some of the tests and screenings are paid in full while other may be subject to a deductible, co-insurance, and/or copay. Some of these benefits include a comprehensive review of your medical history including lifestyle, illnesses that may run in your family, and various assessments and screenings as appropriate. After reviewing your medical record and screening and assessments performed today your provider may have ordered immunizations, labs, imaging, and/or referrals for you. A list of these orders (if applicable) as well as your Preventive Care list are included within your After Visit Summary for your review. Other Preventive Recommendations:    A preventive eye exam performed by an eye specialist is recommended every 1-2 years to screen for glaucoma; cataracts, macular degeneration, and other eye disorders. A preventive dental visit is recommended every 6 months. Try to get at least 150 minutes of exercise per week or 10,000 steps per day on a pedometer . Order or download the FREE \"Exercise & Physical Activity: Your Everyday Guide\" from [employer]. Call 9-203.925.3132 or search [employer] online. You need 8181-1270 mg of calcium and 2955-5210 IU of vitamin D per day. It is possible to meet your calcium requirement with diet alone, but a vitamin D supplement is usually necessary to meet this goal.  When exposed to the sun, use a sunscreen that protects against both UVA and UVB radiation with an SPF of 30 or greater. Reapply every 2 to 3 hours or after sweating, drying off with a towel, or swimming. Always wear a seat belt when traveling in a car. Always wear a helmet when riding a bicycle or motorcycle.

## 2023-07-24 DIAGNOSIS — F32.9 REACTIVE DEPRESSION: ICD-10-CM

## 2023-07-24 RX ORDER — ESCITALOPRAM OXALATE 10 MG/1
10 TABLET ORAL DAILY
Qty: 90 TABLET | Refills: 3 | Status: SHIPPED | OUTPATIENT
Start: 2023-07-24

## 2023-07-24 NOTE — TELEPHONE ENCOUNTER
Refill Request     CONFIRM preferrred pharmacy with the patient. If Mail Order Rx - Pend for 90 day refill. Last Seen: Last Seen Department: 7/17/2023  Last Seen by PCP: 7/17/2023    Last Written: 10/05/2021    If no future appointment scheduled, route STAFF MESSAGE with patient name to the [facility] for scheduling. Next Appointment:   Future Appointments   Date Time Provider [address] [place] [address]   1/16/2024 10:40 AM Mateus Montero MD [facility]  [place] [facility]       Message sent to  to schedule appt with patient?   N/A      Requested Prescriptions     Pending Prescriptions Disp Refills    escitalopram (LEXAPRO) 10 MG tablet 90 tablet 3     Sig: Take 1 tablet by mouth daily

## 2023-07-28 DIAGNOSIS — M85.80 OSTEOPENIA: ICD-10-CM

## 2023-07-28 RX ORDER — IBANDRONATE SODIUM 150 MG/1
TABLET, FILM COATED ORAL
Qty: 3 TABLET | Refills: 3 | Status: SHIPPED | OUTPATIENT
Start: 2023-07-28

## 2023-07-28 NOTE — TELEPHONE ENCOUNTER
Refill Request     CONFIRM preferrred pharmacy with the patient. If Mail Order Rx - Pend for 90 day refill. Last Seen: Last Seen Department: 7/17/2023  Last Seen by PCP: 7/17/2023    Last Written: 3/5/23    If no future appointment scheduled, route STAFF MESSAGE with patient name to the [facility] for scheduling. Next Appointment:   Future Appointments   Date Time Provider [address] [place] [address]   1/16/2024 10:40 AM [facility] [place] [facility]       Message sent to  to schedule appt with patient? YES      Requested Prescriptions     Pending Prescriptions Disp Refills    ibandronate (BONIVA) 150 MG tablet [Pharmacy Med Name: IBANDRONATE  150MG  TAB] 3 tablet 3     Sig: TAKE 1 TABLET BY MOUTH MONTHLY  WITH 8 OZ OF PLAIN WATER 60  MINUTES BEFORE FIRST FOOD, DRINK OR MEDS.  STAY UPRIGHT FOR 60  MINS

## 2023-08-09 NOTE — PROGRESS NOTES
Chief Complaint   Patient presents with    Hyperlipidemia    Spasms     leg cramps    Psoriasis        Internal Administration   First Dose COVID-19, Moderna, PF, 100mcg/0.5mL  2021   Second Dose COVID-19, Moderna, PF, 100mcg/0.5mL   2021       Last COVID Lab No results found for: SARS-COV-2, SARS-COV-2 RNA, SARS-COV-2, SARS-COV-2, SARS-COV-2 BY PCR, SARS-COV-2, SARS-COV-2, SARS-COV-2          Wt Readings from Last 3 Encounters:   10/04/21 139 lb (63 kg)   21 141 lb 3.2 oz (64 kg)   20 140 lb (63.5 kg)     BP Readings from Last 3 Encounters:   10/04/21 138/86   21 134/80   10/14/20 124/62      No results found for: LABA1C    HPI:  Martine Umana is a 80 y.o. (: 1940) here today for    Patient states that her nasal drainage is doing better on the flonase. She was taking mucinex some for congestion in her chest but much better now    She states that her leg cramps continue to be about the same. She admits that she is not drinking enough fluids as discussed prior. She will continue to try and increase her fluids. Will check her electrolytes and magnesium level today. Patient did again bring up her memory getting worse. Explained that we have already completed all the lab work to try and determine a reversible cause and have not found any cause. Explained it might be some dementia. Discussed various medications that are out there to slow dementia symptoms. Discussed the side effects associated with such medications as well. Explained that we could try one of these medications if she would like. Reminded that this is not a cure but it does slow down the progression. Appears from talking with patient that it is her short term memory loss.      [] Patient has completed an advance directive  [x] Patient has NOT completed an advanced directive  [] Patient has a documented healthcare surrogate  [x] Patient does NOT have a documented healthcare surrogate  [] Discussed the What Is The Reason For Today's Visit?: Full Body Skin Examination Additional History: Annual skin check, has a spot that stayed red after LN2. importance of establishing and updating an advanced directive. Patient has questions at this time and those were answered. [x] Discussed the importance of establishing and updating an advanced directive. Patient does NOT have questions at this time. Discussed with: [x] Patient            [] Family             [] Other caregiver    Patient's medications, allergies, past medical, surgical, social and family histories were reviewed and updated asappropriate on 10/4/2021 at 1:43 PM.    ROS:  Review of Systems    All other systems reviewed and are negative except as noted above on 10/4/2021 at 1:43 PM. Additional review of systems may be scanned into the media section ofthis medical record. Any responses requiring further intervention were pursued. Past Medical History:   Diagnosis Date    Closed nondisplaced fracture of distal phalanx of lesser toe of left foot 2017    DDD (degenerative disc disease)     Grave's disease     Hyperlipidemia     Lichen planus     Overactive bladder     Psoriasis     Radiculopathy      Family History   Problem Relation Age of Onset    Heart Disease Mother     Heart Disease Father      Social History     Socioeconomic History    Marital status:      Spouse name: Not on file    Number of children: Not on file    Years of education: Not on file    Highest education level: Not on file   Occupational History    Not on file   Tobacco Use    Smoking status: Former Smoker     Packs/day: 0.15     Years: 5.00     Pack years: 0.75     Types: Cigarettes     Quit date: 1998     Years since quittin.2    Smokeless tobacco: Never Used   Vaping Use    Vaping Use: Never used   Substance and Sexual Activity    Alcohol use:  Yes    Drug use: No    Sexual activity: Yes     Partners: Male   Other Topics Concern    Not on file   Social History Narrative    Not on file     Social Determinants of Health     Financial Resource Strain: Low Risk     Difficulty of Paying Living Expenses: Not hard at all   Food Insecurity: No Food Insecurity    Worried About Running Out of Food in the Last Year: Never true    Ran Out of Food in the Last Year: Never true   Transportation Needs:     Lack of Transportation (Medical):  Lack of Transportation (Non-Medical):    Physical Activity:     Days of Exercise per Week:     Minutes of Exercise per Session:    Stress:     Feeling of Stress :    Social Connections:     Frequency of Communication with Friends and Family:     Frequency of Social Gatherings with Friends and Family:     Attends M[employer]en Services:     Active Member of Clubs or Organizations:     Attends Club or Organization Meetings:     Marital Status:    Intimate Partner Violence:     Fear of Current or Ex-Partner:     Emotionally Abused:     Physically Abused:     Sexually Abused:      Prior to Visit Medications    Medication Sig Taking? Authorizing Provider   escitalopram (LEXAPRO) 10 MG tablet TAKE 1 TABLET DAILY Yes Kat Watts MD   Apremilast (OTEZLA) 30 MG TABS Take 30 mg by mouth 2 times daily Yes Edmundo Polk MD   ketoconazole (NIZORAL) 2 % shampoo Apply shampoo three times weekly (M, W, F) leave in 3-5 min, then rinse. May alternate with other shampoos Yes Edmundo Polk MD   loratadine (CLARITIN) 10 MG tablet Take 1 tablet by mouth daily Yes Kat Watts MD   levothyroxine (SYNTHROID) 50 MCG tablet TAKE 1 TABLET BY MOUTH EVERY DAY Yes Kat Watts MD   atorvastatin (LIPITOR) 10 MG tablet TAKE 1 TABLET DAILY Yes Kat Watts MD   fluticasone (FLONASE) 50 MCG/ACT nasal spray SPRAY 1 SPRAY INTO EACH NOSTRIL EVERY DAY Yes Kat Watts MD   Misc Natural Products (OSTEO BI-FLEX JOINT SHIELD PO) Take by mouth + VITAMIN D Yes Historical Provider, MD   ibandronate (BONIVA) 150 MG tablet Take 150 mg by mouth every 30 days Take 1 tablet every 30 days.  Yes Historical Provider, MD   tolterodine (DETROL LA) 4 MG ER capsule Take 4 mg by mouth daily. Yes Historical Provider, MD   timolol (BETIMOL) 0.5 % ophthalmic solution 1 drop 2 times daily. Yes Historical Provider, MD   fluocinonide (LIDEX) 0.05 % external solution Apply to scalp twice daily for two weeks, then taper to as needed with flares. Patient not taking: Reported on 10/4/2021  Valla Babinski, MD   clobetasol (TEMOVATE) 0.05 % cream Apply to affected area twice daily  Patient not taking: Reported on 4/30/2021  Gilmar Key MD     Allergies   Allergen Reactions    Bacitracin        OBJECTIVE:  Estimated body mass index is 23.86 kg/m² as calculated from the following:    Height as of 12/8/20: 5' 4\" (1.626 m). Weight as of this encounter: 139 lb (63 kg). Vitals:    10/04/21 1334   BP: 138/86   Pulse: 79   Temp: 97.2 °F (36.2 °C)   SpO2: 96%   Weight: 139 lb (63 kg)       Physical Exam  Vitals and nursing note reviewed. Constitutional:       General: She is not in acute distress. Appearance: She is well-developed. She is not diaphoretic. HENT:      Head: Normocephalic and atraumatic. Right Ear: External ear normal.      Left Ear: External ear normal.      Nose: Nose normal.   Eyes:      General: Lids are normal. No scleral icterus. Right eye: No discharge. Left eye: No discharge. Pupils: Pupils are equal, round, and reactive to light. Neck:      Thyroid: No thyromegaly. Vascular: No JVD. Cardiovascular:      Rate and Rhythm: Normal rate and regular rhythm. Heart sounds: Normal heart sounds. Pulmonary:      Effort: Pulmonary effort is normal. No respiratory distress. Breath sounds: Normal breath sounds. Abdominal:      Palpations: Abdomen is soft. There is no hepatomegaly or splenomegaly. Tenderness: There is no abdominal tenderness. Musculoskeletal:      Right lower leg: No edema. Left lower leg: No edema. Skin:     General: Skin is warm and dry.       Coloration: Skin is not pale. Findings: No erythema or rash. Comments: Turgor normal   Neurological:      Mental Status: She is oriented to person, place, and time. Psychiatric:         Mood and Affect: Mood normal.         Behavior: Behavior normal.         Thought Content: Thought content normal.         Judgment: Judgment normal.              ASSESSMENT PLAN      Diagnosis Orders   1. Memory loss     2. Mixed hyperlipidemia  atorvastatin (LIPITOR) 10 MG tablet   3. Leg cramps  COMPREHENSIVE METABOLIC PANEL    MAGNESIUM    MAGNESIUM    COMPREHENSIVE METABOLIC PANEL   4. Mild cognitive impairment  donepezil (ARICEPT) 5 MG tablet   5. Dementia without behavioral disturbance, unspecified dementia type (HCC)  donepezil (ARICEPT) 5 MG tablet   6. Graves disease     7. Fatigue, unspecified type     8. Situational anxiety     9. Reactive depression     Were going to add the Aricept. Explained the pros and cons of this medicine. Increase fluid intake for leg cramps. Still has some cough likely related to postnasal drainage. Has shortness of breath on occasion. Continue lipid monitoring as needed. Energy is about the same. Nerves seem to be doing okay. Update labs which are due follow-up 1 month to determine about increasing Aricept to 10 mg and then in 6 months for regular checkup. Patient should call the office immediately with new or ongoing signs or symptoms or worsening, or proceed to the emergency room. No changes in past medical history, past surgical history, social history, or family history were noted during the patient encounter unless specifically listed above. All updates of past medical history, past surgical history, social history, or family history were reviewed personally by me during the office visit. All problems listed in the assessment are stable unless noted otherwise. Medication profile reviewed personally by me during the visit.   Medication side effects and possible impairments from medications were discussed as applicable. This document was prepared by a combination of typing and transcription through a voice recognition software. Scribe attestation: Ra Peguero RN, am scribing for and in the presence of Ngoc Pham MD. Electronically signed by Fausto Peterson RN on 10/4/2021 at 1:43 PM      Provider attestation:     I, Dr. Melissa Haines, personally performed the services described in this documentation, as scribed by the above signed scribe in my presence, and it is both accurate and complete. I agree with the ROS and Past Histories independently gathered by the clinical support staff and the remaining scribed note accurately describes my personal service to the patient.       10/4/2021    2:02 PM

## 2023-08-31 RX ORDER — LEVOTHYROXINE SODIUM 0.07 MG/1
TABLET ORAL
Qty: 90 TABLET | Refills: 3 | Status: SHIPPED | OUTPATIENT
Start: 2023-08-31

## 2023-08-31 NOTE — TELEPHONE ENCOUNTER
Refill Request     CONFIRM preferrred pharmacy with the patient. If Mail Order Rx - Pend for 90 day refill. Last Seen: Last Seen Department: 7/17/2023  Last Seen by PCP: Visit date not found    Last Written: 6-7-2023    If no future appointment scheduled, route STAFF MESSAGE with patient name to the [facility] for scheduling. Next Appointment:   Future Appointments   Date Time Provider [address] [place] [address]   1/16/2024 10:40 AM Lucia Hess MD [facility]  [place] [facility]       Message sent to  to schedule appt with patient?   N/A      Requested Prescriptions     Pending Prescriptions Disp Refills    levothyroxine (SYNTHROID) 75 MCG tablet [Pharmacy Med Name: MYLAN-LEVOTHYROX 75MCG TABLET] 90 tablet 3     Sig: TAKE 1 TABLET BY MOUTH DAILY

## 2024-01-31 ENCOUNTER — OFFICE VISIT (OUTPATIENT)
Dept: FAMILY MEDICINE CLINIC | Age: 84
End: 2024-01-31
Payer: MEDICARE

## 2024-01-31 VITALS
BODY MASS INDEX: 27.12 KG/M2 | OXYGEN SATURATION: 97 % | HEART RATE: 70 BPM | WEIGHT: 158 LBS | DIASTOLIC BLOOD PRESSURE: 80 MMHG | SYSTOLIC BLOOD PRESSURE: 139 MMHG

## 2024-01-31 DIAGNOSIS — F32.9 REACTIVE DEPRESSION: ICD-10-CM

## 2024-01-31 DIAGNOSIS — F03.90 DEMENTIA WITHOUT BEHAVIORAL DISTURBANCE (HCC): Primary | ICD-10-CM

## 2024-01-31 DIAGNOSIS — F41.8 SITUATIONAL ANXIETY: ICD-10-CM

## 2024-01-31 DIAGNOSIS — E05.00 GRAVES DISEASE: ICD-10-CM

## 2024-01-31 DIAGNOSIS — E78.2 MIXED HYPERLIPIDEMIA: ICD-10-CM

## 2024-01-31 PROCEDURE — 99214 OFFICE O/P EST MOD 30 MIN: CPT | Performed by: FAMILY MEDICINE

## 2024-01-31 PROCEDURE — 1090F PRES/ABSN URINE INCON ASSESS: CPT | Performed by: FAMILY MEDICINE

## 2024-01-31 PROCEDURE — G8417 CALC BMI ABV UP PARAM F/U: HCPCS | Performed by: FAMILY MEDICINE

## 2024-01-31 PROCEDURE — G8427 DOCREV CUR MEDS BY ELIG CLIN: HCPCS | Performed by: FAMILY MEDICINE

## 2024-01-31 PROCEDURE — 1036F TOBACCO NON-USER: CPT | Performed by: FAMILY MEDICINE

## 2024-01-31 PROCEDURE — G8399 PT W/DXA RESULTS DOCUMENT: HCPCS | Performed by: FAMILY MEDICINE

## 2024-01-31 PROCEDURE — G8484 FLU IMMUNIZE NO ADMIN: HCPCS | Performed by: FAMILY MEDICINE

## 2024-01-31 PROCEDURE — 1123F ACP DISCUSS/DSCN MKR DOCD: CPT | Performed by: FAMILY MEDICINE

## 2024-01-31 RX ORDER — MEMANTINE HYDROCHLORIDE 10 MG/1
10 TABLET ORAL 2 TIMES DAILY
Qty: 60 TABLET | Refills: 5 | Status: SHIPPED | OUTPATIENT
Start: 2024-01-31

## 2024-01-31 ASSESSMENT — PATIENT HEALTH QUESTIONNAIRE - PHQ9
1. LITTLE INTEREST OR PLEASURE IN DOING THINGS: 0
10. IF YOU CHECKED OFF ANY PROBLEMS, HOW DIFFICULT HAVE THESE PROBLEMS MADE IT FOR YOU TO DO YOUR WORK, TAKE CARE OF THINGS AT HOME, OR GET ALONG WITH OTHER PEOPLE: 0
9. THOUGHTS THAT YOU WOULD BE BETTER OFF DEAD, OR OF HURTING YOURSELF: 0
SUM OF ALL RESPONSES TO PHQ QUESTIONS 1-9: 0
SUM OF ALL RESPONSES TO PHQ QUESTIONS 1-9: 0
SUM OF ALL RESPONSES TO PHQ9 QUESTIONS 1 & 2: 0
4. FEELING TIRED OR HAVING LITTLE ENERGY: 0
7. TROUBLE CONCENTRATING ON THINGS, SUCH AS READING THE NEWSPAPER OR WATCHING TELEVISION: 0
SUM OF ALL RESPONSES TO PHQ QUESTIONS 1-9: 0
8. MOVING OR SPEAKING SO SLOWLY THAT OTHER PEOPLE COULD HAVE NOTICED. OR THE OPPOSITE, BEING SO FIGETY OR RESTLESS THAT YOU HAVE BEEN MOVING AROUND A LOT MORE THAN USUAL: 0
2. FEELING DOWN, DEPRESSED OR HOPELESS: 0
6. FEELING BAD ABOUT YOURSELF - OR THAT YOU ARE A FAILURE OR HAVE LET YOURSELF OR YOUR FAMILY DOWN: 0
3. TROUBLE FALLING OR STAYING ASLEEP: 0
5. POOR APPETITE OR OVEREATING: 0
SUM OF ALL RESPONSES TO PHQ QUESTIONS 1-9: 0

## 2024-01-31 NOTE — PROGRESS NOTES
Chief Complaint   Patient presents with    Dementia        Internal Administration   First Dose COVID-19, MODERNA BLUE border, Primary or Immunocompromised, (age 12y+), IM, 100 mcg/0.5mL  2021   Second Dose COVID-19, MODERNA BLUE border, Primary or Immunocompromised, (age 12y+), IM, 100 mcg/0.5mL   2021       Last COVID Lab SARS-CoV-2 (no units)   Date Value   2022 Not Detected             Wt Readings from Last 3 Encounters:   23 70.8 kg (156 lb)   23 67.1 kg (148 lb)   22 63 kg (139 lb)     BP Readings from Last 3 Encounters:   23 138/88   23 116/70   22 134/71      No results found for: \"LABA1C\"    HPI:  Laura Alvarez is a 83 y.o. (: 1940) here today for    Follow up on chronic conditions.    [] Patient has completed an advance directive  [x] Patient has NOT completed an advanced directive  [] Patient has a documented healthcare surrogate  [] Patient does NOT have a documented healthcare surrogate  [] Discussed the importance of establishing and updating an advanced directive.  Patient has questions at this time and those were answered.  [] Discussed the importance of establishing and updating an advanced directive.  Patient does NOT have questions at this time.    Discussed with: [x] Patient            [] Family             [] Other caregiver    Patient's medications, allergies, past medical, surgical, social and family histories were reviewed and updated asappropriate on 2024 at 7:57 AM.    ROS:  Review of Systems    All other systems reviewed and are negative except as noted above on 2024 at 7:57 AM. Additional review of systems may be scanned into the media section ofthis medical record.  Any responses requiring further intervention were pursued.    Past Medical History:   Diagnosis Date    Closed nondisplaced fracture of distal phalanx of lesser toe of left foot 2017    DDD (degenerative disc disease)     Grave's disease

## 2024-05-04 DIAGNOSIS — F32.9 REACTIVE DEPRESSION: ICD-10-CM

## 2024-05-06 RX ORDER — ESCITALOPRAM OXALATE 10 MG/1
10 TABLET ORAL DAILY
Qty: 90 TABLET | Refills: 3 | Status: SHIPPED | OUTPATIENT
Start: 2024-05-06

## 2024-05-06 NOTE — TELEPHONE ENCOUNTER
Refill Request     CONFIRM preferrred pharmacy with the patient.    If Mail Order Rx - Pend for 90 day refill.      Last Seen: Last Seen Department: 1/31/2024  Last Seen by PCP: 1/31/2024    Last Written: 7-    If no future appointment scheduled, route STAFF MESSAGE with patient name to the  Pool for scheduling.      Next Appointment:   Future Appointments   Date Time Provider Department Center   7/31/2024 10:20 AM [facility] [place] [facility]       Message sent to  to schedule appt with patient?  N/A      Requested Prescriptions     Pending Prescriptions Disp Refills    escitalopram (LEXAPRO) 10 MG tablet [Pharmacy Med Name: Escitalopram Oxalate 10 MG Oral Tablet] 90 tablet 3     Sig: TAKE 1 TABLET BY MOUTH DAILY

## 2024-06-04 DIAGNOSIS — N32.81 OAB (OVERACTIVE BLADDER): ICD-10-CM

## 2024-06-04 RX ORDER — TOLTERODINE 4 MG/1
4 CAPSULE, EXTENDED RELEASE ORAL DAILY
Qty: 30 CAPSULE | Refills: 2 | Status: SHIPPED | OUTPATIENT
Start: 2024-06-04

## 2024-06-04 NOTE — TELEPHONE ENCOUNTER
Refill Request     CONFIRM preferrred pharmacy with the patient.    If Mail Order Rx - Pend for 90 day refill.      Last Seen: Last Seen Department: 1/31/2024  Last Seen by PCP: 1/31/2024    Last Written: not sure but she stated that the provider that gave her these passed away  and she is out    If no future appointment scheduled, route STAFF MESSAGE with patient name to the  Pool for scheduling.      Next Appointment:   Future Appointments   Date Time Provider Department Center   7/31/2024 10:20 AM [facility] [place] [facility]       Message sent to  to schedule appt with patient?  N/A      Requested Prescriptions     Pending Prescriptions Disp Refills    tolterodine (DETROL LA) 4 MG extended release capsule 30 capsule 3     Sig: Take 1 capsule by mouth daily

## 2024-06-10 DIAGNOSIS — M85.80 OSTEOPENIA: ICD-10-CM

## 2024-06-11 RX ORDER — IBANDRONATE SODIUM 150 MG/1
TABLET, FILM COATED ORAL
Qty: 3 TABLET | Refills: 3 | Status: SHIPPED | OUTPATIENT
Start: 2024-06-11

## 2024-07-10 RX ORDER — LEVOTHYROXINE SODIUM 0.07 MG/1
TABLET ORAL
Qty: 90 TABLET | Refills: 3 | Status: SHIPPED | OUTPATIENT
Start: 2024-07-10

## 2024-07-10 NOTE — TELEPHONE ENCOUNTER
Refill Request     CONFIRM preferrred pharmacy with the patient.    If Mail Order Rx - Pend for 90 day refill.      Last Seen: Last Seen Department: 1/31/2024  Last Seen by PCP: Visit date not found    Last Written: 8/31/23    If no future appointment scheduled, route STAFF MESSAGE with patient name to the  Pool for scheduling.      Next Appointment:   Future Appointments   Date Time Provider Department Center   7/31/2024 10:20 AM Dirk Montana MD [facility] [place] [facility]       Message sent to  to schedule appt with patient?  N/A      Requested Prescriptions     Pending Prescriptions Disp Refills    levothyroxine (SYNTHROID) 75 MCG tablet [Pharmacy Med Name: MYLAN-LEVOTHYROX 75MCG TABLET] 90 tablet 3     Sig: TAKE 1 TABLET BY MOUTH DAILY

## 2024-07-31 ENCOUNTER — OFFICE VISIT (OUTPATIENT)
Dept: FAMILY MEDICINE CLINIC | Age: 84
End: 2024-07-31

## 2024-07-31 VITALS
DIASTOLIC BLOOD PRESSURE: 86 MMHG | OXYGEN SATURATION: 98 % | SYSTOLIC BLOOD PRESSURE: 138 MMHG | BODY MASS INDEX: 26.78 KG/M2 | HEART RATE: 54 BPM | WEIGHT: 156 LBS

## 2024-07-31 DIAGNOSIS — N32.81 OAB (OVERACTIVE BLADDER): ICD-10-CM

## 2024-07-31 DIAGNOSIS — E05.00 GRAVES DISEASE: ICD-10-CM

## 2024-07-31 DIAGNOSIS — E78.2 MIXED HYPERLIPIDEMIA: Primary | ICD-10-CM

## 2024-07-31 DIAGNOSIS — F03.90 DEMENTIA WITHOUT BEHAVIORAL DISTURBANCE (HCC): ICD-10-CM

## 2024-07-31 PROBLEM — F41.8 SITUATIONAL ANXIETY: Status: RESOLVED | Noted: 2019-03-12 | Resolved: 2024-07-31

## 2024-07-31 PROBLEM — R09.82 POST-NASAL DISCHARGE: Status: RESOLVED | Noted: 2020-10-14 | Resolved: 2024-07-31

## 2024-07-31 PROBLEM — M79.89 RIGHT LEG SWELLING: Status: RESOLVED | Noted: 2022-06-28 | Resolved: 2024-07-31

## 2024-07-31 PROBLEM — F32.9 REACTIVE DEPRESSION: Status: RESOLVED | Noted: 2019-03-12 | Resolved: 2024-07-31

## 2024-07-31 PROBLEM — G31.84 MILD COGNITIVE IMPAIRMENT: Status: RESOLVED | Noted: 2020-03-10 | Resolved: 2024-07-31

## 2024-07-31 RX ORDER — TOLTERODINE 4 MG/1
4 CAPSULE, EXTENDED RELEASE ORAL DAILY
Qty: 90 CAPSULE | Refills: 3 | Status: SHIPPED | OUTPATIENT
Start: 2024-07-31

## 2024-07-31 SDOH — ECONOMIC STABILITY: FOOD INSECURITY: WITHIN THE PAST 12 MONTHS, THE FOOD YOU BOUGHT JUST DIDN'T LAST AND YOU DIDN'T HAVE MONEY TO GET MORE.: NEVER TRUE

## 2024-07-31 SDOH — ECONOMIC STABILITY: FOOD INSECURITY: WITHIN THE PAST 12 MONTHS, YOU WORRIED THAT YOUR FOOD WOULD RUN OUT BEFORE YOU GOT MONEY TO BUY MORE.: NEVER TRUE

## 2024-07-31 SDOH — ECONOMIC STABILITY: INCOME INSECURITY: HOW HARD IS IT FOR YOU TO PAY FOR THE VERY BASICS LIKE FOOD, HOUSING, MEDICAL CARE, AND HEATING?: NOT HARD AT ALL

## 2024-07-31 NOTE — PROGRESS NOTES
Chief Complaint   Patient presents with    Dementia        Internal Administration   First Dose COVID-19, MODERNA BLUE border, Primary or Immunocompromised, (age 12y+), IM, 100 mcg/0.5mL  2021   Second Dose COVID-19, MODERNA BLUE border, Primary or Immunocompromised, (age 12y+), IM, 100 mcg/0.5mL   2021       Last COVID Lab SARS-CoV-2 (no units)   Date Value   2022 Not Detected             Wt Readings from Last 3 Encounters:   24 70.8 kg (156 lb)   24 71.7 kg (158 lb)   23 70.8 kg (156 lb)     BP Readings from Last 3 Encounters:   24 138/86   24 139/80   23 138/88      No results found for: \"LABA1C\"    HPI:  Laura Alvarez is a 83 y.o. (: 1940) here today for  See below    [] Patient has completed an advance directive  [] Patient has NOT completed an advanced directive  [] Patient has a documented healthcare surrogate  [] Patient does NOT have a documented healthcare surrogate  [] Discussed the importance of establishing and updating an advanced directive.  Patient has questions at this time and those were answered.  [] Discussed the importance of establishing and updating an advanced directive.  Patient does NOT have questions at this time.    Discussed with: [] Patient            [] Family             [] Other caregiver    Patient's medications, allergies, past medical, surgical, social and family histories were reviewed and updated asappropriate on 2024 at 10:59 AM.    ROS:  Review of Systems    All other systems reviewed and are negative except as noted above on 2024 at 10:59 AM. Additional review of systems may be scanned into the media section ofthis medical record.  Any responses requiring further intervention were pursued.    Past Medical History:   Diagnosis Date    Closed nondisplaced fracture of distal phalanx of lesser toe of left foot 2017    DDD (degenerative disc disease)     Grave's disease     Hyperlipidemia

## 2024-08-01 LAB
ALBUMIN SERPL-MCNC: 4 G/DL (ref 3.4–5)
ALBUMIN/GLOB SERPL: 1.6 {RATIO} (ref 1.1–2.2)
ALP SERPL-CCNC: 122 U/L (ref 40–129)
ALT SERPL-CCNC: 9 U/L (ref 10–40)
ANION GAP SERPL CALCULATED.3IONS-SCNC: 15 MMOL/L (ref 3–16)
AST SERPL-CCNC: 15 U/L (ref 15–37)
BASOPHILS # BLD: 0 K/UL (ref 0–0.2)
BASOPHILS NFR BLD: 0.2 %
BILIRUB SERPL-MCNC: 0.5 MG/DL (ref 0–1)
BUN SERPL-MCNC: 20 MG/DL (ref 7–20)
CALCIUM SERPL-MCNC: 9 MG/DL (ref 8.3–10.6)
CHLORIDE SERPL-SCNC: 104 MMOL/L (ref 99–110)
CHOLEST SERPL-MCNC: 214 MG/DL (ref 0–199)
CO2 SERPL-SCNC: 23 MMOL/L (ref 21–32)
CREAT SERPL-MCNC: 1.1 MG/DL (ref 0.6–1.2)
DEPRECATED RDW RBC AUTO: 14.1 % (ref 12.4–15.4)
EOSINOPHIL # BLD: 0 K/UL (ref 0–0.6)
EOSINOPHIL NFR BLD: 0.6 %
GFR SERPLBLD CREATININE-BSD FMLA CKD-EPI: 50 ML/MIN/{1.73_M2}
GLUCOSE SERPL-MCNC: 99 MG/DL (ref 70–99)
HCT VFR BLD AUTO: 41.1 % (ref 36–48)
HDLC SERPL-MCNC: 75 MG/DL (ref 40–60)
HGB BLD-MCNC: 13.7 G/DL (ref 12–16)
LDLC SERPL CALC-MCNC: 116 MG/DL
LYMPHOCYTES # BLD: 1.1 K/UL (ref 1–5.1)
LYMPHOCYTES NFR BLD: 18.4 %
MAGNESIUM SERPL-MCNC: 2.3 MG/DL (ref 1.8–2.4)
MCH RBC QN AUTO: 31.1 PG (ref 26–34)
MCHC RBC AUTO-ENTMCNC: 33.4 G/DL (ref 31–36)
MCV RBC AUTO: 93.2 FL (ref 80–100)
MONOCYTES # BLD: 0.4 K/UL (ref 0–1.3)
MONOCYTES NFR BLD: 7.2 %
NEUTROPHILS # BLD: 4.3 K/UL (ref 1.7–7.7)
NEUTROPHILS NFR BLD: 73.6 %
PLATELET # BLD AUTO: 198 K/UL (ref 135–450)
PMV BLD AUTO: 10.2 FL (ref 5–10.5)
POTASSIUM SERPL-SCNC: 4.7 MMOL/L (ref 3.5–5.1)
PROT SERPL-MCNC: 6.5 G/DL (ref 6.4–8.2)
RBC # BLD AUTO: 4.41 M/UL (ref 4–5.2)
SODIUM SERPL-SCNC: 142 MMOL/L (ref 136–145)
T4 FREE SERPL-MCNC: 1.8 NG/DL (ref 0.9–1.8)
TRIGL SERPL-MCNC: 116 MG/DL (ref 0–150)
TSH SERPL DL<=0.005 MIU/L-ACNC: 0.87 UIU/ML (ref 0.27–4.2)
VLDLC SERPL CALC-MCNC: 23 MG/DL
WBC # BLD AUTO: 5.8 K/UL (ref 4–11)

## 2024-08-03 DIAGNOSIS — N32.81 OAB (OVERACTIVE BLADDER): ICD-10-CM

## 2024-08-05 RX ORDER — TOLTERODINE 4 MG/1
4 CAPSULE, EXTENDED RELEASE ORAL DAILY
Qty: 30 CAPSULE | Refills: 2 | Status: SHIPPED | OUTPATIENT
Start: 2024-08-05

## 2024-12-02 ENCOUNTER — TELEPHONE (OUTPATIENT)
Dept: FAMILY MEDICINE CLINIC | Age: 84
End: 2024-12-02

## 2024-12-09 ENCOUNTER — TELEMEDICINE (OUTPATIENT)
Dept: FAMILY MEDICINE CLINIC | Age: 84
End: 2024-12-09

## 2024-12-09 DIAGNOSIS — Z00.00 MEDICARE ANNUAL WELLNESS VISIT, SUBSEQUENT: Primary | ICD-10-CM

## 2024-12-09 ASSESSMENT — LIFESTYLE VARIABLES
HOW OFTEN DURING THE LAST YEAR HAVE YOU FAILED TO DO WHAT WAS NORMALLY EXPECTED FROM YOU BECAUSE OF DRINKING: NEVER
HAS A RELATIVE, FRIEND, DOCTOR, OR ANOTHER HEALTH PROFESSIONAL EXPRESSED CONCERN ABOUT YOUR DRINKING OR SUGGESTED YOU CUT DOWN: NO
HOW OFTEN DO YOU HAVE A DRINK CONTAINING ALCOHOL: 4 OR MORE TIMES A WEEK
HOW MANY STANDARD DRINKS CONTAINING ALCOHOL DO YOU HAVE ON A TYPICAL DAY: 1 OR 2
HOW OFTEN DURING THE LAST YEAR HAVE YOU FOUND THAT YOU WERE NOT ABLE TO STOP DRINKING ONCE YOU HAD STARTED: NEVER
HOW OFTEN DURING THE LAST YEAR HAVE YOU NEEDED AN ALCOHOLIC DRINK FIRST THING IN THE MORNING TO GET YOURSELF GOING AFTER A NIGHT OF HEAVY DRINKING: NEVER
HOW OFTEN DURING THE LAST YEAR HAVE YOU HAD A FEELING OF GUILT OR REMORSE AFTER DRINKING: NEVER
HAVE YOU OR SOMEONE ELSE BEEN INJURED AS A RESULT OF YOUR DRINKING: NO
HOW OFTEN DURING THE LAST YEAR HAVE YOU BEEN UNABLE TO REMEMBER WHAT HAPPENED THE NIGHT BEFORE BECAUSE YOU HAD BEEN DRINKING: NEVER

## 2024-12-09 ASSESSMENT — PATIENT HEALTH QUESTIONNAIRE - PHQ9
SUM OF ALL RESPONSES TO PHQ QUESTIONS 1-9: 1
6. FEELING BAD ABOUT YOURSELF - OR THAT YOU ARE A FAILURE OR HAVE LET YOURSELF OR YOUR FAMILY DOWN: NOT AT ALL
3. TROUBLE FALLING OR STAYING ASLEEP: NOT AT ALL
10. IF YOU CHECKED OFF ANY PROBLEMS, HOW DIFFICULT HAVE THESE PROBLEMS MADE IT FOR YOU TO DO YOUR WORK, TAKE CARE OF THINGS AT HOME, OR GET ALONG WITH OTHER PEOPLE: NOT DIFFICULT AT ALL
5. POOR APPETITE OR OVEREATING: NOT AT ALL
4. FEELING TIRED OR HAVING LITTLE ENERGY: NOT AT ALL
SUM OF ALL RESPONSES TO PHQ QUESTIONS 1-9: 1
SUM OF ALL RESPONSES TO PHQ QUESTIONS 1-9: 1
1. LITTLE INTEREST OR PLEASURE IN DOING THINGS: NOT AT ALL
2. FEELING DOWN, DEPRESSED OR HOPELESS: SEVERAL DAYS
7. TROUBLE CONCENTRATING ON THINGS, SUCH AS READING THE NEWSPAPER OR WATCHING TELEVISION: NOT AT ALL
SUM OF ALL RESPONSES TO PHQ9 QUESTIONS 1 & 2: 1
8. MOVING OR SPEAKING SO SLOWLY THAT OTHER PEOPLE COULD HAVE NOTICED. OR THE OPPOSITE, BEING SO FIGETY OR RESTLESS THAT YOU HAVE BEEN MOVING AROUND A LOT MORE THAN USUAL: NOT AT ALL
SUM OF ALL RESPONSES TO PHQ QUESTIONS 1-9: 1
9. THOUGHTS THAT YOU WOULD BE BETTER OFF DEAD, OR OF HURTING YOURSELF: NOT AT ALL

## 2024-12-09 NOTE — PROGRESS NOTES
(BONIVA) 150 MG tablet TAKE 1 TABLET BY MOUTH MONTHLY  WITH 8 OZ OF PLAIN WATER 60  MINUTES BEFORE FIRST FOOD, DRINK OR MEDS. STAY UPRIGHT FOR 60  MINS Yes Dirk Montana MD   escitalopram (LEXAPRO) 10 MG tablet TAKE 1 TABLET BY MOUTH DAILY Yes Dirk Montana MD   ketoconazole (NIZORAL) 2 % shampoo Apply shampoo three times weekly (M, W, F) leave in 3-5 min, then rinse. May alternate with other shampoos Yes Rosy White MD   atorvastatin (LIPITOR) 10 MG tablet TAKE 1 TABLET DAILY Yes Dirk Montana MD   fluticasone (FLONASE) 50 MCG/ACT nasal spray SPRAY 1 SPRAY INTO EACH NOSTRIL EVERY DAY Yes Dirk Montana MD   [facility] Natural Products (OSTEO BI-FLEX JOINT SHIELD PO) Take by mouth + VITAMIN D Yes ProviderAlber MD   timolol (BETIMOL) 0.5 % ophthalmic solution 1 drop 2 times daily Yes Provider, MD Alber   tolterodine (DETROL LA) 4 MG extended release capsule TAKE ONE (1) CAPSULE BY MOUTH DAILY  Dirk Montana MD   memantine (NAMENDA) 10 MG tablet Take 1 tablet by mouth 2 times daily  Patient not taking: Reported on 12/9/2024  Dirk Montana MD       [facility] (Including outside providers/suppliers regularly involved in providing care):   Patient Care Team:  Dirk Montana MD as PCP - General (Family Medicine)  Dirk Montana MD [facility] P[facility] - [facility]      Reviewed and updated this visit:  Tobacco  Allergies  Meds  Med Hx  Surg Hx  Soc Hx  Fam Hx      I, Mateo Foster RN, 12/9/2024, performed the documented evaluation under the direct supervision of the attending physician.  Laura Alvarez, was evaluated through a synchronous (real-time) audio-video encounter. The patient (or guardian if applicable) is aware that this is a billable service, which includes applicable co-pays. This Virtual Visit was conducted with patient's (and/or legal guardian's) consent. Patient identification was verified,

## 2025-03-26 DIAGNOSIS — F32.9 REACTIVE DEPRESSION: ICD-10-CM

## 2025-03-26 RX ORDER — ESCITALOPRAM OXALATE 10 MG/1
10 TABLET ORAL DAILY
Qty: 90 TABLET | Refills: 0 | Status: SHIPPED | OUTPATIENT
Start: 2025-03-26

## 2025-03-26 NOTE — TELEPHONE ENCOUNTER
Refill Request     CONFIRM preferrred pharmacy with the patient.    If Mail Order Rx - Pend for 90 day refill.      Last Seen: Last Seen Department: 12/9/2024  Last Seen by PCP: Visit date not found    Last Written: 5/6/24    If no future appointment scheduled, route STAFF MESSAGE with patient name to the  Pool for scheduling.      Next Appointment:   Future Appointments   Date Time Provider Department Center   4/22/2025  9:10 AM [facility]       Message sent to  to schedule appt with patient?  N/A      Requested Prescriptions     Pending Prescriptions Disp Refills    escitalopram (LEXAPRO) 10 MG tablet 90 tablet 0     Sig: Take 1 tablet by mouth daily

## 2025-04-21 DIAGNOSIS — M85.80 OSTEOPENIA: ICD-10-CM

## 2025-04-21 RX ORDER — IBANDRONATE SODIUM 150 MG/1
150 TABLET, FILM COATED ORAL
Qty: 3 TABLET | Refills: 3 | Status: SHIPPED | OUTPATIENT
Start: 2025-04-21

## 2025-04-21 NOTE — TELEPHONE ENCOUNTER
Refill Request     CONFIRM preferrred pharmacy with the patient.    If Mail Order Rx - Pend for 90 day refill.      Last Seen: Last Seen Department: 12/9/2024  Last Seen by PCP: Visit date not found    Last Written: 6/11/24    If no future appointment scheduled, route STAFF MESSAGE with patient name to the  Pool for scheduling.      Next Appointment:   Future Appointments   Date Time Provider Department Center   4/22/2025  9:10 AM [facility]       Message sent to  to schedule appt with patient?  N/A      Requested Prescriptions     Pending Prescriptions Disp Refills    ibandronate (BONIVA) 150 MG tablet 3 tablet 3     Sig: Take 1 tablet by mouth every 30 days

## 2025-04-22 ENCOUNTER — OFFICE VISIT (OUTPATIENT)
Dept: FAMILY MEDICINE CLINIC | Age: 85
End: 2025-04-22

## 2025-04-22 VITALS
WEIGHT: 156 LBS | RESPIRATION RATE: 17 BRPM | OXYGEN SATURATION: 97 % | BODY MASS INDEX: 26.78 KG/M2 | SYSTOLIC BLOOD PRESSURE: 177 MMHG | HEART RATE: 64 BPM | DIASTOLIC BLOOD PRESSURE: 76 MMHG

## 2025-04-22 DIAGNOSIS — M85.80 OSTEOPENIA, UNSPECIFIED LOCATION: ICD-10-CM

## 2025-04-22 DIAGNOSIS — F03.90 DEMENTIA WITHOUT BEHAVIORAL DISTURBANCE (HCC): ICD-10-CM

## 2025-04-22 DIAGNOSIS — Z13.1 SCREENING FOR DIABETES MELLITUS: ICD-10-CM

## 2025-04-22 DIAGNOSIS — E05.00 GRAVES DISEASE: Primary | ICD-10-CM

## 2025-04-22 DIAGNOSIS — N32.81 OAB (OVERACTIVE BLADDER): ICD-10-CM

## 2025-04-22 DIAGNOSIS — J34.89 NASAL DRAINAGE: ICD-10-CM

## 2025-04-22 DIAGNOSIS — E78.2 MIXED HYPERLIPIDEMIA: ICD-10-CM

## 2025-04-22 LAB
ALBUMIN SERPL-MCNC: 4 G/DL (ref 3.4–5)
ALBUMIN/GLOB SERPL: 1.8 {RATIO} (ref 1.1–2.2)
ALP SERPL-CCNC: 113 U/L (ref 40–129)
ALT SERPL-CCNC: 10 U/L (ref 10–40)
ANION GAP SERPL CALCULATED.3IONS-SCNC: 8 MMOL/L (ref 3–16)
AST SERPL-CCNC: 21 U/L (ref 15–37)
BASOPHILS # BLD: 0 K/UL (ref 0–0.2)
BASOPHILS NFR BLD: 1 %
BILIRUB SERPL-MCNC: 0.5 MG/DL (ref 0–1)
BUN SERPL-MCNC: 21 MG/DL (ref 7–20)
CALCIUM SERPL-MCNC: 9.1 MG/DL (ref 8.3–10.6)
CHLORIDE SERPL-SCNC: 106 MMOL/L (ref 99–110)
CHOLEST SERPL-MCNC: 210 MG/DL (ref 0–199)
CO2 SERPL-SCNC: 26 MMOL/L (ref 21–32)
CREAT SERPL-MCNC: 1.1 MG/DL (ref 0.6–1.2)
DEPRECATED RDW RBC AUTO: 14 % (ref 12.4–15.4)
EOSINOPHIL # BLD: 0.1 K/UL (ref 0–0.6)
EOSINOPHIL NFR BLD: 1.2 %
GFR SERPLBLD CREATININE-BSD FMLA CKD-EPI: 49 ML/MIN/{1.73_M2}
GLUCOSE SERPL-MCNC: 102 MG/DL (ref 70–99)
HCT VFR BLD AUTO: 39.9 % (ref 36–48)
HDLC SERPL-MCNC: 75 MG/DL (ref 40–60)
HGB BLD-MCNC: 13.2 G/DL (ref 12–16)
LDLC SERPL CALC-MCNC: 112 MG/DL
LYMPHOCYTES # BLD: 1 K/UL (ref 1–5.1)
LYMPHOCYTES NFR BLD: 19.7 %
MAGNESIUM SERPL-MCNC: 2.23 MG/DL (ref 1.8–2.4)
MCH RBC QN AUTO: 30 PG (ref 26–34)
MCHC RBC AUTO-ENTMCNC: 33 G/DL (ref 31–36)
MCV RBC AUTO: 90.7 FL (ref 80–100)
MONOCYTES # BLD: 0.5 K/UL (ref 0–1.3)
MONOCYTES NFR BLD: 10 %
NEUTROPHILS # BLD: 3.4 K/UL (ref 1.7–7.7)
NEUTROPHILS NFR BLD: 68.1 %
PLATELET # BLD AUTO: 181 K/UL (ref 135–450)
PMV BLD AUTO: 9.7 FL (ref 5–10.5)
POTASSIUM SERPL-SCNC: 4.4 MMOL/L (ref 3.5–5.1)
PROT SERPL-MCNC: 6.2 G/DL (ref 6.4–8.2)
RBC # BLD AUTO: 4.39 M/UL (ref 4–5.2)
SODIUM SERPL-SCNC: 140 MMOL/L (ref 136–145)
T4 FREE SERPL-MCNC: 1.7 NG/DL (ref 0.9–1.8)
TRIGL SERPL-MCNC: 113 MG/DL (ref 0–150)
TSH SERPL DL<=0.005 MIU/L-ACNC: 0.88 UIU/ML (ref 0.27–4.2)
VLDLC SERPL CALC-MCNC: 23 MG/DL
WBC # BLD AUTO: 5 K/UL (ref 4–11)

## 2025-04-22 RX ORDER — ATORVASTATIN CALCIUM 10 MG/1
TABLET, FILM COATED ORAL
Qty: 90 TABLET | Refills: 2 | Status: SHIPPED | OUTPATIENT
Start: 2025-04-22

## 2025-04-22 RX ORDER — TOLTERODINE 4 MG/1
4 CAPSULE, EXTENDED RELEASE ORAL DAILY
Qty: 90 CAPSULE | Refills: 2 | Status: SHIPPED | OUTPATIENT
Start: 2025-04-22

## 2025-04-22 RX ORDER — LEVOTHYROXINE SODIUM 75 UG/1
TABLET ORAL
Qty: 90 TABLET | Refills: 2 | Status: SHIPPED | OUTPATIENT
Start: 2025-04-22

## 2025-04-22 RX ORDER — FLUTICASONE PROPIONATE 50 MCG
SPRAY, SUSPENSION (ML) NASAL
Qty: 12 G | Refills: 3 | Status: SHIPPED | OUTPATIENT
Start: 2025-04-22

## 2025-04-22 RX ORDER — MEMANTINE HYDROCHLORIDE 10 MG/1
10 TABLET ORAL 2 TIMES DAILY
Qty: 180 TABLET | Refills: 2 | Status: SHIPPED | OUTPATIENT
Start: 2025-04-22

## 2025-04-22 SDOH — ECONOMIC STABILITY: FOOD INSECURITY: WITHIN THE PAST 12 MONTHS, YOU WORRIED THAT YOUR FOOD WOULD RUN OUT BEFORE YOU GOT MONEY TO BUY MORE.: NEVER TRUE

## 2025-04-22 SDOH — ECONOMIC STABILITY: FOOD INSECURITY: WITHIN THE PAST 12 MONTHS, THE FOOD YOU BOUGHT JUST DIDN'T LAST AND YOU DIDN'T HAVE MONEY TO GET MORE.: NEVER TRUE

## 2025-04-22 NOTE — PROGRESS NOTES
Laura Alvarez (:  1940) is a 84 y.o. female, here for evaluation of the following chief complaint(s):  New Patient, Graves' Disease, Cholesterol Problem, Memory Loss, and balance (Pt is stating she has to hold onto things to walk )         Assessment & Plan  1. Dementia.  Chronic, stable.  Short-term memory impairments over the last 1 to 2 years. She is currently taking memantine for memory loss. A referral to a neurology specialist will be initiated for further evaluation and management of her memory changes.    2. Overactive bladder.  Chronic, stable she is currently on tolterodine 4 mg for overactive bladder, which is the maximum dose.    3. Osteopenia.  Chronic, stable.  She is on ibandronate for osteopenia.    4. Graves' disease.  Chronic, stable.  She is taking levothyroxine for Graves' disease. If her thyroid labs remain stable, no changes will be made to her medication regimen. If her thyroid levels are abnormal, adjustments to her medication intake timing may be recommended.    5. Hyperlipidemia.  Chronic, stable.  She is taking Lipitor 10 mg daily for hyperlipidemia.    6. Health maintenance.  Her blood pressure readings have consistently been within the normal range, typically around 130s/80s. Blood work will be ordered today to monitor her overall health status.    Follow-up  The patient will follow up in 6 months.    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 Graves disease  -     Comprehensive Metabolic Panel; Future  -     T4, Free; Future  -     TSH reflex to FT4  2. OAB (overactive bladder)  -     tolterodine (DETROL LA) 4 MG extended release capsule; Take 1 capsule by mouth daily, Disp-90 capsule, R-2Normal  3. Dementia without behavioral disturbance (HCC)  -     memantine (NAMENDA) 10 MG tablet; Take 1 tablet by mouth 2 times daily, Disp-180 tablet, R-2Normal  -     [facility] - Taj Romero MD, Neurology,

## 2025-04-23 LAB
EST. AVERAGE GLUCOSE BLD GHB EST-MCNC: 102.5 MG/DL
HBA1C MFR BLD: 5.2 %

## 2025-04-25 ENCOUNTER — RESULTS FOLLOW-UP (OUTPATIENT)
Dept: FAMILY MEDICINE CLINIC | Age: 85
End: 2025-04-25

## 2025-05-14 ENCOUNTER — PATIENT MESSAGE (OUTPATIENT)
Dept: FAMILY MEDICINE CLINIC | Age: 85
End: 2025-05-14

## 2025-05-14 DIAGNOSIS — I10 HYPERTENSION, UNSPECIFIED TYPE: Primary | ICD-10-CM

## 2025-05-23 RX ORDER — AMLODIPINE BESYLATE 2.5 MG/1
2.5 TABLET ORAL DAILY
Qty: 90 TABLET | Refills: 1 | Status: SHIPPED | OUTPATIENT
Start: 2025-05-23

## 2025-05-27 DIAGNOSIS — F32.9 REACTIVE DEPRESSION: ICD-10-CM

## 2025-05-28 RX ORDER — AMLODIPINE BESYLATE 2.5 MG/1
2.5 TABLET ORAL DAILY
Qty: 90 TABLET | Refills: 0 | Status: SHIPPED | OUTPATIENT
Start: 2025-05-28

## 2025-05-28 RX ORDER — ESCITALOPRAM OXALATE 10 MG/1
10 TABLET ORAL DAILY
Qty: 90 TABLET | Refills: 3 | Status: SHIPPED | OUTPATIENT
Start: 2025-05-28

## 2025-08-16 DIAGNOSIS — I10 HYPERTENSION, UNSPECIFIED TYPE: ICD-10-CM

## 2025-08-18 RX ORDER — AMLODIPINE BESYLATE 5 MG/1
5 TABLET ORAL DAILY
Qty: 90 TABLET | Refills: 0 | Status: SHIPPED | OUTPATIENT
Start: 2025-08-18